# Patient Record
Sex: MALE | Race: WHITE | NOT HISPANIC OR LATINO | Employment: OTHER | URBAN - METROPOLITAN AREA
[De-identification: names, ages, dates, MRNs, and addresses within clinical notes are randomized per-mention and may not be internally consistent; named-entity substitution may affect disease eponyms.]

---

## 2017-01-23 ENCOUNTER — OFFICE VISIT (OUTPATIENT)
Dept: UROLOGY | Facility: CLINIC | Age: 72
End: 2017-01-23
Payer: MEDICARE

## 2017-01-23 ENCOUNTER — TELEPHONE (OUTPATIENT)
Dept: UROLOGY | Facility: CLINIC | Age: 72
End: 2017-01-23

## 2017-01-23 ENCOUNTER — LAB VISIT (OUTPATIENT)
Dept: LAB | Facility: HOSPITAL | Age: 72
End: 2017-01-23
Attending: UROLOGY
Payer: MEDICARE

## 2017-01-23 VITALS
HEIGHT: 70 IN | BODY MASS INDEX: 33.36 KG/M2 | HEART RATE: 82 BPM | WEIGHT: 233 LBS | SYSTOLIC BLOOD PRESSURE: 142 MMHG | DIASTOLIC BLOOD PRESSURE: 86 MMHG

## 2017-01-23 DIAGNOSIS — R31.9 HEMATURIA, UNDIAGNOSED CAUSE: ICD-10-CM

## 2017-01-23 LAB
CREAT SERPL-MCNC: 1.4 MG/DL
EST. GFR  (AFRICAN AMERICAN): 59 ML/MIN/1.73 M^2
EST. GFR  (NON AFRICAN AMERICAN): 51 ML/MIN/1.73 M^2

## 2017-01-23 PROCEDURE — 99214 OFFICE O/P EST MOD 30 MIN: CPT | Mod: S$PBB,,, | Performed by: UROLOGY

## 2017-01-23 PROCEDURE — 36415 COLL VENOUS BLD VENIPUNCTURE: CPT

## 2017-01-23 PROCEDURE — 99999 PR PBB SHADOW E&M-EST. PATIENT-LVL III: CPT | Mod: PBBFAC,,, | Performed by: UROLOGY

## 2017-01-23 PROCEDURE — 81002 URINALYSIS NONAUTO W/O SCOPE: CPT | Mod: PBBFAC,PO | Performed by: UROLOGY

## 2017-01-23 PROCEDURE — 88112 CYTOPATH CELL ENHANCE TECH: CPT | Mod: 26,,, | Performed by: PATHOLOGY

## 2017-01-23 PROCEDURE — 88112 CYTOPATH CELL ENHANCE TECH: CPT | Performed by: PATHOLOGY

## 2017-01-23 PROCEDURE — 82565 ASSAY OF CREATININE: CPT

## 2017-01-23 PROCEDURE — 99213 OFFICE O/P EST LOW 20 MIN: CPT | Mod: PBBFAC,PO | Performed by: UROLOGY

## 2017-01-23 RX ORDER — ZOLPIDEM TARTRATE 10 MG/1
10 TABLET ORAL NIGHTLY PRN
COMMUNITY
End: 2023-11-06 | Stop reason: CLARIF

## 2017-01-23 RX ORDER — LEVOTHYROXINE SODIUM 25 UG/1
25 TABLET ORAL
COMMUNITY
End: 2017-04-12 | Stop reason: DRUGHIGH

## 2017-01-23 RX ORDER — ASPIRIN 81 MG/1
81 TABLET ORAL DAILY
COMMUNITY
End: 2017-12-04

## 2017-01-23 NOTE — TELEPHONE ENCOUNTER
----- Message from Daphne Zamudio sent at 1/23/2017  8:24 AM CST -----  Contact: self [phone]   Patient is requesting a call back from the nurse stated he passing blood and requesting an appt with dr joya refuse to see an assistant.  Please call the patient upon request at phone number [phone].

## 2017-01-23 NOTE — TELEPHONE ENCOUNTER
Passing a lot of blood in his urine for the past 3 days.  Would like to see Dr. Arevalo only.   appt booked.

## 2017-01-23 NOTE — MR AVS SNAPSHOT
[facility]  [address]  [place] [address]  Phone: [phone]                  Mauri Archer Jr.   2017 2:45 PM   Office Visit    Description:  Male : 1945   Provider:  Dave Arevalo MD   Department:  [facility]           Reason for Visit     Gross hematuria     Dysuria           Diagnoses this Visit        Comments    Hematuria, undiagnosed cause                To Do List           Future Appointments        Provider Department Dept Phone    2017 4:30 PM [facility] CT1 LIMIT [facility] [phone]    2/10/2017 10:45 AM [facility] [phone]      Goals (5 Years of Data)     None      [facility]     [facility] -  Assistance  Registered nurses in the [facility] provide clinical advisement, health education, appointment booking, and other advisory services.  Call for this free service at [phone].             Medications           Message regarding Medications     Verify the changes and/or additions to your medication regime listed below are the same as discussed with your clinician today.  If any of these changes or additions are incorrect, please notify your healthcare provider.             Verify that the below list of medications is an accurate representation of the medications you are currently taking.  If none reported, the list may be blank. If incorrect, please contact your healthcare provider. Carry this list with you in case of emergency.           Current Medications     allopurinol (ZYLOPRIM) 300 MG tablet Take 300 mg by mouth once daily.    aspirin (ECOTRIN) 81 MG EC tablet Take 81 mg by mouth once daily.    calcium citrate-vitamin D3 315-200 mg (CITRACAL+D) 315-200 mg-unit per tablet Take 1 tablet by mouth once daily. 2 nightly    CALCIUM ORAL Take 1 tablet by mouth once daily.    ciclesonide (OMNARIS) 50 mcg Spry 2 sprays  "by Each Nare route daily as needed.    empagliflozin (JARDIANCE) 10 mg Tab Take 1 tablet by mouth once daily.    FREESTYLE LITE STRIPS Strp     glimepiride (AMARYL) 4 MG tablet Take 4 mg by mouth daily with breakfast.     insulin detemir (LEVEMIR) 100 unit/mL injection Inject 32 Units into the skin every evening.     levmefolate-B6 phos-methyl-B12 (METANX) 3-35-2 mg Tab Take 1 tablet by mouth once daily.    levothyroxine (SYNTHROID) 200 MCG tablet Take 200 mcg by mouth once daily.    levothyroxine (SYNTHROID) 25 MCG tablet Take 25 mcg by mouth 3 (three) times a week.    omega-3 acid ethyl esters (LOVAZA) 1 gram capsule Take 2 g by mouth 2 (two) times daily. 1 am and 2 at night    pravastatin (PRAVACHOL) 40 MG tablet Take 40 mg by mouth once daily.    ramipril (ALTACE) 5 MG capsule Take 5 mg by mouth once daily.    sildenafil (VIAGRA) 100 MG tablet Take 1 tablet (100 mg total) by mouth as needed for Erectile Dysfunction.    sitagliptan-metformin (JANUMET) 50-1,000 mg per tablet Take 1 tablet by mouth 2 (two) times daily with meals.    tamsulosin (FLOMAX) 0.4 mg Cp24 Take 1 capsule (0.4 mg total) by mouth once daily.    zolpidem (AMBIEN) 10 mg Tab Take 5 mg by mouth nightly as needed.           Clinical Reference Information           Vital Signs - Last Recorded  Most recent update: 1/23/2017  3:28 PM by Halle Mustafa MA    BP Pulse Ht Wt BMI    (!) 142/86 (BP Location: Right arm, Patient Position: Sitting, BP Method: Automatic) 82 5' 10" (1.778 m) 105.7 kg (233 lb) 33.43 kg/m2      Blood Pressure          Most Recent Value    BP  (!)  142/86      Allergies as of 1/23/2017     No Known Allergies      Immunizations Administered on Date of Encounter - 1/23/2017     None      Orders Placed During Today's Visit      Normal Orders This Visit    Cytology, urine     POCT URINE DIPSTICK WITHOUT MICROSCOPE     Future Labs/Procedures Expected by Expires    Creatinine, serum  1/23/2017 1/24/2018    CT Abdomen Pelvis W Wo " Contrast  1/23/2017 1/24/2018    Cytology, urine  1/23/2017 1/24/2018

## 2017-01-23 NOTE — PROGRESS NOTES
Subjective:       Patient ID: Mauri Archer Jr. is a 71 y.o. male.    Chief Complaint:   OFFICE NOTE    CHIEF COMPLAINT:  Recurrent gross hematuria.    Mr. Archer is a 71-year-old male who referred that for the last couple of days,   he has been experiencing gross hematuria with clots.  He refers that a year ago,   have another episode of hematuria, but resolved within 24 hours.  The patient   denies dysuria.  The patient denies any decrease in the urine flow, but he   refers that the end, have significant terminal dribbling.  Nocturia x1.  Denies   any pain, flank pain, fever.  Mr. Archer was seen by me last time on June 2016.    At that time, he was having difficulty with erectile dysfunction and BPH   symptoms.    The last PSA was on June 2016, 2.8.  The urinalysis today shows negative   leukocytes or nitrites, 4+ glucose.  The patient is diabetic, known, under   treatment and 2+ blood.    The remaining of the medical and surgical history and the numerous current   medications are well documented in the medical record and this was reviewed   during this visit.  The only medication that he takes as a blood thinner is   aspirin.      EOR/PN  dd: 01/23/2017 17:58:33 (CST)  td: 01/23/2017 20:37:45 (CST)  Doc ID   #[id number]  Job ID #[id number]    CC:       HPI  Review of Systems   Constitutional: Negative for activity change and appetite change.   HENT: Negative.    Eyes: Negative for discharge.   Respiratory: Negative for cough and shortness of breath.    Cardiovascular: Negative for chest pain and palpitations.   Gastrointestinal: Negative for abdominal distention, abdominal pain, constipation and vomiting.   Genitourinary: Positive for hematuria. Negative for discharge, dysuria, flank pain, frequency, testicular pain and urgency.   Musculoskeletal: Negative for arthralgias.   Skin: Negative for rash.   Neurological: Negative for dizziness.   Psychiatric/Behavioral: The patient is not nervous/anxious.         Objective:      Physical Exam   Constitutional: He appears well-developed and well-nourished.   HENT:   Head: Normocephalic.   Eyes: Pupils are equal, round, and reactive to light.   Neck: Normal range of motion.   Cardiovascular: Normal rate.    Pulmonary/Chest: Effort normal.   Abdominal: Soft. He exhibits no distension and no mass. There is no tenderness. Hernia confirmed negative in the right inguinal area and confirmed negative in the left inguinal area.   Genitourinary: Rectum normal and penis normal. Rectal exam shows no external hemorrhoid, no mass and no tenderness. Prostate is enlarged. Prostate is not tender. Right testis shows no mass and no tenderness. Left testis shows no mass and no tenderness. No discharge found.   Musculoskeletal: Normal range of motion.   Neurological: He is alert.   Skin: Skin is warm.     Psychiatric: He has a normal mood and affect.       Assessment:       1. Hematuria, undiagnosed cause        Plan:       Hematuria, undiagnosed cause  -     POCT URINE DIPSTICK WITHOUT MICROSCOPE  -     Creatinine, serum; Future; Expected date: 1/23/17  -     CT Abdomen Pelvis W Wo Contrast; Future; Expected date: 1/23/17  -     Cytology, urine; Future; Expected date: 1/23/17    May need cystoscopy.  Plan on UFS/US due to his large prostate at rectal examination.

## 2017-01-24 LAB
BILIRUB SERPL-MCNC: ABNORMAL MG/DL
BLOOD URINE, POC: ABNORMAL
COLOR, POC UA: ABNORMAL
GLUCOSE UR QL STRIP: ABNORMAL
KETONES UR QL STRIP: ABNORMAL
LEUKOCYTE ESTERASE URINE, POC: ABNORMAL
NITRITE, POC UA: ABNORMAL
PH, POC UA: 5
PROTEIN, POC: ABNORMAL
SPECIFIC GRAVITY, POC UA: 1.02
UROBILINOGEN, POC UA: ABNORMAL

## 2017-01-25 ENCOUNTER — HOSPITAL ENCOUNTER (OUTPATIENT)
Dept: RADIOLOGY | Facility: HOSPITAL | Age: 72
Discharge: HOME | End: 2017-01-25
Attending: UROLOGY
Payer: MEDICARE

## 2017-01-25 DIAGNOSIS — R31.9 HEMATURIA, UNDIAGNOSED CAUSE: ICD-10-CM

## 2017-01-25 PROCEDURE — 25500020 PHARM REV CODE 255

## 2017-01-25 PROCEDURE — 74178 CT ABD&PLV WO CNTR FLWD CNTR: CPT | Mod: TC

## 2017-01-25 PROCEDURE — 74178 CT ABD&PLV WO CNTR FLWD CNTR: CPT | Mod: 26,,, | Performed by: RADIOLOGY

## 2017-01-25 RX ADMIN — IOHEXOL 100 ML: 350 INJECTION, SOLUTION INTRAVENOUS at 02:01

## 2017-01-27 ENCOUNTER — PROCEDURE VISIT (OUTPATIENT)
Dept: UROLOGY | Facility: CLINIC | Age: 72
End: 2017-01-27
Payer: MEDICARE

## 2017-01-27 VITALS
HEART RATE: 65 BPM | WEIGHT: 235 LBS | HEIGHT: 70 IN | SYSTOLIC BLOOD PRESSURE: 144 MMHG | DIASTOLIC BLOOD PRESSURE: 84 MMHG | BODY MASS INDEX: 33.64 KG/M2

## 2017-01-27 DIAGNOSIS — N40.1 BPH WITH OBSTRUCTION/LOWER URINARY TRACT SYMPTOMS: Primary | ICD-10-CM

## 2017-01-27 DIAGNOSIS — N13.8 BPH WITH OBSTRUCTION/LOWER URINARY TRACT SYMPTOMS: Primary | ICD-10-CM

## 2017-01-27 PROCEDURE — 51741 ELECTRO-UROFLOWMETRY FIRST: CPT | Mod: 26,S$PBB,, | Performed by: UROLOGY

## 2017-01-27 PROCEDURE — 51741 ELECTRO-UROFLOWMETRY FIRST: CPT | Mod: PBBFAC,PO | Performed by: UROLOGY

## 2017-01-27 PROCEDURE — 76872 US TRANSRECTAL: CPT | Mod: PBBFAC,PO | Performed by: UROLOGY

## 2017-01-27 NOTE — PROCEDURES
"TRUS  Date/Time: 2017 12:13 PM  Performed by: ARELY ROBIN  Authorized by: ARELY ROBIN     Consent Done?:  Yes (Verbal)  Time out: Immediately prior to procedure a "time out" was called to verify the correct patient, procedure, equipment, support staff and site/side marked as required.    Indications comment:  BPH with LUTS and hematuria  Position:  Left lateral  Patient sedated: No    Prostate Size:  H: 50.9  W:58.7  L: 62.7  Vol: 98.1  PSA 2.8 PSAD: 0.03  Lesions:: No      Patient tolerance:  Patient tolerated the procedure well with no immediate complications      URINE FLOW STUDY REPORT    NAME:Mauri Blair Gianni Morales  :1945   DIAGNOSIS:BPH with hematuria  Current PSA:2.8         URINE FLOW STUDY:  Volume:  379 ml             Average Flow:  8  ml/sec  Peak Flow: 15 ml/sec      Curve Shape: Obstructed  IMPRESSION: Restricted urine flow study                      Large BPH. Atypical urine cytology.     Plan: Cystoscopi 17 may need a PVP.      "

## 2017-01-27 NOTE — MR AVS SNAPSHOT
[facility]   [address]  [place] [address]  Phone: [phone]                  Mauri Archer Jr.   2017 11:00 AM   Procedure visit    Description:  Male : 1945   Provider:  Dave Arevalo MD   Department:  [facility]           Reason for Visit     Trus with Ufs                To Do List           Goals (5 Years of Data)     None      [facility]     [facility] On Call Nurse Care Line -  Assistance  Registered nurses in the [facility] On Call Center provide clinical advisement, health education, appointment booking, and other advisory services.  Call for this free service at [phone].             Medications           Message regarding Medications     Verify the changes and/or additions to your medication regime listed below are the same as discussed with your clinician today.  If any of these changes or additions are incorrect, please notify your healthcare provider.             Verify that the below list of medications is an accurate representation of the medications you are currently taking.  If none reported, the list may be blank. If incorrect, please contact your healthcare provider. Carry this list with you in case of emergency.           Current Medications     allopurinol (ZYLOPRIM) 300 MG tablet Take 300 mg by mouth once daily.    aspirin (ECOTRIN) 81 MG EC tablet Take 81 mg by mouth once daily.    calcium citrate-vitamin D3 315-200 mg (CITRACAL+D) 315-200 mg-unit per tablet Take 1 tablet by mouth once daily. 2 nightly    CALCIUM ORAL Take 1 tablet by mouth once daily.    ciclesonide (OMNARIS) 50 mcg Spry 2 sprays by Each Nare route daily as needed.    empagliflozin (JARDIANCE) 10 mg Tab Take 1 tablet by mouth once daily.    FREESTYLE LITE STRIPS Strp     glimepiride (AMARYL) 4 MG tablet Take 4 mg by mouth daily with breakfast.     insulin detemir (LEVEMIR) 100 unit/mL injection Inject 32 Units into the skin every evening.      "levmefolate-B6 phos-methyl-B12 (METANX) 3-35-2 mg Tab Take 1 tablet by mouth once daily.    levothyroxine (SYNTHROID) 200 MCG tablet Take 200 mcg by mouth once daily.    levothyroxine (SYNTHROID) 25 MCG tablet Take 25 mcg by mouth 3 (three) times a week.    omega-3 acid ethyl esters (LOVAZA) 1 gram capsule Take 2 g by mouth 2 (two) times daily. 1 am and 2 at night    pravastatin (PRAVACHOL) 40 MG tablet Take 40 mg by mouth once daily.    ramipril (ALTACE) 5 MG capsule Take 5 mg by mouth once daily.    sildenafil (VIAGRA) 100 MG tablet Take 1 tablet (100 mg total) by mouth as needed for Erectile Dysfunction.    tamsulosin (FLOMAX) 0.4 mg Cp24 Take 1 capsule (0.4 mg total) by mouth once daily.    zolpidem (AMBIEN) 10 mg Tab Take 5 mg by mouth nightly as needed.    sitagliptan-metformin (JANUMET) 50-1,000 mg per tablet Take 1 tablet by mouth 2 (two) times daily with meals.           Clinical Reference Information           Vital Signs - Last Recorded  Most recent update: 1/27/2017 11:25 AM by Halle Mustafa MA    BP Pulse Ht Wt BMI    (!) 144/84 (BP Location: Left arm, Patient Position: Sitting, BP Method: Automatic) 65 5' 10" (1.778 m) 106.6 kg (235 lb) 33.72 kg/m2      Blood Pressure          Most Recent Value    BP  (!)  144/84      Allergies as of 1/27/2017     No Known Allergies      Immunizations Administered on Date of Encounter - 1/27/2017     None      "

## 2017-01-31 ENCOUNTER — TELEPHONE (OUTPATIENT)
Dept: UROLOGY | Facility: CLINIC | Age: 72
End: 2017-01-31

## 2017-01-31 NOTE — TELEPHONE ENCOUNTER
----- Message from Kimmy Mookie sent at 1/31/2017  2:49 PM CST -----  Resend the fax to Dr. Parsons's office, fax was not readable. Please fax to [phone], any questions call Sirena at [phone].

## 2017-02-08 ENCOUNTER — HOSPITAL ENCOUNTER (OUTPATIENT)
Facility: AMBULARY SURGERY CENTER | Age: 72
Discharge: HOME | End: 2017-02-08
Attending: UROLOGY | Admitting: UROLOGY
Payer: MEDICARE

## 2017-02-08 ENCOUNTER — TELEPHONE (OUTPATIENT)
Dept: UROLOGY | Facility: CLINIC | Age: 72
End: 2017-02-08

## 2017-02-08 DIAGNOSIS — N40.1 BPH WITH OBSTRUCTION/LOWER URINARY TRACT SYMPTOMS: ICD-10-CM

## 2017-02-08 DIAGNOSIS — N13.8 BPH WITH OBSTRUCTION/LOWER URINARY TRACT SYMPTOMS: ICD-10-CM

## 2017-02-08 LAB
BILIRUB SERPL-MCNC: NORMAL MG/DL
BLOOD URINE, POC: NORMAL
COLOR, POC UA: NORMAL
GLUCOSE UR QL STRIP: 1000
KETONES UR QL STRIP: NORMAL
LEUKOCYTE ESTERASE URINE, POC: NORMAL
NITRITE, POC UA: NORMAL
PH, POC UA: 5
PROTEIN, POC: NORMAL
SPECIFIC GRAVITY, POC UA: 1.02
UROBILINOGEN, POC UA: NORMAL

## 2017-02-08 PROCEDURE — 52000 CYSTOURETHROSCOPY: CPT | Performed by: UROLOGY

## 2017-02-08 PROCEDURE — 52000 CYSTOURETHROSCOPY: CPT | Mod: ,,, | Performed by: UROLOGY

## 2017-02-08 PROCEDURE — G8918 PT W/O PREOP ORDER IV AB PRO: HCPCS | Performed by: UROLOGY

## 2017-02-08 PROCEDURE — G8907 PT DOC NO EVENTS ON DISCHARG: HCPCS | Performed by: UROLOGY

## 2017-02-08 RX ORDER — WATER 1000 ML/1000ML
INJECTION, SOLUTION INTRAVENOUS
Status: DISCONTINUED | OUTPATIENT
Start: 2017-02-08 | End: 2017-02-08 | Stop reason: HOSPADM

## 2017-02-08 RX ORDER — LIDOCAINE HYDROCHLORIDE 20 MG/ML
JELLY TOPICAL
Status: DISCONTINUED | OUTPATIENT
Start: 2017-02-08 | End: 2017-02-08 | Stop reason: HOSPADM

## 2017-02-08 RX ORDER — LIDOCAINE HYDROCHLORIDE 20 MG/ML
JELLY TOPICAL
Status: DISCONTINUED
Start: 2017-02-08 | End: 2017-02-08 | Stop reason: HOSPADM

## 2017-02-08 NOTE — H&P
CHIEF COMPLAINT: Recurrent gross hematuria.     Mr. Archer is a 71-year-old male who referred that for the last couple of days,   he has been experiencing gross hematuria with clots. He refers that a year ago,  have another episode of hematuria, but resolved within 24 hours. The patient   denies dysuria. The patient denies any decrease in the urine flow, but he   refers that the end, have significant terminal dribbling. Nocturia x1. Denies   any pain, flank pain, fever. Mr. Archer was seen by me last time on June 2016.   At that time, he was having difficulty with erectile dysfunction and BPH   symptoms.     The last PSA was on June 2016, 2.8. The urinalysis today shows negative   leukocytes or nitrites, 4+ glucose. The patient is diabetic, known, under   treatment and 2+ blood.     The remaining of the medical and surgical history and the numerous current   medications are well documented in the medical record and this was reviewed   during this visit. The only medication that he takes as a blood thinner is   aspirin.          Review of Systems   Constitutional: Negative for activity change and appetite change.   HENT: Negative.   Eyes: Negative for discharge.   Respiratory: Negative for cough and shortness of breath.   Cardiovascular: Negative for chest pain and palpitations.   Gastrointestinal: Negative for abdominal distention, abdominal pain, constipation and vomiting.   Genitourinary: Positive for hematuria. Negative for discharge, dysuria, flank pain, frequency, testicular pain and urgency.   Musculoskeletal: Negative for arthralgias.   Skin: Negative for rash.   Neurological: Negative for dizziness.   Psychiatric/Behavioral: The patient is not nervous/anxious.       Objective:      Physical Exam   Constitutional: He appears well-developed and well-nourished.   HENT:   Head: Normocephalic.   Eyes: Pupils are equal, round, and reactive to light.   Neck: Normal range of motion.   Cardiovascular: Normal rate.  "  Pulmonary/Chest: Effort normal.   Abdominal: Soft. He exhibits no distension and no mass. There is no tenderness. Hernia confirmed negative in the right inguinal area and confirmed negative in the left inguinal area.   Genitourinary: Rectum normal and penis normal. Rectal exam shows no external hemorrhoid, no mass and no tenderness. Prostate is enlarged. Prostate is not tender. Right testis shows no mass and no tenderness. Left testis shows no mass and no tenderness. No discharge found.   Musculoskeletal: Normal range of motion.   Neurological: He is alert.   Skin: Skin is warm.     Psychiatric: He has a normal mood and affect.         TRUS  Date/Time: 2017 12:13 PM  Performed by: ARELY ROBIN.  Authorized by: ARELY ROBIN.   Consent Done?: Yes (Verbal)  Time out: Immediately prior to procedure a "time out" was called to verify the correct patient, procedure, equipment, support staff and site/side marked as required.   Indications comment: BPH with LUTS and hematuria  Position: Left lateral  Patient sedated: No   Prostate Size: H: 50.9 W:58.7 L: 62.7 Vol: 98.1 PSA 2.8 PSAD: 0.03  Lesions:: No      Patient tolerance: Patient tolerated the procedure well with no immediate complications     URINE FLOW STUDY REPORT     NAME:Mauri Blair Gianni Campbell.  :1945   DIAGNOSIS:BPH with hematuria  Current PSA:2.8            URINE FLOW STUDY:  Volume: 379 ml Average Flow: 8 ml/sec  Peak Flow: 15 ml/sec Curve Shape: Obstructed  IMPRESSION: Restricted urine flow study      Large BPH. Atypical urine cytology.      Plan: Cystoscopi 17 may need a PVP.            Assessment:       1. Hematuria, undiagnosed cause      Large BPH         "

## 2017-02-08 NOTE — OP NOTE
CYSTOSCOPY REPORT    Surgery Date:  2017  Surgeon(s) and Role:     * Dave Arevalo MD - Primary      Mauri Archer   : 1945    Pre Procedure Diagnosis:Bph with hematuria    OPERATION: CYSTOSCOPY     ANESTHESIA: 10 cc 2% lidocaine jelly applied per urethra.  14 FR Flexible Olympus cystoscope used.    PROCEDURE:  The patient was taken to the cystoscopy suite and placed in supine position.  The genitalia was prepped and draped  in the usual sterile fashion.  Two percent lidocaine jelly was inserted in the urethra and held in place with a penile clamp.  After sufficent time had passed to allow good local anesthesia, the cystoscope was inserted in the urethra and passed into the bladder visualizing the urethra along its entire course.  The dome, anterior, posterior and lateral walls were examined systematically.  The ureteral orifices were in their usual position and configuration.  The cystoscope was turned upon itself 180 degrees to visualize the bladder neck.The cystoscope was then brought to the level of the bladder neck, the water was turned on and the prostate was visualized.  The cystoscope was removed and the patient was instructed to urinate prior to leaving the office.  SPECIMEN:none    FINDINGS:  URETHRA: open with no strictures  PROSTATE: 6 cm trilobar BPH with FONG   TRABEC: grade 3   BLADDER: no lesions or stones  URETERAL ORIFICES : not seen due to enlarged prostate lobe   OTHER FINDINGS: none    Procedure medication: Lidocaine gel in the urethra  Post Procedure Diagnosis: BPH with FONG     ASSESSMENT/PLAN:  Status post flexible cystoscopy.  1. Push fluids for 24 hours.  2. May see blood in the urine, this should gradually improve over the next 2-3 days.  3. The patient was instructed to return to the office or go to the emergency should fever, chills, cloudy urine, or inability to urinate develop.  4.  PLAN: PVP on 3/21/17 under anesthesia  5. Follow up in 3/15/17 pre op

## 2017-02-08 NOTE — IP AVS SNAPSHOT
[facility] [place] Location  [address] [address]           Patient Discharge Instructions     Our goal is to set you up for success. This packet includes information on your condition, medications, and your home care. It will help you to care for yourself so you don't get sicker and need to go back to the hospital.     Please ask your nurse if you have any questions.        There are many details to remember when preparing to leave the hospital. Here is what you will need to do:    1. Take your medicine. If you are prescribed medications, review your Medication List in the following pages. You may have new medications to  at the pharmacy and others that you'll need to stop taking. Review the instructions for how and when to take your medications. Talk with your doctor or nurses if you are unsure of what to do.     2. Go to your follow-up appointments. Specific follow-up information is listed in the following pages. Your may be contacted by a transition nurse or clinical provider about future appointments. Be sure we have all of the phone numbers to reach you, if needed. Please contact your provider's office if you are unable to make an appointment.     3. Watch for warning signs. Your doctor or nurse will give you detailed warning signs to watch for and when to call for assistance. These instructions may also include educational information about your condition. If you experience any of warning signs to your health, call your doctor.               [facility]  Unless otherwise directed by your provider, please contact [facility], our nurse care line that is available for 24/7 assistance.     [phone] (toll-free)    Registered nurses in the [facility] provide clinical advisement, health education, appointment booking, and other advisory services.                    ** Verify the list of medication(s) below is accurate and up  to date. Carry this with you in case of emergency. If your medications have changed, please notify your healthcare provider.             Medication List      CHANGE how you take these medications        Additional Info                      tamsulosin 0.4 mg Cp24   Commonly known as:  FLOMAX   Quantity:  90 capsule   Refills:  3   Dose:  0.4 mg   What changed:  when to take this    Instructions:  Take 1 capsule (0.4 mg total) by mouth once daily.     Begin Date    AM    Noon    PM    Bedtime         CONTINUE taking these medications        Additional Info                      allopurinol 300 MG tablet   Commonly known as:  ZYLOPRIM   Refills:  0   Dose:  300 mg    Instructions:  Take 300 mg by mouth once daily.     Begin Date    AM    Noon    PM    Bedtime       aspirin 81 MG EC tablet   Commonly known as:  ECOTRIN   Refills:  0   Dose:  81 mg    Instructions:  Take 81 mg by mouth once daily.     Begin Date    AM    Noon    PM    Bedtime       calcium citrate-vitamin D3 315-200 mg 315-200 mg-unit per tablet   Commonly known as:  CITRACAL+D   Refills:  0   Dose:  1 tablet    Instructions:  Take 1 tablet by mouth once daily. 2 nightly     Begin Date    AM    Noon    PM    Bedtime       CALCIUM ORAL   Refills:  0   Dose:  1 tablet    Instructions:  Take 1 tablet by mouth once daily.     Begin Date    AM    Noon    PM    Bedtime       CHLOR-MES D ORAL   Refills:  0   Dose:  2 tablet    Instructions:  Take 2 tablets by mouth daily as needed.     Begin Date    AM    Noon    PM    Bedtime       FREESTYLE LITE STRIPS Strp   Refills:  6   Generic drug:  blood sugar diagnostic      Begin Date    AM    Noon    PM    Bedtime       glimepiride 4 MG tablet   Commonly known as:  AMARYL   Refills:  0   Dose:  4 mg    Instructions:  Take 4 mg by mouth daily with breakfast.     Begin Date    AM    Noon    PM    Bedtime       insulin detemir 100 unit/mL injection   Commonly known as:  LEVEMIR   Refills:  0   Dose:  32 Units     Instructions:  Inject 32 Units into the skin every evening.     Begin Date    AM    Noon    PM    Bedtime       JARDIANCE 10 mg Tab   Refills:  0   Dose:  1 tablet   Generic drug:  empagliflozin    Instructions:  Take 1 tablet by mouth once daily.     Begin Date    AM    Noon    PM    Bedtime       * levothyroxine 200 MCG tablet   Commonly known as:  SYNTHROID   Refills:  0   Dose:  200 mcg    Instructions:  Take 200 mcg by mouth once daily.     Begin Date    AM    Noon    PM    Bedtime       * levothyroxine 25 MCG tablet   Commonly known as:  SYNTHROID   Refills:  0   Dose:  25 mcg    Instructions:  Take 25 mcg by mouth 3 (three) times a week.     Begin Date    AM    Noon    PM    Bedtime       METANX 3-35-2 mg Tab   Refills:  0   Dose:  1 tablet   Generic drug:  mecobal-levomefolat Ca-B6 phos    Instructions:  Take 1 tablet by mouth once daily.     Begin Date    AM    Noon    PM    Bedtime       omega-3 acid ethyl esters 1 gram capsule   Commonly known as:  LOVAZA   Refills:  0   Dose:  2 g    Instructions:  Take 2 g by mouth 2 (two) times daily. 1 am and 2 at night     Begin Date    AM    Noon    PM    Bedtime       OMNARIS 50 mcg Spry   Refills:  0   Dose:  2 spray   Generic drug:  ciclesonide    Instructions:  2 sprays by Each Nare route daily as needed.     Begin Date    AM    Noon    PM    Bedtime       pravastatin 40 MG tablet   Commonly known as:  PRAVACHOL   Refills:  0   Dose:  40 mg    Instructions:  Take 40 mg by mouth once daily.     Begin Date    AM    Noon    PM    Bedtime       ramipril 5 MG capsule   Commonly known as:  ALTACE   Refills:  0   Dose:  5 mg    Instructions:  Take 5 mg by mouth once daily.     Begin Date    AM    Noon    PM    Bedtime       sildenafil 100 MG tablet   Commonly known as:  VIAGRA   Quantity:  6 tablet   Refills:  11   Dose:  100 mg    Instructions:  Take 1 tablet (100 mg total) by mouth as needed for Erectile Dysfunction.     Begin Date    AM    Noon    PM    Bedtime        SITagliptan-metformin 50-1,000 mg per tablet   Commonly known as:  JANUMET   Refills:  0   Dose:  1 tablet    Instructions:  Take 1 tablet by mouth 2 (two) times daily with meals.     Begin Date    AM    Noon    PM    Bedtime       zolpidem 10 mg Tab   Commonly known as:  AMBIEN   Refills:  0   Dose:  5 mg    Instructions:  Take 5 mg by mouth nightly as needed.     Begin Date    AM    Noon    PM    Bedtime       * Notice:  This list has 2 medication(s) that are the same as other medications prescribed for you. Read the directions carefully, and ask your doctor or other care provider to review them with you.               Please bring to all follow up appointments:    1. A copy of your discharge instructions.  2. All medicines you are currently taking in their original bottles.  3. Identification and insurance card.    Please arrive 15 minutes ahead of scheduled appointment time.    Please call 24 hours in advance if you must reschedule your appointment and/or time.        Your Scheduled Appointments     Mar 15, 2017  9:15 AM CDT   [facility]    [address] , [address]  [facility] [address]   [phone]              Follow-up Information     Follow up with Dave Arevalo MD On 2/15/2017.    Specialty:  Urology    Contact information:    [address]  [facility] [address]  [facility] [address]  [phone]          Discharge Instructions     Future Orders    Activity as tolerated     Diet general     Questions:    Total calories:      Fat restriction, if any:      Protein restriction, if any:      Na restriction, if any:      Fluid restriction:      Additional restrictions:          Discharge Instructions                After the procedure    · Drink plenty of fluids.  · You may have burning or light bleeding when you urinate--this is normal.  · Medications may be prescribed to ease any discomfort or prevent infection. Take these as directed.  · Call your  "doctor if you have heavy bleeding or blood clots, burning that lasts more than a day, a fever over 100°F  (38° C), or trouble urinating.              Primary Diagnosis     Your primary diagnosis was:  Enlarged Prostate With Urinary Obstruction      Admission Information     Date & Time Provider Department CSN    2/8/2017  1:08 PM Dave Arevalo MD [facility] [id number]      Care Providers     Provider Role Specialty Primary office phone    Dave Arevalo MD Attending Provider Urology [phone]    Dave Arevalo MD Surgeon  Urology [phone]      Your Vitals Were     BP Pulse Temp Resp Height Weight    158/95 85 97.9 °F (36.6 °C) (Oral) 20 5' 10" (1.778 m) 106.6 kg (235 lb)    SpO2 BMI             94% 33.72 kg/m2         Recent Lab Values     No lab values to display.      Allergies as of 2/8/2017     No Known Allergies      Smoking Cessation     If you would like to quit smoking:   You may be eligible for free services if you are a [place] resident and started smoking cigarettes before September 1, 1988.  Call the Smoking [employer]essation [employer] ([facility]) toll free at (035) [phone] or (465) 690-5403.   Call 1-800-QUIT-NOW if you do not meet the above criteria.            Language Assistance Services     ATTENTION: Language assistance services are available, free of charge. Please call [phone].      ATENCIÓN: Si habla español, tiene a gilmore disposición servicios gratuitos de asistencia lingüística. Llame al [phone].     CHÚ Ý: N?u b?n nói Ti?ng Vi?t, có các d?ch v? h? tr? ngôn ng? mi?n phí dành cho b?n. G?i s? [phone].         [facility] complies with applicable Federal civil rights laws and does not discriminate on the basis of race, color, national origin, age, disability, or sex.        "

## 2017-02-08 NOTE — BRIEF OP NOTE
Brief Operative Note     Surgery Date: 2/8/2017    Surgeon:   Dave Arevalo MD     Pre-op Diagnosis:  BPH with obstruction/lower urinary tract symptoms [N40.1, N13.8]  BPH with obstruction/lower urinary tract symptoms [N40.1, N13.8]    Post-op Diagnosis:  Same    Procedure:  Procedure(s) (LRB):  CYSTOSCOPY (N/A)    Anesthesia: Local MAC    Findings/Key Components:  See Op Report    Estimated Blood Loss: Minimal                                                                                                                           Discharge Summary    Admit Date: 2/8/2017     Attending Physician: Dave Arevalo MD     Discharge Physician: Dave Arevalo MD      Discharge Date: 2/8/2017    Treatments/Procedures: Procedure(s) (LRB):  CYSTOSCOPY (N/A)  Final Diagnosis:BPH with FONG  Hospital Course: Cystoscopy done as planned  F. U. Dr. Arevalo 3.15.17 to arrange PVP on 3.21.17    Disposition: Home or Self Care     Patient Instructions:     Current Discharge Medication List:         Mauri Archer Jr.   Home Medication Instructions JUNG:[id number]    Printed on:02/08/17 1413   Medication Information                      allopurinol (ZYLOPRIM) 300 MG tablet  Take 300 mg by mouth once daily.             aspirin (ECOTRIN) 81 MG EC tablet  Take 81 mg by mouth once daily.             calcium citrate-vitamin D3 315-200 mg (CITRACAL+D) 315-200 mg-unit per tablet  Take 1 tablet by mouth once daily. 2 nightly             CALCIUM ORAL  Take 1 tablet by mouth once daily.             CHLOR-MAL/PHENYLEPH/METHSCOP (CHLOR-MES D ORAL)  Take 2 tablets by mouth daily as needed.             ciclesonide (OMNARIS) 50 mcg Spry  2 sprays by Each Nare route daily as needed.             empagliflozin (JARDIANCE) 10 mg Tab  Take 1 tablet by mouth once daily.             FREESTYLE LITE STRIPS Strp               glimepiride (AMARYL) 4 MG tablet  Take 4 mg by mouth daily with breakfast.              insulin detemir  (LEVEMIR) 100 unit/mL injection  Inject 32 Units into the skin every evening.              levmefolate-B6 phos-methyl-B12 (METANX) 3-35-2 mg Tab  Take 1 tablet by mouth once daily.             levothyroxine (SYNTHROID) 200 MCG tablet  Take 200 mcg by mouth once daily.             levothyroxine (SYNTHROID) 25 MCG tablet  Take 25 mcg by mouth 3 (three) times a week.             omega-3 acid ethyl esters (LOVAZA) 1 gram capsule  Take 2 g by mouth 2 (two) times daily. 1 am and 2 at night             pravastatin (PRAVACHOL) 40 MG tablet  Take 40 mg by mouth once daily.             ramipril (ALTACE) 5 MG capsule  Take 5 mg by mouth once daily.             sildenafil (VIAGRA) 100 MG tablet  Take 1 tablet (100 mg total) by mouth as needed for Erectile Dysfunction.             sitagliptan-metformin (JANUMET) 50-1,000 mg per tablet  Take 1 tablet by mouth 2 (two) times daily with meals.             tamsulosin (FLOMAX) 0.4 mg Cp24  Take 1 capsule (0.4 mg total) by mouth once daily.             zolpidem (AMBIEN) 10 mg Tab  Take 5 mg by mouth nightly as needed.                     Current Discharge Medication List      CONTINUE these medications which have NOT CHANGED    Details   aspirin (ECOTRIN) 81 MG EC tablet Take 81 mg by mouth once daily.      CHLOR-MAL/PHENYLEPH/METHSCOP (CHLOR-MES D ORAL) Take 2 tablets by mouth daily as needed.      glimepiride (AMARYL) 4 MG tablet Take 4 mg by mouth daily with breakfast.       levmefolate-B6 phos-methyl-B12 (METANX) 3-35-2 mg Tab Take 1 tablet by mouth once daily.      !! levothyroxine (SYNTHROID) 200 MCG tablet Take 200 mcg by mouth once daily.      omega-3 acid ethyl esters (LOVAZA) 1 gram capsule Take 2 g by mouth 2 (two) times daily. 1 am and 2 at night      sitagliptan-metformin (JANUMET) 50-1,000 mg per tablet Take 1 tablet by mouth 2 (two) times daily with meals.      tamsulosin (FLOMAX) 0.4 mg Cp24 Take 1 capsule (0.4 mg total) by mouth once daily.  Qty: 90 capsule, Refills: 3     Associated Diagnoses: Urinary frequency      allopurinol (ZYLOPRIM) 300 MG tablet Take 300 mg by mouth once daily.      calcium citrate-vitamin D3 315-200 mg (CITRACAL+D) 315-200 mg-unit per tablet Take 1 tablet by mouth once daily. 2 nightly      CALCIUM ORAL Take 1 tablet by mouth once daily.      ciclesonide (OMNARIS) 50 mcg Spry 2 sprays by Each Nare route daily as needed.      empagliflozin (JARDIANCE) 10 mg Tab Take 1 tablet by mouth once daily.      FREESTYLE LITE STRIPS Strp Refills: 6      insulin detemir (LEVEMIR) 100 unit/mL injection Inject 32 Units into the skin every evening.       !! levothyroxine (SYNTHROID) 25 MCG tablet Take 25 mcg by mouth 3 (three) times a week.      pravastatin (PRAVACHOL) 40 MG tablet Take 40 mg by mouth once daily.      ramipril (ALTACE) 5 MG capsule Take 5 mg by mouth once daily.      sildenafil (VIAGRA) 100 MG tablet Take 1 tablet (100 mg total) by mouth as needed for Erectile Dysfunction.  Qty: 6 tablet, Refills: 11      zolpidem (AMBIEN) 10 mg Tab Take 5 mg by mouth nightly as needed.       !! - Potential duplicate medications found. Please discuss with provider.          Discharge Procedure Orders:      Discharge Procedure Orders  Diet general     Activity as tolerated

## 2017-02-08 NOTE — TELEPHONE ENCOUNTER
----- Message from Marie Acosta sent at 2/8/2017  8:24 AM CST -----  Contact: self: [phone]  Patient has a procedure today but does not remember what time he has to be there. Please call to advise.

## 2017-02-08 NOTE — PLAN OF CARE
Stable, States ready to go home, minimal pain, refuses po fluids, amb to car with wife and daughter

## 2017-02-08 NOTE — DISCHARGE INSTRUCTIONS
After the procedure    · Drink plenty of fluids.  · You may have burning or light bleeding when you urinate--this is normal.  · Medications may be prescribed to ease any discomfort or prevent infection. Take these as directed.  · Call your doctor if you have heavy bleeding or blood clots, burning that lasts more than a day, a fever over 100°F  (38° C), or trouble urinating.

## 2017-02-09 DIAGNOSIS — N40.1 BPH WITH OBSTRUCTION/LOWER URINARY TRACT SYMPTOMS: Primary | ICD-10-CM

## 2017-02-09 DIAGNOSIS — N13.8 BPH WITH OBSTRUCTION/LOWER URINARY TRACT SYMPTOMS: Primary | ICD-10-CM

## 2017-02-10 VITALS
OXYGEN SATURATION: 94 % | HEART RATE: 85 BPM | WEIGHT: 235 LBS | RESPIRATION RATE: 20 BRPM | HEIGHT: 70 IN | BODY MASS INDEX: 33.64 KG/M2 | TEMPERATURE: 98 F | SYSTOLIC BLOOD PRESSURE: 158 MMHG | DIASTOLIC BLOOD PRESSURE: 95 MMHG

## 2017-02-21 ENCOUNTER — TELEPHONE (OUTPATIENT)
Dept: UROLOGY | Facility: CLINIC | Age: 72
End: 2017-02-21

## 2017-02-21 NOTE — TELEPHONE ENCOUNTER
Called pt's wife back and rescheduled preop appt with Dr. Arevalo for 4/12/17 @ 9 am and surgery on 4/18/17.

## 2017-02-21 NOTE — TELEPHONE ENCOUNTER
----- Message from Tatiana Lion sent at 2/21/2017 11:31 AM CST -----  Contact: Wife  Arline, wife [phone] or [phone], Calling to reschedule appointment 3/15/17 and 3/21/17. Needs to change to month of April between 17th and 21st. Please advise. Thanks.

## 2017-03-14 ENCOUNTER — TELEPHONE (OUTPATIENT)
Dept: UROLOGY | Facility: CLINIC | Age: 72
End: 2017-03-14

## 2017-03-14 NOTE — TELEPHONE ENCOUNTER
Patient wants to discuss the upcoming procedure.  He has been researching and would like to discuss other options.  Patient has an appt 4/12 to discuss this.

## 2017-03-14 NOTE — TELEPHONE ENCOUNTER
----- Message from Tatiana Lion sent at 3/14/2017 12:41 PM CDT -----  Contact: Wife  Arline, wife [phone] cell, Calling for advise on up coming procedure. Requesting an appointment this month to discuss. Can not come 3/27 and 3/28. Please advise. Thanks.

## 2017-04-12 ENCOUNTER — HOSPITAL ENCOUNTER (OUTPATIENT)
Dept: PREADMISSION TESTING | Facility: HOSPITAL | Age: 72
Discharge: HOME | End: 2017-04-12
Attending: UROLOGY
Payer: MEDICARE

## 2017-04-12 ENCOUNTER — OFFICE VISIT (OUTPATIENT)
Dept: UROLOGY | Facility: CLINIC | Age: 72
End: 2017-04-12
Payer: MEDICARE

## 2017-04-12 VITALS
TEMPERATURE: 98 F | DIASTOLIC BLOOD PRESSURE: 82 MMHG | HEIGHT: 70 IN | HEART RATE: 83 BPM | WEIGHT: 236.56 LBS | SYSTOLIC BLOOD PRESSURE: 141 MMHG | BODY MASS INDEX: 33.87 KG/M2

## 2017-04-12 DIAGNOSIS — N13.8 BPH WITH OBSTRUCTION/LOWER URINARY TRACT SYMPTOMS: Primary | ICD-10-CM

## 2017-04-12 DIAGNOSIS — N40.1 BPH WITH OBSTRUCTION/LOWER URINARY TRACT SYMPTOMS: Primary | ICD-10-CM

## 2017-04-12 LAB
ANION GAP SERPL CALC-SCNC: 11 MMOL/L
BASOPHILS # BLD AUTO: 0 K/UL
BASOPHILS NFR BLD: 0.4 %
BUN SERPL-MCNC: 15 MG/DL
CALCIUM SERPL-MCNC: 9.7 MG/DL
CHLORIDE SERPL-SCNC: 109 MMOL/L
CO2 SERPL-SCNC: 21 MMOL/L
CREAT SERPL-MCNC: 1.2 MG/DL
DIFFERENTIAL METHOD: ABNORMAL
EOSINOPHIL # BLD AUTO: 0.2 K/UL
EOSINOPHIL NFR BLD: 4.1 %
ERYTHROCYTE [DISTWIDTH] IN BLOOD BY AUTOMATED COUNT: 14.4 %
EST. GFR  (AFRICAN AMERICAN): >60 ML/MIN/1.73 M^2
EST. GFR  (NON AFRICAN AMERICAN): >60 ML/MIN/1.73 M^2
GLUCOSE SERPL-MCNC: 211 MG/DL
HCT VFR BLD AUTO: 52.5 %
HGB BLD-MCNC: 17.1 G/DL
LYMPHOCYTES # BLD AUTO: 1.3 K/UL
LYMPHOCYTES NFR BLD: 20.9 %
MCH RBC QN AUTO: 30.1 PG
MCHC RBC AUTO-ENTMCNC: 32.6 %
MCV RBC AUTO: 92 FL
MONOCYTES # BLD AUTO: 0.8 K/UL
MONOCYTES NFR BLD: 13.7 %
NEUTROPHILS # BLD AUTO: 3.7 K/UL
NEUTROPHILS NFR BLD: 60.9 %
PLATELET # BLD AUTO: 199 K/UL
PMV BLD AUTO: 7.1 FL
POTASSIUM SERPL-SCNC: 4.5 MMOL/L
RBC # BLD AUTO: 5.7 M/UL
SODIUM SERPL-SCNC: 141 MMOL/L
WBC # BLD AUTO: 6 K/UL

## 2017-04-12 PROCEDURE — 36415 COLL VENOUS BLD VENIPUNCTURE: CPT

## 2017-04-12 PROCEDURE — 99213 OFFICE O/P EST LOW 20 MIN: CPT | Mod: S$PBB,,, | Performed by: UROLOGY

## 2017-04-12 PROCEDURE — 93005 ELECTROCARDIOGRAM TRACING: CPT

## 2017-04-12 PROCEDURE — 80048 BASIC METABOLIC PNL TOTAL CA: CPT

## 2017-04-12 PROCEDURE — 99999 PR PBB SHADOW E&M-EST. PATIENT-LVL III: CPT | Mod: PBBFAC,,, | Performed by: UROLOGY

## 2017-04-12 PROCEDURE — 93010 ELECTROCARDIOGRAM REPORT: CPT | Mod: ,,, | Performed by: INTERNAL MEDICINE

## 2017-04-12 PROCEDURE — 99900103 DSU ONLY-NO CHARGE-INITIAL HR (STAT)

## 2017-04-12 PROCEDURE — 85025 COMPLETE CBC W/AUTO DIFF WBC: CPT

## 2017-04-12 PROCEDURE — 99213 OFFICE O/P EST LOW 20 MIN: CPT | Mod: PBBFAC,PO | Performed by: UROLOGY

## 2017-04-12 RX ORDER — LOSARTAN POTASSIUM 50 MG/1
50 TABLET ORAL DAILY
Refills: 2 | COMMUNITY
Start: 2017-03-19 | End: 2017-04-12

## 2017-04-12 RX ORDER — LEVOTHYROXINE SODIUM 112 UG/1
112 TABLET ORAL DAILY
COMMUNITY
End: 2021-08-21

## 2017-04-12 RX ORDER — LORATADINE 10 MG/1
10 TABLET ORAL DAILY PRN
COMMUNITY
End: 2021-08-21

## 2017-04-12 RX ORDER — CETIRIZINE HYDROCHLORIDE 10 MG/1
10 TABLET ORAL DAILY PRN
COMMUNITY
End: 2021-08-21

## 2017-04-12 NOTE — PROGRESS NOTES
OFFICE NOTE    CHIEF COMPLAINT:  BPH with gross hematuria.    Mr. Archer is a 71-year-old male who was initially evaluated for gross hematuria.    The upper tract was found to be unremarkable.  The patient has a significant   enlargement of the prostate gland.  An ultrasound revealed that his prostate   volume is 98.1 cubic cm with a normal PSA of 2.8.  The patient has a urine flow   study shows significant restriction of the urine flow.  The patient's cystoscopy   revealed a trilobar prostatic enlargement with intravesical protrusion and the   patient has been suggested to undergo laser vaporization of the prostate under   general anesthesia on 04/18/2017.  He agreed.  The rationale, the risk and   complication of this procedure have been fully disclosed to the patient.  He   consented.  The orders were given, and he is going to have procedure performed   on that date.  I spent approximately 30 minutes with Mr. Archer.  He took also   written and visual information on the procedure, and he is going to preregister   today in the hospital with procedure done next week on Tuesday under general   anesthesia.      EOR/PN  dd: 04/12/2017 09:40:33 (CDT)  td: 04/13/2017 00:00:37 (CDT)  Doc ID   #[id number]  Job ID #[id number]    CC:

## 2017-04-17 ENCOUNTER — ANESTHESIA EVENT (OUTPATIENT)
Dept: SURGERY | Facility: HOSPITAL | Age: 72
End: 2017-04-17
Payer: MEDICARE

## 2017-04-18 ENCOUNTER — SURGERY (OUTPATIENT)
Age: 72
End: 2017-04-18

## 2017-04-18 ENCOUNTER — HOSPITAL ENCOUNTER (OUTPATIENT)
Facility: HOSPITAL | Age: 72
Discharge: HOME | End: 2017-04-18
Attending: UROLOGY | Admitting: UROLOGY
Payer: MEDICARE

## 2017-04-18 ENCOUNTER — ANESTHESIA (OUTPATIENT)
Dept: SURGERY | Facility: HOSPITAL | Age: 72
End: 2017-04-18
Payer: MEDICARE

## 2017-04-18 DIAGNOSIS — N40.1 BPH WITH OBSTRUCTION/LOWER URINARY TRACT SYMPTOMS: ICD-10-CM

## 2017-04-18 DIAGNOSIS — N13.8 BPH WITH OBSTRUCTION/LOWER URINARY TRACT SYMPTOMS: ICD-10-CM

## 2017-04-18 LAB — POCT GLUCOSE: 160 MG/DL (ref 70–110)

## 2017-04-18 PROCEDURE — 37000008 HC ANESTHESIA 1ST 15 MINUTES: Performed by: UROLOGY

## 2017-04-18 PROCEDURE — 52648 LASER SURGERY OF PROSTATE: CPT | Mod: ,,, | Performed by: UROLOGY

## 2017-04-18 PROCEDURE — 27200651 HC AIRWAY, LMA: Performed by: NURSE ANESTHETIST, CERTIFIED REGISTERED

## 2017-04-18 PROCEDURE — 71000015 HC POSTOP RECOV 1ST HR: Performed by: UROLOGY

## 2017-04-18 PROCEDURE — D9220A PRA ANESTHESIA: Mod: CRNA,,, | Performed by: NURSE ANESTHETIST, CERTIFIED REGISTERED

## 2017-04-18 PROCEDURE — 99900104 DSU ONLY-NO CHARGE-EA ADD'L HR (STAT): Performed by: UROLOGY

## 2017-04-18 PROCEDURE — 27201423 OPTIME MED/SURG SUP & DEVICES STERILE SUPPLY: Performed by: UROLOGY

## 2017-04-18 PROCEDURE — 63600175 PHARM REV CODE 636 W HCPCS: Performed by: UROLOGY

## 2017-04-18 PROCEDURE — 36000707: Performed by: UROLOGY

## 2017-04-18 PROCEDURE — D9220A PRA ANESTHESIA: Mod: ANES,,, | Performed by: ANESTHESIOLOGY

## 2017-04-18 PROCEDURE — 25000003 PHARM REV CODE 250: Performed by: NURSE ANESTHETIST, CERTIFIED REGISTERED

## 2017-04-18 PROCEDURE — 37000009 HC ANESTHESIA EA ADD 15 MINS: Performed by: UROLOGY

## 2017-04-18 PROCEDURE — 71000033 HC RECOVERY, INTIAL HOUR: Performed by: UROLOGY

## 2017-04-18 PROCEDURE — 25000003 PHARM REV CODE 250: Performed by: UROLOGY

## 2017-04-18 PROCEDURE — 25000003 PHARM REV CODE 250: Performed by: ANESTHESIOLOGY

## 2017-04-18 PROCEDURE — 36000706: Performed by: UROLOGY

## 2017-04-18 PROCEDURE — 99900103 DSU ONLY-NO CHARGE-INITIAL HR (STAT): Performed by: UROLOGY

## 2017-04-18 PROCEDURE — 63600175 PHARM REV CODE 636 W HCPCS: Performed by: NURSE ANESTHETIST, CERTIFIED REGISTERED

## 2017-04-18 PROCEDURE — 71000039 HC RECOVERY, EACH ADD'L HOUR: Performed by: UROLOGY

## 2017-04-18 RX ORDER — TRAMADOL HYDROCHLORIDE AND ACETAMINOPHEN 37.5; 325 MG/1; MG/1
1 TABLET, FILM COATED ORAL EVERY 6 HOURS PRN
Qty: 30 TABLET | Refills: 0 | Status: SHIPPED | OUTPATIENT
Start: 2017-04-18 | End: 2017-05-03

## 2017-04-18 RX ORDER — PHENAZOPYRIDINE HYDROCHLORIDE 200 MG/1
200 TABLET, FILM COATED ORAL ONCE AS NEEDED
Status: COMPLETED | OUTPATIENT
Start: 2017-04-18 | End: 2017-04-18

## 2017-04-18 RX ORDER — SODIUM CHLORIDE 0.9 % (FLUSH) 0.9 %
3 SYRINGE (ML) INJECTION
Status: DISCONTINUED | OUTPATIENT
Start: 2017-04-18 | End: 2017-04-18 | Stop reason: HOSPADM

## 2017-04-18 RX ORDER — HYDROMORPHONE HYDROCHLORIDE 2 MG/ML
0.2 INJECTION, SOLUTION INTRAMUSCULAR; INTRAVENOUS; SUBCUTANEOUS EVERY 5 MIN PRN
Status: DISCONTINUED | OUTPATIENT
Start: 2017-04-18 | End: 2017-04-18 | Stop reason: HOSPADM

## 2017-04-18 RX ORDER — SODIUM CHLORIDE, SODIUM LACTATE, POTASSIUM CHLORIDE, CALCIUM CHLORIDE 600; 310; 30; 20 MG/100ML; MG/100ML; MG/100ML; MG/100ML
75 INJECTION, SOLUTION INTRAVENOUS CONTINUOUS
Status: DISCONTINUED | OUTPATIENT
Start: 2017-04-18 | End: 2017-04-18 | Stop reason: HOSPADM

## 2017-04-18 RX ORDER — ONDANSETRON 2 MG/ML
INJECTION INTRAMUSCULAR; INTRAVENOUS
Status: DISCONTINUED | OUTPATIENT
Start: 2017-04-18 | End: 2017-04-18

## 2017-04-18 RX ORDER — SODIUM CHLORIDE, SODIUM LACTATE, POTASSIUM CHLORIDE, CALCIUM CHLORIDE 600; 310; 30; 20 MG/100ML; MG/100ML; MG/100ML; MG/100ML
INJECTION, SOLUTION INTRAVENOUS CONTINUOUS
Status: DISCONTINUED | OUTPATIENT
Start: 2017-04-18 | End: 2017-04-18 | Stop reason: HOSPADM

## 2017-04-18 RX ORDER — DEXAMETHASONE SODIUM PHOSPHATE 4 MG/ML
INJECTION, SOLUTION INTRA-ARTICULAR; INTRALESIONAL; INTRAMUSCULAR; INTRAVENOUS; SOFT TISSUE
Status: DISCONTINUED | OUTPATIENT
Start: 2017-04-18 | End: 2017-04-18

## 2017-04-18 RX ORDER — OXYCODONE HYDROCHLORIDE 5 MG/1
5 TABLET ORAL ONCE AS NEEDED
Status: COMPLETED | OUTPATIENT
Start: 2017-04-19 | End: 2017-04-18

## 2017-04-18 RX ORDER — OXYBUTYNIN CHLORIDE 5 MG/1
5 TABLET, EXTENDED RELEASE ORAL DAILY
Qty: 30 TABLET | Refills: 0 | Status: SHIPPED | OUTPATIENT
Start: 2017-04-18 | End: 2017-12-04

## 2017-04-18 RX ORDER — MEPERIDINE HYDROCHLORIDE 25 MG/ML
12.5 INJECTION INTRAMUSCULAR; INTRAVENOUS; SUBCUTANEOUS ONCE AS NEEDED
Status: DISCONTINUED | OUTPATIENT
Start: 2017-04-18 | End: 2017-04-18 | Stop reason: HOSPADM

## 2017-04-18 RX ORDER — CEFAZOLIN SODIUM 2 G/50ML
2 SOLUTION INTRAVENOUS
Status: COMPLETED | OUTPATIENT
Start: 2017-04-18 | End: 2017-04-18

## 2017-04-18 RX ORDER — FENTANYL CITRATE 50 UG/ML
25 INJECTION, SOLUTION INTRAMUSCULAR; INTRAVENOUS EVERY 5 MIN PRN
Status: DISCONTINUED | OUTPATIENT
Start: 2017-04-18 | End: 2017-04-18 | Stop reason: HOSPADM

## 2017-04-18 RX ORDER — MIDAZOLAM HYDROCHLORIDE 1 MG/ML
INJECTION, SOLUTION INTRAMUSCULAR; INTRAVENOUS
Status: DISCONTINUED | OUTPATIENT
Start: 2017-04-18 | End: 2017-04-18

## 2017-04-18 RX ORDER — ONDANSETRON 2 MG/ML
4 INJECTION INTRAMUSCULAR; INTRAVENOUS ONCE
Status: DISCONTINUED | OUTPATIENT
Start: 2017-04-18 | End: 2017-04-18 | Stop reason: HOSPADM

## 2017-04-18 RX ORDER — FENTANYL CITRATE 50 UG/ML
INJECTION, SOLUTION INTRAMUSCULAR; INTRAVENOUS
Status: DISCONTINUED | OUTPATIENT
Start: 2017-04-18 | End: 2017-04-18

## 2017-04-18 RX ORDER — CEPHALEXIN 500 MG/1
500 CAPSULE ORAL EVERY 12 HOURS
Qty: 10 CAPSULE | Refills: 0 | Status: SHIPPED | OUTPATIENT
Start: 2017-04-18 | End: 2017-04-23

## 2017-04-18 RX ORDER — LIDOCAINE HYDROCHLORIDE 10 MG/ML
INJECTION, SOLUTION INTRAVENOUS
Status: DISCONTINUED | OUTPATIENT
Start: 2017-04-18 | End: 2017-04-18

## 2017-04-18 RX ORDER — PROPOFOL 10 MG/ML
VIAL (ML) INTRAVENOUS
Status: DISCONTINUED | OUTPATIENT
Start: 2017-04-18 | End: 2017-04-18

## 2017-04-18 RX ORDER — ACETAMINOPHEN 10 MG/ML
INJECTION, SOLUTION INTRAVENOUS
Status: DISCONTINUED | OUTPATIENT
Start: 2017-04-18 | End: 2017-04-18

## 2017-04-18 RX ORDER — LIDOCAINE HYDROCHLORIDE 20 MG/ML
JELLY TOPICAL
Status: DISCONTINUED | OUTPATIENT
Start: 2017-04-18 | End: 2017-04-18 | Stop reason: HOSPADM

## 2017-04-18 RX ORDER — LIDOCAINE HYDROCHLORIDE 10 MG/ML
1 INJECTION, SOLUTION EPIDURAL; INFILTRATION; INTRACAUDAL; PERINEURAL ONCE
Status: COMPLETED | OUTPATIENT
Start: 2017-04-18 | End: 2017-04-18

## 2017-04-18 RX ORDER — DIPHENHYDRAMINE HYDROCHLORIDE 50 MG/ML
25 INJECTION INTRAMUSCULAR; INTRAVENOUS EVERY 6 HOURS PRN
Status: DISCONTINUED | OUTPATIENT
Start: 2017-04-18 | End: 2017-04-18 | Stop reason: HOSPADM

## 2017-04-18 RX ADMIN — SODIUM CHLORIDE, SODIUM LACTATE, POTASSIUM CHLORIDE, AND CALCIUM CHLORIDE: .6; .31; .03; .02 INJECTION, SOLUTION INTRAVENOUS at 07:04

## 2017-04-18 RX ADMIN — LIDOCAINE HYDROCHLORIDE 10 MG: 10 INJECTION, SOLUTION EPIDURAL; INFILTRATION; INTRACAUDAL; PERINEURAL at 07:04

## 2017-04-18 RX ADMIN — OXYCODONE HYDROCHLORIDE 5 MG: 5 TABLET ORAL at 11:04

## 2017-04-18 RX ADMIN — FENTANYL CITRATE 25 MCG: 50 INJECTION INTRAMUSCULAR; INTRAVENOUS at 11:04

## 2017-04-18 RX ADMIN — CEFAZOLIN SODIUM 2 G: 2 SOLUTION INTRAVENOUS at 10:04

## 2017-04-18 RX ADMIN — PHENAZOPYRIDINE HYDROCHLORIDE 200 MG: 200 TABLET ORAL at 11:04

## 2017-04-18 RX ADMIN — FENTANYL CITRATE 25 MCG: 50 INJECTION INTRAMUSCULAR; INTRAVENOUS at 10:04

## 2017-04-18 RX ADMIN — MIDAZOLAM 2 MG: 1 INJECTION INTRAMUSCULAR; INTRAVENOUS at 10:04

## 2017-04-18 RX ADMIN — LIDOCAINE HYDROCHLORIDE 100 MG: 10 INJECTION, SOLUTION INTRAVENOUS at 10:04

## 2017-04-18 RX ADMIN — LIDOCAINE HYDROCHLORIDE 10 ML: 20 JELLY TOPICAL at 10:04

## 2017-04-18 RX ADMIN — FENTANYL CITRATE 100 MCG: 50 INJECTION INTRAMUSCULAR; INTRAVENOUS at 10:04

## 2017-04-18 RX ADMIN — DEXAMETHASONE SODIUM PHOSPHATE 4 MG: 4 INJECTION, SOLUTION INTRAMUSCULAR; INTRAVENOUS at 10:04

## 2017-04-18 RX ADMIN — PROPOFOL 180 MG: 10 INJECTION, EMULSION INTRAVENOUS at 10:04

## 2017-04-18 RX ADMIN — ONDANSETRON 4 MG: 2 INJECTION, SOLUTION INTRAMUSCULAR; INTRAVENOUS at 10:04

## 2017-04-18 RX ADMIN — ACETAMINOPHEN 1000 MG: 10 INJECTION, SOLUTION INTRAVENOUS at 10:04

## 2017-04-18 RX ADMIN — FENTANYL CITRATE 50 MCG: 50 INJECTION INTRAMUSCULAR; INTRAVENOUS at 10:04

## 2017-04-18 NOTE — TRANSFER OF CARE
"Anesthesia Transfer of Care Note    Patient: Mauri Archer Jr.    Procedure(s) Performed: Procedure(s) (LRB):  PROSTATECTOMY-TRANSURETHRAL WITH GREENLIGHT LASER (N/A)    Patient location: PACU    Anesthesia Type: general    Transport from OR: Transported from OR on 2-3 L/min O2 by NC with adequate spontaneous ventilation    Post pain: adequate analgesia    Post assessment: no apparent anesthetic complications and tolerated procedure well    Post vital signs: stable    Level of consciousness: awake, alert and oriented    Nausea/Vomiting: no nausea/vomiting    Complications: none          Last vitals:   Visit Vitals    /74 (BP Location: Right arm, Patient Position: Lying, BP Method: Automatic)    Pulse 69    Temp 36.6 °C (97.9 °F) (Oral)    Resp 18    Ht 5' 10" (1.778 m)    Wt 104.3 kg (230 lb)    SpO2 96%    BMI 33 kg/m2     "

## 2017-04-18 NOTE — PLAN OF CARE
Meets criteria for discharge. Discharged to home with wife. Denies nausea. Tolerating fluids. Denies pain. Steady gait. Voiding without difficulty. Urethral catheter to leg bag.Discharge instructions reviewed and printed handout given. Verbalized understanding.

## 2017-04-18 NOTE — PLAN OF CARE
RECOVERY  Pt is calm awake, now distress  He complains of burning and urgency  Pain med and pyridium given per orders  Family was updated on pt recovery and will transfer to post op   Patient is tolerating po fluids  Vital signs stable

## 2017-04-18 NOTE — OP NOTE
DATE OF OPERATION: 4/18/2017  Surgeon(s) and Role:     * Dave Arevalo MD - Primary      PREOPERATIVE DIAGNOSIS:  BPH producing bladder outlet obstruction.    POSTOPERATIVE DIAGNOSIS:  BPH producing bladder outlet obstruction.    OPERATION PERFORMED:  Laser vaporization of the prostate using a GreenLight laser.    The laser was set at 120 to 150  W of energy.    TOTAL ENERGY USED: [id number] J.      The lasing time was 28:45.minutes    With the patient under satisfactory general anesthesia and placed in the   lithotomy position, the genitalia were prepped and draped in the usual manner.    The urethra was treated with Xylocaine jelly 2%.  A continuous flow cystoscope   was placed through the urethra into the bladder.  The landmarks of the   vaporization were identified, the trigone, ureteral orifices, bladder neck,   external urinary sphincter and verumontanum.  The vaporization was carried out   first vaporizing the median lobe, then the right lateral lobe and at the end,   the left lateral lobe.    Care was taken to avoid injuring the trigone, the verumontanum, external urinary   sphincter and bladder neck.  Hemostasis was secured using the same laser energy   with the coagulation mode.    A good satisfactory opening of the bladder neck was achieved.  At this point,   the continuous flow cystoscope was removed and replaced by a coude Hurley   catheter #[id number].  The bladder was irrigated.  Clear return was obtained.  At   this point, the patient was transferred to the recovery room in satisfactory   condition.

## 2017-04-18 NOTE — DISCHARGE INSTRUCTIONS
Discharge Instructions for Laser Prostatectomy  You had a procedure called laser prostatectomy. During this surgical procedure, all or part of your prostate gland was removed to relieve urinary problems due to prostate enlargement. Here are some instructions to help you care for yourself once you have returned home.  Activity  · Limit physical activity for the first week after surgery. This will allow your body time to rest and heal.  · Ask your healthcare provider before resuming your normal activity level.  · Avoid long car rides. Dont drive until your healthcare provider says its OK. This is usually after your catheter is removed and you are no longer taking pain medication.  · Dont lift anything heavier than 10 pounds until your health care provider says its OK.  · Avoid climbing stairs and strenuous exercise. Also, do not do chores, such as mowing the lawn or vacuuming, until your healthcare provider says its OK.  Other home care  · Finish all of the antibiotics your health care provider prescribed to you, even if you feel better. Antibiotics help keep you from getting an infection.  · Eat high-fiber foods to avoid constipation. Also, use laxatives, stool softeners, or enemas as directed by your healthcare provider.  · You should receive an information sheet about caring for your urinary catheter. Ask for a sheet if you did not receive one. Follow the sheets instructions, some of which should include:  ¨ Keep the catheter well secured to the leg or abdomen.  ¨ Use leg bags, external (straight drainage) bags, or both.  ¨ Empty the bag when its half full. You may see some blood in the bag. Dont be alarmed. This is normal after surgery and while the catheter is in place.  ¨ Use plain soap and water to wash the head of your penis daily, or more often if needed.  ¨ Wash the catheter daily with plain soap and water to avoid infection.  · Return to your normal diet.  · Drink plenty of fluids during the day  (enough to keep your urine light colored). This will help keep urine flowing freely.  · Shower as usual.  · Wear loose-fitting sweatpants while the catheter is in place. Sweatpants are more comfortable than other pant styles.   Follow-up  Make a follow-up appointment as directed by your healthcare provider.     When to seek medical help  Call 911 right away if you have:  · Shortness of breath.  Otherwise, call your healthcare provider right away if you have:  · Fever of 100.4°F (38°C) or higher, or shaking chills  · Hives or skin rash  · Nausea, vomiting, or diarrhea  · Catheter falls out or stops draining  · Foul-smelling discharge from your catheter or urethra (at tip of penis)   Date Last Reviewed: 9/24/2014  © 1760-6572 [employer]. [address] [place] [address], [place] [address]. All rights reserved. This information is not intended as a substitute for professional medical care. Always follow your healthcare professional's instructions.      General Information:    1.  Do not drink alcoholic beverages including beer for 24 hours or as long as you are on pain medication..  2.  Do not drive a motor vehicle, operate machinery or power tools, or signs legal papers for 24 hours or as long as you are on pain medication.   3.  You may experience light-headedness, dizziness, and sleepiness following surgery. Please do not stay alone. A responsible adult should be with you for this 24 hour period.  4.  Go home and rest.    5. Progress slowly to a normal diet unless instructed.  Otherwise, begin with liquids such as soft drinks, then soup and crackers working up to solid foods. Drink plenty of nonalcoholic fluids.  6.  Certain anesthetics and pain medications produce nausea and vomiting in certain       individuals. If nausea becomes a problem at home, call you doctor.    7. A nurse will be calling you sometime after surgery. Do not be alarmed. This is our way of finding out how you are doing.    8. Several times  "every hour while you are awake, take 2-3 deep breaths and cough. If you had stomach surgery hold a pillow or rolled towel firmly against your stomach before you cough. This will help with any pain the cough might cause.  9. Several times every hour while you are awake, pump and flex your feet 5-6 times and do foot circles. This will help prevent blood clots.    10.Call your doctor for severe pain, bleeding, fever, or signs or symptoms of infection (pain, swelling, redness, foul odor, drainage).    Discharge Instructions: After Your Surgery/Procedure  Youve just had surgery. During surgery you were given medicine called anesthesia to keep you relaxed and free of pain. After surgery you may have some pain or nausea. This is common. Here are some tips for feeling better and getting well after surgery.     Stay on schedule with your medication.   Going home  Your doctor or nurse will show you how to take care of yourself when you go home. He or she will also answer your questions. Have an adult family member or friend drive you home.      For your safety we recommend these precaution for the first 24 hours after your procedure:  · Do not drive or use heavy equipment.  · Do not make important decisions or sign legal papers.  · Do not drink alcohol.  · Have someone stay with you, if needed. He or she can watch for problems and help keep you safe.  · Your concentration, balance, coordination, and judgement may be impaired for many hours after anesthesia.  Use caution when ambulating or standing up.     · You may feel weak and "washed out" after anesthesia and surgery.      Subtle residual effects of general anesthesia or sedation with regional / local anesthesia can last more than 24 hours.  Rest for the remainder of the day or longer if your Doctor/Surgeon has advised you to do so.  Although you may feel normal within the first 24 hours, your reflexes and mental ability may be impaired without you realizing it.  You may " feel dizzy, lightheaded or sleepy for 24 hours or longer.      Be sure to go to all follow-up visits with your doctor. And rest after your surgery for as long as your doctor tells you to.  Coping with pain  If you have pain after surgery, pain medicine will help you feel better. Take it as told, before pain becomes severe. Also, ask your doctor or pharmacist about other ways to control pain. This might be with heat, ice, or relaxation. And follow any other instructions your surgeon or nurse gives you.  Tips for taking pain medicine  To get the best relief possible, remember these points:  · Pain medicines can upset your stomach. Taking them with a little food may help.  · Most pain relievers taken by mouth need at least 20 to 30 minutes to start to work.  · Taking medicine on a schedule can help you remember to take it. Try to time your medicine so that you can take it before starting an activity. This might be before you get dressed, go for a walk, or sit down for dinner.  · Constipation is a common side effect of pain medicines. Call your doctor before taking any medicines such as laxatives or stool softeners to help ease constipation. Also ask if you should skip any foods. Drinking lots of fluids and eating foods such as fruits and vegetables that are high in fiber can also help. Remember, do not take laxatives unless your surgeon has prescribed them.  · Drinking alcohol and taking pain medicine can cause dizziness and slow your breathing. It can even be deadly. Do not drink alcohol while taking pain medicine.  · Pain medicine can make you react more slowly to things. Do not drive or run machinery while taking pain medicine.  Your health care provider may tell you to take acetaminophen to help ease your pain. Ask him or her how much you are supposed to take each day. Acetaminophen or other pain relievers may interact with your prescription medicines or other over-the-counter (OTC) drugs. Some prescription  medicines have acetaminophen and other ingredients. Using both prescription and OTC acetaminophen for pain can cause you to overdose. Read the labels on your OTC medicines with care. This will help you to clearly know the list of ingredients, how much to take, and any warnings. It may also help you not take too much acetaminophen. If you have questions or do not understand the information, ask your pharmacist or health care provider to explain it to you before you take the OTC medicine.  Managing nausea  Some people have an upset stomach after surgery. This is often because of anesthesia, pain, or pain medicine, or the stress of surgery. These tips will help you handle nausea and eat healthy foods as you get better. If you were on a special food plan before surgery, ask your doctor if you should follow it while you get better. These tips may help:  · Do not push yourself to eat. Your body will tell you when to eat and how much.  · Start off with clear liquids and soup. They are easier to digest.  · Next try semi-solid foods, such as mashed potatoes, applesauce, and gelatin, as you feel ready.  · Slowly move to solid foods. Dont eat fatty, rich, or spicy foods at first.  · Do not force yourself to have 3 large meals a day. Instead eat smaller amounts more often.  · Take pain medicines with a small amount of solid food, such as crackers or toast, to avoid nausea.     Call your surgeon if  · You still have pain an hour after taking medicine. The medicine may not be strong enough.  · You feel too sleepy, dizzy, or groggy. The medicine may be too strong.  · You have side effects like nausea, vomiting, or skin changes, such as rash, itching, or hives.       If you have obstructive sleep apnea  You were given anesthesia medicine during surgery to keep you comfortable and free of pain. After surgery, you may have more apnea spells because of this medicine and other medicines you were given. The spells may last longer than  usual.   At home:  · Keep using the continuous positive airway pressure (CPAP) device when you sleep. Unless your health care provider tells you not to, use it when you sleep, day or night. CPAP is a common device used to treat obstructive sleep apnea.  · Talk with your provider before taking any pain medicine, muscle relaxants, or sedatives. Your provider will tell you about the possible dangers of taking these medicines.  © 1377-6263 [email or web address]. [address] [place] [address], [place] [address]. All rights reserved. This information is not intended as a substitute for professional medical care. Always follow your healthcare professional's instructions.

## 2017-04-18 NOTE — H&P
Mr. Archer is a 71-year-old male who was initially evaluated for gross hematuria.  The upper tract was found to be unremarkable. The patient has a significant   enlargement of the prostate gland. An ultrasound revealed that his prostate   volume is 98.1 cubic cm with a normal PSA of 2.8. The patient has a urine flow   study shows significant restriction of the urine flow. The patient's cystoscopy  revealed a trilobar prostatic enlargement with intravesical protrusion and the   patient has been suggested to undergo laser vaporization of the prostate under   general anesthesia on 04/18/2017. He agreed. The rationale, the risk and   complication of this procedure have been fully disclosed to the patient. He   consented. The orders were given, and he is going to have procedure performed   on that date. I spent approximately 30 minutes with Mr. Archer. He took also   written and visual information on the procedure, and he is going to preregister   today in the hospital with procedure done next week on Tuesday under general   Anesthesia.      Review of Systems   Constitutional: Negative for activity change and appetite change.   HENT: Negative.   Eyes: Negative for discharge.   Respiratory: Negative for cough and shortness of breath.   Cardiovascular: Negative for chest pain and palpitations.   Gastrointestinal: Negative for abdominal distention, abdominal pain, constipation and vomiting.   Genitourinary: Positive for hematuria. Negative for discharge, dysuria, flank pain, frequency, testicular pain and urgency.   Musculoskeletal: Negative for arthralgias.   Skin: Negative for rash.   Neurological: Negative for dizziness.   Psychiatric/Behavioral: The patient is not nervous/anxious.       Objective:      Physical Exam   Constitutional: He appears well-developed and well-nourished.   HENT:   Head: Normocephalic.   Eyes: Pupils are equal, round, and reactive to light.   Neck: Normal range of motion.   Cardiovascular: Normal  rate.   Pulmonary/Chest: Effort normal.   Abdominal: Soft. He exhibits no distension and no mass. There is no tenderness. Hernia confirmed negative in the right inguinal area and confirmed negative in the left inguinal area.   Genitourinary: Rectum normal and penis normal. Rectal exam shows no external hemorrhoid, no mass and no tenderness. Prostate is enlarged. Prostate is not tender. Right testis shows no mass and no tenderness. Left testis shows no mass and no tenderness. No discharge found.   Musculoskeletal: Normal range of motion.   Neurological: He is alert.   Skin: Skin is warm.     Psychiatric: He has a normal mood and affect.       Assessment:       Trilobar BPH with FONG       PLAN: PVP under anesthesia. Patient fully informed of the rationale, risks and complications, he agree and consented.

## 2017-04-18 NOTE — BRIEF OP NOTE
Brief Operative Note     Surgery Date: 4/18/2017    Surgeon:   Dave Arevalo MD     Pre-op Diagnosis:  BPH with obstruction/lower urinary tract symptoms [N40.1, N13.8]  BPH with obstruction/lower urinary tract symptoms [N40.1, N13.8]    Post-op Diagnosis:  Same    Procedure:  Procedure(s) (LRB):  PROSTATECTOMY-TRANSURETHRAL WITH GREENLIGHT LASER (N/A)    Anesthesia: Local MAC    Findings/Key Components:  See Op Report    Estimated Blood Loss: Minimal                                                                                                                           Discharge Summary    Admit Date: 4/18/2017     Attending Physician: Dave Arevalo MD     Discharge Physician: Dave Arevalo MD      Discharge Date: 4/18/2017    Treatments/Procedures: Procedure(s) (LRB):  PROSTATECTOMY-TRANSURETHRAL WITH GREENLIGHT LASER (N/A)  Final Diagnosis:BPH with FONG  Hospital Course: PVP done as planned.  F. U. Dr. Arevalo 2 days for catheter removal    Disposition: Home or Self Care     Patient Instructions:     Current Discharge Medication List:         Mauri Archer Jr.   Home Medication Instructions JUNG:[id number]    Printed on:04/18/17 8040   Medication Information                      allopurinol (ZYLOPRIM) 300 MG tablet  Take 300 mg by mouth once daily.             aspirin (ECOTRIN) 81 MG EC tablet  Take 81 mg by mouth once daily.             calcium citrate-vitamin D3 315-200 mg (CITRACAL+D) 315-200 mg-unit per tablet  Take 1 tablet by mouth nightly.              cephALEXin (KEFLEX) 500 MG capsule  Take 1 capsule (500 mg total) by mouth every 12 (twelve) hours.             cetirizine (ZYRTEC) 10 MG tablet  Take 10 mg by mouth daily as needed for Allergies.             ciclesonide (OMNARIS) 50 mcg Spry  2 sprays by Each Nare route daily as needed.             empagliflozin (JARDIANCE) 10 mg Tab  Take 1 tablet by mouth once daily.             FREESTYLE LITE STRIPS Strp                glimepiride (AMARYL) 4 MG tablet  Take 4 mg by mouth 2 (two) times daily.              insulin detemir (LEVEMIR) 100 unit/mL injection  Inject 36 Units into the skin every evening.              levmefolate-B6 phos-methyl-B12 (METANX) 3-35-2 mg Tab  Take 1 tablet by mouth once daily.             levothyroxine (SYNTHROID) 112 MCG tablet  Take 112 mcg by mouth once daily. 2 TABS             loratadine (CLARITIN) 10 mg tablet  Take 10 mg by mouth daily as needed for Allergies.             omega-3 acid ethyl esters (LOVAZA) 1 gram capsule  Take 2 g by mouth 2 (two) times daily. 1 am and 2 at night             oxybutynin (DITROPAN XL) 5 MG TR24  Take 1 tablet (5 mg total) by mouth once daily.             pravastatin (PRAVACHOL) 40 MG tablet  Take 40 mg by mouth nightly.              sitagliptan-metformin (JANUMET) 50-1,000 mg per tablet  Take 1 tablet by mouth 2 (two) times daily with meals.             tamsulosin (FLOMAX) 0.4 mg Cp24  Take 1 capsule (0.4 mg total) by mouth once daily.             tramadol-acetaminophen 37.5-325 mg (ULTRACET) 37.5-325 mg Tab  Take 1 tablet by mouth every 6 (six) hours as needed.             zolpidem (AMBIEN) 10 mg Tab  Take 5 mg by mouth nightly as needed.                     Current Discharge Medication List      START taking these medications    Details   cephALEXin (KEFLEX) 500 MG capsule Take 1 capsule (500 mg total) by mouth every 12 (twelve) hours.  Qty: 10 capsule, Refills: 0      oxybutynin (DITROPAN XL) 5 MG TR24 Take 1 tablet (5 mg total) by mouth once daily.  Qty: 30 tablet, Refills: 0      tramadol-acetaminophen 37.5-325 mg (ULTRACET) 37.5-325 mg Tab Take 1 tablet by mouth every 6 (six) hours as needed.  Qty: 30 tablet, Refills: 0         CONTINUE these medications which have NOT CHANGED    Details   allopurinol (ZYLOPRIM) 300 MG tablet Take 300 mg by mouth once daily.      cetirizine (ZYRTEC) 10 MG tablet Take 10 mg by mouth daily as needed for Allergies.      ciclesonide  (OMNARIS) 50 mcg Spry 2 sprays by Each Nare route daily as needed.      empagliflozin (JARDIANCE) 10 mg Tab Take 1 tablet by mouth once daily.      FREESTYLE LITE STRIPS Strp Refills: 6      glimepiride (AMARYL) 4 MG tablet Take 4 mg by mouth 2 (two) times daily.       insulin detemir (LEVEMIR) 100 unit/mL injection Inject 36 Units into the skin every evening.       levothyroxine (SYNTHROID) 112 MCG tablet Take 112 mcg by mouth once daily. 2 TABS      loratadine (CLARITIN) 10 mg tablet Take 10 mg by mouth daily as needed for Allergies.      pravastatin (PRAVACHOL) 40 MG tablet Take 40 mg by mouth nightly.       sitagliptan-metformin (JANUMET) 50-1,000 mg per tablet Take 1 tablet by mouth 2 (two) times daily with meals.      tamsulosin (FLOMAX) 0.4 mg Cp24 Take 1 capsule (0.4 mg total) by mouth once daily.  Qty: 90 capsule, Refills: 3    Associated Diagnoses: Urinary frequency      zolpidem (AMBIEN) 10 mg Tab Take 5 mg by mouth nightly as needed.      aspirin (ECOTRIN) 81 MG EC tablet Take 81 mg by mouth once daily.      calcium citrate-vitamin D3 315-200 mg (CITRACAL+D) 315-200 mg-unit per tablet Take 1 tablet by mouth nightly.       levmefolate-B6 phos-methyl-B12 (METANX) 3-35-2 mg Tab Take 1 tablet by mouth once daily.      omega-3 acid ethyl esters (LOVAZA) 1 gram capsule Take 2 g by mouth 2 (two) times daily. 1 am and 2 at night             Discharge Procedure Orders:      Discharge Procedure Orders  Diet general     Activity as tolerated

## 2017-04-18 NOTE — IP AVS SNAPSHOT
[address] [place] [address]  Phone: [phone]           Patient Discharge Instructions   Our goal is to set you up for success. This packet includes information on your condition, medications, and your home care.  It will help you care for yourself to prevent having to return to the hospital.     Please ask your nurse if you have any questions.      There are many details to remember when preparing to leave the hospital. Here is what you will need to do:    1. Take your medicine. If you are prescribed medications, review your Medication List on the following pages. You may have new medications to  at the pharmacy and others that you'll need to stop taking. Review the instructions for how and when to take your medications. Talk with your doctor or nurses if you are unsure of what to do.     2. Go to your follow-up appointments. Specific follow-up information is listed in the following pages. Your may be contacted by a nurse or clinical provider about future appointments. Be sure we have all of the phone numbers to reach you. Please contact your provider's office if you are unable to make an appointment.     3. Watch for warning signs. Your doctor or nurse will give you detailed warning signs to watch for and when to call for assistance. These instructions may also include educational information about your condition. If you experience any of warning signs to your health, call your doctor.           [facility]  Unless otherwise directed by your provider, please   contact [facility], our nurse care line   that is available for 24/7 assistance.     [phone] (toll-free)     Registered nurses in the [facility]   provide: appointment scheduling, clinical advisement, health education, and other advisory services.                  ** Verify the list of medication(s) below is accurate and up to date. Carry this with you in case of  emergency. If your medications have changed, please notify your healthcare provider.             Medication List      START taking these medications        Additional Info                      cephALEXin 500 MG capsule   Commonly known as:  KEFLEX   Quantity:  10 capsule   Refills:  0   Dose:  500 mg    Instructions:  Take 1 capsule (500 mg total) by mouth every 12 (twelve) hours.     Begin Date    AM    Noon    PM    Bedtime       oxybutynin 5 MG Tr24   Commonly known as:  DITROPAN XL   Quantity:  30 tablet   Refills:  0   Dose:  5 mg    Instructions:  Take 1 tablet (5 mg total) by mouth once daily.     Begin Date    AM    Noon    PM    Bedtime       tramadol-acetaminophen 37.5-325 mg 37.5-325 mg Tab   Commonly known as:  ULTRACET   Quantity:  30 tablet   Refills:  0   Dose:  1 tablet    Instructions:  Take 1 tablet by mouth every 6 (six) hours as needed.     Begin Date    AM    Noon    PM    Bedtime         CHANGE how you take these medications        Additional Info                      tamsulosin 0.4 mg Cp24   Commonly known as:  FLOMAX   Quantity:  90 capsule   Refills:  3   Dose:  0.4 mg   What changed:  when to take this    Instructions:  Take 1 capsule (0.4 mg total) by mouth once daily.     Begin Date    AM    Noon    PM    Bedtime         CONTINUE taking these medications        Additional Info                      allopurinol 300 MG tablet   Commonly known as:  ZYLOPRIM   Refills:  0   Dose:  300 mg    Instructions:  Take 300 mg by mouth once daily.     Begin Date    AM    Noon    PM    Bedtime       aspirin 81 MG EC tablet   Commonly known as:  ECOTRIN   Refills:  0   Dose:  81 mg    Instructions:  Take 81 mg by mouth once daily.     Begin Date    AM    Noon    PM    Bedtime       calcium citrate-vitamin D3 315-200 mg 315-200 mg-unit per tablet   Commonly known as:  CITRACAL+D   Refills:  0   Dose:  1 tablet    Instructions:  Take 1 tablet by mouth nightly.     Begin Date    AM    Noon    PM    Bedtime        cetirizine 10 MG tablet   Commonly known as:  ZYRTEC   Refills:  0   Dose:  10 mg    Instructions:  Take 10 mg by mouth daily as needed for Allergies.     Begin Date    AM    Noon    PM    Bedtime       FREESTYLE LITE STRIPS Strp   Refills:  6   Generic drug:  blood sugar diagnostic      Begin Date    AM    Noon    PM    Bedtime       glimepiride 4 MG tablet   Commonly known as:  AMARYL   Refills:  0   Dose:  4 mg    Instructions:  Take 4 mg by mouth 2 (two) times daily.     Begin Date    AM    Noon    PM    Bedtime       insulin detemir 100 unit/mL injection   Commonly known as:  LEVEMIR   Refills:  0   Dose:  36 Units    Instructions:  Inject 36 Units into the skin every evening.     Begin Date    AM    Noon    PM    Bedtime       JARDIANCE 10 mg Tab   Refills:  0   Dose:  1 tablet   Generic drug:  empagliflozin    Instructions:  Take 1 tablet by mouth once daily.     Begin Date    AM    Noon    PM    Bedtime       levothyroxine 112 MCG tablet   Commonly known as:  SYNTHROID   Refills:  0   Dose:  112 mcg    Instructions:  Take 112 mcg by mouth once daily. 2 TABS     Begin Date    AM    Noon    PM    Bedtime       loratadine 10 mg tablet   Commonly known as:  CLARITIN   Refills:  0   Dose:  10 mg    Instructions:  Take 10 mg by mouth daily as needed for Allergies.     Begin Date    AM    Noon    PM    Bedtime       METANX 3-35-2 mg Tab   Refills:  0   Dose:  1 tablet   Generic drug:  mecobal-levomefolat Ca-B6 phos    Instructions:  Take 1 tablet by mouth once daily.     Begin Date    AM    Noon    PM    Bedtime       omega-3 acid ethyl esters 1 gram capsule   Commonly known as:  LOVAZA   Refills:  0   Dose:  2 g    Instructions:  Take 2 g by mouth 2 (two) times daily. 1 am and 2 at night     Begin Date    AM    Noon    PM    Bedtime       OMNARIS 50 mcg Spry   Refills:  0   Dose:  2 spray   Generic drug:  ciclesonide    Instructions:  2 sprays by Each Nare route daily as needed.     Begin Date    AM    Noon     PM    Bedtime       pravastatin 40 MG tablet   Commonly known as:  PRAVACHOL   Refills:  0   Dose:  40 mg    Instructions:  Take 40 mg by mouth nightly.     Begin Date    AM    Noon    PM    Bedtime       SITagliptan-metformin 50-1,000 mg per tablet   Commonly known as:  JANUMET   Refills:  0   Dose:  1 tablet    Instructions:  Take 1 tablet by mouth 2 (two) times daily with meals.     Begin Date    AM    Noon    PM    Bedtime       zolpidem 10 mg Tab   Commonly known as:  AMBIEN   Refills:  0   Dose:  5 mg    Instructions:  Take 5 mg by mouth nightly as needed.     Begin Date    AM    Noon    PM    Bedtime            Where to Get Your Medications      These medications were sent to [facility]/pharmacy #[id number] - [place], [place] - [address] AT [facility]  [address], [place] [address]     Phone:  [phone]     cephALEXin 500 MG capsule    oxybutynin 5 MG Tr24    tramadol-acetaminophen 37.5-325 mg 37.5-325 mg Tab                  Please bring to all follow up appointments:    1. A copy of your discharge instructions.  2. All medicines you are currently taking in their original bottles.  3. Identification and insurance card.    Please arrive 15 minutes ahead of scheduled appointment time.    Please call 24 hours in advance if you must reschedule your appointment and/or time.        Your Scheduled Appointments     Apr 20, 2017  8:30 AM CDT   Nurse Visit with UROLOGY, NURSE BEN BENSON - Urology ([facility])    [address]  [place] [address]   [phone]            May 03, 2017  9:30 AM CDT   Post OP with MD [place] Tavares - Urology ([facility])    [address]  [place] [address]   [phone]              Follow-up Information     Follow up with Dave Arevalo MD In 2 days.    Specialty:  Urology    Why:  for catheter removal    Contact information:    [address]  [place]  [address]  [phone]          Discharge Instructions     Future Orders    Activity as tolerated     Diet general     Questions:    Total calories:      Fat restriction, if any:      Protein restriction, if any:      Na restriction, if any:      Fluid restriction:      Additional restrictions:          Discharge Instructions         Discharge Instructions for Laser Prostatectomy  You had a procedure called laser prostatectomy. During this surgical procedure, all or part of your prostate gland was removed to relieve urinary problems due to prostate enlargement. Here are some instructions to help you care for yourself once you have returned home.  Activity  · Limit physical activity for the first week after surgery. This will allow your body time to rest and heal.  · Ask your healthcare provider before resuming your normal activity level.  · Avoid long car rides. Dont drive until your healthcare provider says its OK. This is usually after your catheter is removed and you are no longer taking pain medication.  · Dont lift anything heavier than 10 pounds until your health care provider says its OK.  · Avoid climbing stairs and strenuous exercise. Also, do not do chores, such as mowing the lawn or vacuuming, until your healthcare provider says its OK.  Other home care  · Finish all of the antibiotics your health care provider prescribed to you, even if you feel better. Antibiotics help keep you from getting an infection.  · Eat high-fiber foods to avoid constipation. Also, use laxatives, stool softeners, or enemas as directed by your healthcare provider.  · You should receive an information sheet about caring for your urinary catheter. Ask for a sheet if you did not receive one. Follow the sheets instructions, some of which should include:  ¨ Keep the catheter well secured to the leg or abdomen.  ¨ Use leg bags, external (straight drainage) bags, or both.  ¨ Empty the bag when its half full. You may see some blood  in the bag. Dont be alarmed. This is normal after surgery and while the catheter is in place.  ¨ Use plain soap and water to wash the head of your penis daily, or more often if needed.  ¨ Wash the catheter daily with plain soap and water to avoid infection.  · Return to your normal diet.  · Drink plenty of fluids during the day (enough to keep your urine light colored). This will help keep urine flowing freely.  · Shower as usual.  · Wear loose-fitting sweatpants while the catheter is in place. Sweatpants are more comfortable than other pant styles.   Follow-up  Make a follow-up appointment as directed by your healthcare provider.     When to seek medical help  Call 911 right away if you have:  · Shortness of breath.  Otherwise, call your healthcare provider right away if you have:  · Fever of 100.4°F (38°C) or higher, or shaking chills  · Hives or skin rash  · Nausea, vomiting, or diarrhea  · Catheter falls out or stops draining  · Foul-smelling discharge from your catheter or urethra (at tip of penis)   Date Last Reviewed: 9/24/2014  © 9343-2541 [employer]. [address] [place] [address]. All rights reserved. This information is not intended as a substitute for professional medical care. Always follow your healthcare professional's instructions.      General Information:    1.  Do not drink alcoholic beverages including beer for 24 hours or as long as you are on pain medication..  2.  Do not drive a motor vehicle, operate machinery or power tools, or signs legal papers for 24 hours or as long as you are on pain medication.   3.  You may experience light-headedness, dizziness, and sleepiness following surgery. Please do not stay alone. A responsible adult should be with you for this 24 hour period.  4.  Go home and rest.    5. Progress slowly to a normal diet unless instructed.  Otherwise, begin with liquids such as soft drinks, then soup and crackers working up to solid foods. Drink  "plenty of nonalcoholic fluids.  6.  Certain anesthetics and pain medications produce nausea and vomiting in certain       individuals. If nausea becomes a problem at home, call you doctor.    7. A nurse will be calling you sometime after surgery. Do not be alarmed. This is our way of finding out how you are doing.    8. Several times every hour while you are awake, take 2-3 deep breaths and cough. If you had stomach surgery hold a pillow or rolled towel firmly against your stomach before you cough. This will help with any pain the cough might cause.  9. Several times every hour while you are awake, pump and flex your feet 5-6 times and do foot circles. This will help prevent blood clots.    10.Call your doctor for severe pain, bleeding, fever, or signs or symptoms of infection (pain, swelling, redness, foul odor, drainage).    Discharge Instructions: After Your Surgery/Procedure  Youve just had surgery. During surgery you were given medicine called anesthesia to keep you relaxed and free of pain. After surgery you may have some pain or nausea. This is common. Here are some tips for feeling better and getting well after surgery.     Stay on schedule with your medication.   Going home  Your doctor or nurse will show you how to take care of yourself when you go home. He or she will also answer your questions. Have an adult family member or friend drive you home.      For your safety we recommend these precaution for the first 24 hours after your procedure:  · Do not drive or use heavy equipment.  · Do not make important decisions or sign legal papers.  · Do not drink alcohol.  · Have someone stay with you, if needed. He or she can watch for problems and help keep you safe.  · Your concentration, balance, coordination, and judgement may be impaired for many hours after anesthesia.  Use caution when ambulating or standing up.     · You may feel weak and "washed out" after anesthesia and surgery.      Subtle residual " effects of general anesthesia or sedation with regional / local anesthesia can last more than 24 hours.  Rest for the remainder of the day or longer if your Doctor/Surgeon has advised you to do so.  Although you may feel normal within the first 24 hours, your reflexes and mental ability may be impaired without you realizing it.  You may feel dizzy, lightheaded or sleepy for 24 hours or longer.      Be sure to go to all follow-up visits with your doctor. And rest after your surgery for as long as your doctor tells you to.  Coping with pain  If you have pain after surgery, pain medicine will help you feel better. Take it as told, before pain becomes severe. Also, ask your doctor or pharmacist about other ways to control pain. This might be with heat, ice, or relaxation. And follow any other instructions your surgeon or nurse gives you.  Tips for taking pain medicine  To get the best relief possible, remember these points:  · Pain medicines can upset your stomach. Taking them with a little food may help.  · Most pain relievers taken by mouth need at least 20 to 30 minutes to start to work.  · Taking medicine on a schedule can help you remember to take it. Try to time your medicine so that you can take it before starting an activity. This might be before you get dressed, go for a walk, or sit down for dinner.  · Constipation is a common side effect of pain medicines. Call your doctor before taking any medicines such as laxatives or stool softeners to help ease constipation. Also ask if you should skip any foods. Drinking lots of fluids and eating foods such as fruits and vegetables that are high in fiber can also help. Remember, do not take laxatives unless your surgeon has prescribed them.  · Drinking alcohol and taking pain medicine can cause dizziness and slow your breathing. It can even be deadly. Do not drink alcohol while taking pain medicine.  · Pain medicine can make you react more slowly to things. Do not drive  or run machinery while taking pain medicine.  Your health care provider may tell you to take acetaminophen to help ease your pain. Ask him or her how much you are supposed to take each day. Acetaminophen or other pain relievers may interact with your prescription medicines or other over-the-counter (OTC) drugs. Some prescription medicines have acetaminophen and other ingredients. Using both prescription and OTC acetaminophen for pain can cause you to overdose. Read the labels on your OTC medicines with care. This will help you to clearly know the list of ingredients, how much to take, and any warnings. It may also help you not take too much acetaminophen. If you have questions or do not understand the information, ask your pharmacist or health care provider to explain it to you before you take the OTC medicine.  Managing nausea  Some people have an upset stomach after surgery. This is often because of anesthesia, pain, or pain medicine, or the stress of surgery. These tips will help you handle nausea and eat healthy foods as you get better. If you were on a special food plan before surgery, ask your doctor if you should follow it while you get better. These tips may help:  · Do not push yourself to eat. Your body will tell you when to eat and how much.  · Start off with clear liquids and soup. They are easier to digest.  · Next try semi-solid foods, such as mashed potatoes, applesauce, and gelatin, as you feel ready.  · Slowly move to solid foods. Dont eat fatty, rich, or spicy foods at first.  · Do not force yourself to have 3 large meals a day. Instead eat smaller amounts more often.  · Take pain medicines with a small amount of solid food, such as crackers or toast, to avoid nausea.     Call your surgeon if  · You still have pain an hour after taking medicine. The medicine may not be strong enough.  · You feel too sleepy, dizzy, or groggy. The medicine may be too strong.  · You have side effects like nausea,  "vomiting, or skin changes, such as rash, itching, or hives.       If you have obstructive sleep apnea  You were given anesthesia medicine during surgery to keep you comfortable and free of pain. After surgery, you may have more apnea spells because of this medicine and other medicines you were given. The spells may last longer than usual.   At home:  · Keep using the continuous positive airway pressure (CPAP) device when you sleep. Unless your health care provider tells you not to, use it when you sleep, day or night. CPAP is a common device used to treat obstructive sleep apnea.  · Talk with your provider before taking any pain medicine, muscle relaxants, or sedatives. Your provider will tell you about the possible dangers of taking these medicines.  © 0749-1461 [employer]. [address] [place] [address]. All rights reserved. This information is not intended as a substitute for professional medical care. Always follow your healthcare professional's instructions.          Primary Diagnosis     Your primary diagnosis was:  Enlarged Prostate With Urinary Obstruction      Admission Information     Date & Time Provider Department CSN    4/18/2017  7:12 AM Dave Arevalo MD [facility] [id number]      Care Providers     Provider Role Specialty Primary office phone    Dave Arevalo MD Attending Provider Urology [phone]    Dave Arevalo MD Surgeon  Urology [phone]      Your Vitals Were     BP Pulse Temp Resp Height Weight    110/55 62 97.9 °F (36.6 °C) (Oral) 25 5' 10" (1.778 m) 104.3 kg (230 lb)    SpO2 BMI             95% 33 kg/m2         Recent Lab Values     No lab values to display.      Allergies as of 4/18/2017     No Known Allergies      Advance Directives     An advance directive is a document which, in the event you are no longer able to make decisions for yourself, tells your healthcare team what kind of treatment you do or do not want to " receive, or who you would like to make those decisions for you.  If you do not currently have an advance directive, [facility] encourages you to create one.  For more information call:  [phone]), [phone] ([phone]),  or log on to [email or web address].        Smoking Cessation     If you would like to quit smoking:   You may be eligible for free services if you are a [place] resident and started smoking cigarettes before September 1, 1988.  Call the Smoking Cessation Trust (SCT) toll free at (201) 688-8067 or (230) 724-2380.   Call 9-474-QUIT-NOW if you do not meet the above criteria.   Contact us via email: [email or web address]   View our website for more information: [email or web address]        Language Assistance Services     ATTENTION: Language assistance services are available, free of charge. Please call [phone].      ATENCIÓN: Si habla español, tiene a gilmore disposición servicios gratuitos de asistencia lingüística. Llame al [phone].     CHÚ Ý: N?u b?n nói Ti?ng Vi?t, có các d?ch v? h? tr? ngôn ng? mi?n phí dành cho b?n. G?i s? [phone].         [facility] complies with applicable Federal civil rights laws and does not discriminate on the basis of race, color, national origin, age, disability, or sex.

## 2017-04-18 NOTE — ANESTHESIA PREPROCEDURE EVALUATION
04/18/2017  Mauri Archer Jr. is a 71 y.o., male.    Anesthesia Evaluation    I have reviewed the Patient Summary Reports.    I have reviewed the Nursing Notes.   I have reviewed the Medications.     Review of Systems  Anesthesia Hx:  No problems with previous Anesthesia    Social:  Former Smoker    Cardiovascular:   Hypertension, poorly controlled    Pulmonary:   Sleep Apnea, CPAP    Renal/:   Chronic Renal Disease BPH    Endocrine:   Diabetes, type 2, using insulin Hypothyroidism        Physical Exam  General:  Obesity            Mental Status:  Mental Status Findings:  Alert and Oriented, Cooperative         Anesthesia Plan  Type of Anesthesia, risks & benefits discussed:  Anesthesia Type:  general  Patient's Preference:   Intra-op Monitoring Plan:   Intra-op Monitoring Plan Comments:   Post Op Pain Control Plan:   Post Op Pain Control Plan Comments:   Induction:   IV  Beta Blocker:  Patient is not currently on a Beta-Blocker (No further documentation required).       Informed Consent: Patient understands risks and agrees with Anesthesia plan.  Questions answered. Anesthesia consent signed with patient.  ASA Score: 4     Day of Surgery Review of History & Physical: I have interviewed and examined the patient. I have reviewed the patient's H&P dated:  There are no significant changes.          Ready For Surgery From Anesthesia Perspective.

## 2017-04-18 NOTE — ANESTHESIA POSTPROCEDURE EVALUATION
"Anesthesia Post Evaluation    Patient: Mauri Archer Jr.    Procedure(s) Performed: Procedure(s) (LRB):  PROSTATECTOMY-TRANSURETHRAL WITH GREENLIGHT LASER (N/A)    Final Anesthesia Type: general  Patient location during evaluation: PACU  Patient participation: Yes- Able to Participate  Level of consciousness: awake and alert and oriented  Post-procedure vital signs: reviewed and stable  Pain management: adequate  Airway patency: patent  PONV status at discharge: No PONV  Anesthetic complications: no      Cardiovascular status: blood pressure returned to baseline  Respiratory status: unassisted, spontaneous ventilation and room air  Hydration status: euvolemic  Follow-up not needed.        Visit Vitals    /66    Pulse 66    Temp 36.6 °C (97.9 °F) (Oral)    Resp (!) 21    Ht 5' 10" (1.778 m)    Wt 104.3 kg (230 lb)    SpO2 (!) 94%    BMI 33 kg/m2       Pain/Daryn Score: Pain Assessment Performed: Yes (4/18/2017 12:00 PM)  Presence of Pain: complains of pain/discomfort (4/18/2017 12:00 PM)  Pain Rating Prior to Med Admin: 3 (burning and uncomfortable) (4/18/2017 11:56 AM)  Daryn Score: 9 (4/18/2017 12:00 PM)      "

## 2017-04-19 VITALS
HEART RATE: 68 BPM | DIASTOLIC BLOOD PRESSURE: 71 MMHG | BODY MASS INDEX: 32.93 KG/M2 | OXYGEN SATURATION: 93 % | SYSTOLIC BLOOD PRESSURE: 128 MMHG | WEIGHT: 230 LBS | RESPIRATION RATE: 21 BRPM | TEMPERATURE: 98 F | HEIGHT: 70 IN

## 2017-04-20 ENCOUNTER — CLINICAL SUPPORT (OUTPATIENT)
Dept: UROLOGY | Facility: CLINIC | Age: 72
End: 2017-04-20
Payer: MEDICARE

## 2017-04-20 NOTE — PROGRESS NOTES
Patient arrived for catheter removal, 400 ml yellow urine in leg bag, deflated 30 ml NS balloon, removed 22 fr garcia without difficulty, patient tolerated well, discharge teaching done with RTC for no urination by 3 pm, patient verbally understood.

## 2017-04-28 DIAGNOSIS — R35.0 URINARY FREQUENCY: ICD-10-CM

## 2017-05-01 RX ORDER — TAMSULOSIN HYDROCHLORIDE 0.4 MG/1
CAPSULE ORAL
Qty: 90 CAPSULE | Refills: 3 | Status: SHIPPED | OUTPATIENT
Start: 2017-05-01 | End: 2017-12-04

## 2017-05-03 ENCOUNTER — OFFICE VISIT (OUTPATIENT)
Dept: UROLOGY | Facility: CLINIC | Age: 72
End: 2017-05-03
Payer: MEDICARE

## 2017-05-03 VITALS
BODY MASS INDEX: 34.07 KG/M2 | DIASTOLIC BLOOD PRESSURE: 77 MMHG | TEMPERATURE: 98 F | WEIGHT: 238 LBS | HEART RATE: 77 BPM | SYSTOLIC BLOOD PRESSURE: 128 MMHG | HEIGHT: 70 IN

## 2017-05-03 DIAGNOSIS — N13.8 BPH WITH OBSTRUCTION/LOWER URINARY TRACT SYMPTOMS: Primary | ICD-10-CM

## 2017-05-03 DIAGNOSIS — N40.1 BPH WITH OBSTRUCTION/LOWER URINARY TRACT SYMPTOMS: Primary | ICD-10-CM

## 2017-05-03 LAB
BILIRUB SERPL-MCNC: ABNORMAL MG/DL
BLOOD URINE, POC: 250
COLOR, POC UA: ABNORMAL
GLUCOSE UR QL STRIP: ABNORMAL
KETONES UR QL STRIP: ABNORMAL
LEUKOCYTE ESTERASE URINE, POC: ABNORMAL
NITRITE, POC UA: ABNORMAL
PH, POC UA: 5
PROTEIN, POC: ABNORMAL
SPECIFIC GRAVITY, POC UA: 1.02
UROBILINOGEN, POC UA: ABNORMAL

## 2017-05-03 PROCEDURE — 99213 OFFICE O/P EST LOW 20 MIN: CPT | Mod: PBBFAC,PO | Performed by: UROLOGY

## 2017-05-03 PROCEDURE — 81002 URINALYSIS NONAUTO W/O SCOPE: CPT | Mod: PBBFAC,PO | Performed by: UROLOGY

## 2017-05-03 PROCEDURE — 99999 PR PBB SHADOW E&M-EST. PATIENT-LVL III: CPT | Mod: PBBFAC,,, | Performed by: UROLOGY

## 2017-05-03 PROCEDURE — 99024 POSTOP FOLLOW-UP VISIT: CPT | Mod: S$PBB,,, | Performed by: UROLOGY

## 2017-05-03 RX ORDER — LOSARTAN POTASSIUM 50 MG/1
50 TABLET ORAL DAILY
Refills: 2 | COMMUNITY
Start: 2017-04-18 | End: 2021-08-21

## 2017-05-03 NOTE — MR AVS SNAPSHOT
[facility] [place] [facility]  [address]  [place] [address]  Phone: [phone]                  Mauri Archer Jr.   5/3/2017 9:30 AM   Office Visit    Description:  Male : 1945   Provider:  Dave Arevalo MD   Department:  [facility]           Reason for Visit     Post-op Evaluation     Dysuria                To Do List           Future Appointments        Provider Department Dept Phone    11/3/2017 9:30 AM MD [facility] [phone]      Goals (5 Years of Data)     None      [facility]     [facility] On Call Nurse Care Line -  Assistance  Unless otherwise directed by your provider, please contact [facility], our nurse care line that is available for  assistance.     Registered nurses in the [facility] On Call Center provide: appointment scheduling, clinical advisement, health education, and other advisory services.  Call: [phone] (toll free)               Medications           Message regarding Medications     Verify the changes and/or additions to your medication regime listed below are the same as discussed with your clinician today.  If any of these changes or additions are incorrect, please notify your healthcare provider.        STOP taking these medications     tramadol-acetaminophen 37.5-325 mg (ULTRACET) 37.5-325 mg Tab Take 1 tablet by mouth every 6 (six) hours as needed.           Verify that the below list of medications is an accurate representation of the medications you are currently taking.  If none reported, the list may be blank. If incorrect, please contact your healthcare provider. Carry this list with you in case of emergency.           Current Medications     allopurinol (ZYLOPRIM) 300 MG tablet Take 300 mg by mouth once daily.    aspirin (ECOTRIN) 81 MG EC tablet Take 81 mg by mouth once daily.    calcium citrate-vitamin D3 315-200 mg (CITRACAL+D) 315-200 mg-unit per tablet Take 1 tablet by  "mouth nightly.     cetirizine (ZYRTEC) 10 MG tablet Take 10 mg by mouth daily as needed for Allergies.    ciclesonide (OMNARIS) 50 mcg Spry 2 sprays by Each Nare route daily as needed.    empagliflozin (JARDIANCE) 10 mg Tab Take 1 tablet by mouth once daily.    FREESTYLE LITE STRIPS Strp     glimepiride (AMARYL) 4 MG tablet Take 4 mg by mouth 2 (two) times daily.     insulin detemir (LEVEMIR) 100 unit/mL injection Inject 36 Units into the skin every evening.     levmefolate-B6 phos-methyl-B12 (METANX) 3-35-2 mg Tab Take 1 tablet by mouth once daily.    levothyroxine (SYNTHROID) 112 MCG tablet Take 112 mcg by mouth once daily. 2 TABS    loratadine (CLARITIN) 10 mg tablet Take 10 mg by mouth daily as needed for Allergies.    losartan (COZAAR) 50 MG tablet Take 50 mg by mouth once daily.    omega-3 acid ethyl esters (LOVAZA) 1 gram capsule Take 2 g by mouth 2 (two) times daily. 1 am and 2 at night    oxybutynin (DITROPAN XL) 5 MG TR24 Take 1 tablet (5 mg total) by mouth once daily.    pravastatin (PRAVACHOL) 40 MG tablet Take 40 mg by mouth nightly.     sitagliptan-metformin (JANUMET) 50-1,000 mg per tablet Take 1 tablet by mouth 2 (two) times daily with meals.    tamsulosin (FLOMAX) 0.4 mg Cp24 TAKE 1 CAPSULE ONCE DAILY    zolpidem (AMBIEN) 10 mg Tab Take 5 mg by mouth nightly as needed.           Clinical Reference Information           Your Vitals Were     BP Pulse Temp Height Weight BMI    128/77 (BP Location: Left arm, Patient Position: Sitting, BP Method: Automatic) 77 97.6 °F (36.4 °C) (Oral) 5' 10" (1.778 m) 108 kg (238 lb) 34.15 kg/m2      Blood Pressure          Most Recent Value    BP  128/77      Allergies as of 5/3/2017     No Known Allergies      Immunizations Administered on Date of Encounter - 5/3/2017     None      Language Assistance Services     ATTENTION: Language assistance services are available, free of charge. Please call [phone].      ATENCIÓN: se Camejo a gilmore disposición " servicios gratuitos de asistencia lingüística. Amaris howard [phone].     FRANK Ý: N?u b?n nói Ti?ng Vi?t, có các d?ch v? h? tr? ngôn ng? mi?n phí dành cho b?n. G?i s? [phone].         [facility] complies with applicable Federal civil rights laws and does not discriminate on the basis of race, color, national origin, age, disability, or sex.

## 2017-05-03 NOTE — PROGRESS NOTES
OFFICE NOTE    CHIEF COMPLAINT:  Postoperative evaluation after laser vaporization of the   prostate on 04/18/2017.    Mr. Archer is a 71-year-old male who underwent a laser vaporization of the   prostate for symptomatic BPH.  The patient refers that he is doing very well,   have only initial dysuria, very slight, good flow, no nocturia and no hematuria.    The patient is very satisfied with the result of the therapy.  The patient   refers that in occasions, he sees a slight blood at the beginning of the   urination.    I explained to the patient that he will have an occasional hematuria for the   next couple of months and that is expected.  I also suggested that we check him   up in approximately six months, and at that point, we will go ahead and order a   PSA test.  All the questions were answered at his satisfaction and he left the   office in satisfactory condition.      REJI/RUDDY  dd: 05/03/2017 10:49:34 (CDT)  td: 05/03/2017 19:11:41 (CDT)  Doc ID   #[id number]  Job ID #[id number]    CC:

## 2017-12-04 ENCOUNTER — OFFICE VISIT (OUTPATIENT)
Dept: UROLOGY | Facility: CLINIC | Age: 72
End: 2017-12-04
Payer: MEDICARE

## 2017-12-04 VITALS
DIASTOLIC BLOOD PRESSURE: 81 MMHG | HEIGHT: 70 IN | BODY MASS INDEX: 33.07 KG/M2 | HEART RATE: 77 BPM | WEIGHT: 231 LBS | TEMPERATURE: 98 F | SYSTOLIC BLOOD PRESSURE: 130 MMHG

## 2017-12-04 DIAGNOSIS — N40.1 BENIGN PROSTATIC HYPERPLASIA WITH INCOMPLETE BLADDER EMPTYING: Primary | ICD-10-CM

## 2017-12-04 DIAGNOSIS — R39.14 BENIGN PROSTATIC HYPERPLASIA WITH INCOMPLETE BLADDER EMPTYING: Primary | ICD-10-CM

## 2017-12-04 LAB
BILIRUB SERPL-MCNC: ABNORMAL MG/DL
BLOOD URINE, POC: ABNORMAL
COLOR, POC UA: ABNORMAL
GLUCOSE UR QL STRIP: ABNORMAL
KETONES UR QL STRIP: ABNORMAL
LEUKOCYTE ESTERASE URINE, POC: ABNORMAL
NITRITE, POC UA: ABNORMAL
PH, POC UA: 5
PROTEIN, POC: ABNORMAL
SPECIFIC GRAVITY, POC UA: 1.01
UROBILINOGEN, POC UA: ABNORMAL

## 2017-12-04 PROCEDURE — 81002 URINALYSIS NONAUTO W/O SCOPE: CPT | Mod: PBBFAC,PN | Performed by: UROLOGY

## 2017-12-04 PROCEDURE — 99999 PR PBB SHADOW E&M-EST. PATIENT-LVL III: CPT | Mod: PBBFAC,,, | Performed by: UROLOGY

## 2017-12-04 PROCEDURE — 99213 OFFICE O/P EST LOW 20 MIN: CPT | Mod: PBBFAC,PN | Performed by: UROLOGY

## 2017-12-04 PROCEDURE — 99213 OFFICE O/P EST LOW 20 MIN: CPT | Mod: S$PBB,,, | Performed by: UROLOGY

## 2017-12-04 RX ORDER — TADALAFIL 5 MG/1
5 TABLET ORAL DAILY
Qty: 90 TABLET | Refills: 3 | Status: SHIPPED | OUTPATIENT
Start: 2017-12-04 | End: 2023-11-06 | Stop reason: CLARIF

## 2017-12-04 NOTE — PROGRESS NOTES
Subjective:       Patient ID: Mauri Archer Jr. is a 72 y.o. male.    Chief Complaint:   OFFICE NOTE    CHIEF COMPLAINT:  BPH.    HISTORY OF PRESENT ILLNESS:  Mr. Archer is a 72-year-old male who underwent a   laser vaporization of the prostate on 04/18/2017.  The patient since then has   been doing much better.  The patient at the present time, has no nocturia, no   dysuria or hematuria.  The flow is steady and strong.  The patient has mild   terminal dribbling that he is managing more or less well.  The patient is   satisfied with the result of the procedure.    It is to be noted that on 06/2016, his PSA was 2.8.  In November 2017, is 3.1.    The urinalysis today shows 4+ glucose, otherwise is negative.  I suggested to   the patient that we should probably proceed and takes Cialis in daily doses if   he can do it and that will hopefully further help in his urination.    The past medical history, the past surgical history, the current medications,   and the allergies are well documented in the medical record and they were   reviewed by me during this visit.      EOR/HN  dd: 12/04/2017 16:18:30 (CST)  td: 12/05/2017 03:22:59 (CST)  Doc ID   #[id number]  Job ID #[id number]    CC:       HPI  Review of Systems   Constitutional: Negative for activity change and appetite change.   HENT: Negative.    Eyes: Negative for discharge.   Respiratory: Negative for cough and shortness of breath.    Cardiovascular: Negative for chest pain and palpitations.   Gastrointestinal: Negative for abdominal distention, abdominal pain, constipation and vomiting.   Genitourinary: Negative for discharge, dysuria, flank pain, frequency, hematuria, testicular pain and urgency.   Musculoskeletal: Negative for arthralgias.   Skin: Negative for rash.   Neurological: Negative for dizziness.   Psychiatric/Behavioral: The patient is not nervous/anxious.        Objective:      Physical Exam   Constitutional: He appears well-developed and  well-nourished.   HENT:   Head: Normocephalic.   Eyes: Pupils are equal, round, and reactive to light.   Neck: Normal range of motion.   Cardiovascular: Normal rate.    Pulmonary/Chest: Effort normal.   Abdominal: Soft. He exhibits no distension and no mass. There is no tenderness.   Genitourinary: Rectum normal. Rectal exam shows no external hemorrhoid, no mass and no tenderness. Prostate is not enlarged and not tender.   Musculoskeletal: Normal range of motion.   Neurological: He is alert.   Skin: Skin is warm.     Psychiatric: He has a normal mood and affect.       Assessment:       1. Benign prostatic hyperplasia with incomplete bladder emptying        Plan:       Benign prostatic hyperplasia with incomplete bladder emptying  -     POCT URINE DIPSTICK WITHOUT MICROSCOPE    Other orders  -     tadalafil (CIALIS) 5 MG tablet; Take 1 tablet (5 mg total) by mouth once daily.  Dispense: 90 tablet; Refill: 3    RTC 1 yr.

## 2017-12-07 ENCOUNTER — TELEPHONE (OUTPATIENT)
Dept: UROLOGY | Facility: CLINIC | Age: 72
End: 2017-12-07

## 2017-12-07 NOTE — TELEPHONE ENCOUNTER
Left message for patient to call back.    Tried to get PA for Cialis.    Was told that it is not covered.  No PA can be done.

## 2018-01-15 ENCOUNTER — TELEPHONE (OUTPATIENT)
Dept: UROLOGY | Facility: CLINIC | Age: 73
End: 2018-01-15

## 2018-01-15 NOTE — TELEPHONE ENCOUNTER
----- Message from Daphne Zamudio sent at 1/15/2018 10:18 AM CST -----  Contact: [phone]  Patient is requesting a call back from the nurse, didn't want to leave details.    Please call the patient upon request at phone number [phone].

## 2018-01-25 ENCOUNTER — TELEPHONE (OUTPATIENT)
Dept: UROLOGY | Facility: CLINIC | Age: 73
End: 2018-01-25

## 2018-01-25 NOTE — TELEPHONE ENCOUNTER
Patient has set up an account with [facility].  Acct: [id number]    Will need a new Rx for Cialis 5mg 90 day supply.

## 2018-01-25 NOTE — TELEPHONE ENCOUNTER
----- Message from Aurora Gilbert sent at 1/25/2018  9:16 AM CST -----  Contact: self   Patient want to speak with a nurse regarding rx returning call please call back at [phone]

## 2019-01-16 ENCOUNTER — TELEPHONE (OUTPATIENT)
Dept: UROLOGY | Facility: CLINIC | Age: 74
End: 2019-01-16

## 2019-01-22 ENCOUNTER — TELEPHONE (OUTPATIENT)
Dept: UROLOGY | Facility: CLINIC | Age: 74
End: 2019-01-22

## 2019-01-22 NOTE — TELEPHONE ENCOUNTER
Need to shai pt's appointment 1/24/19 due to dr. Arevalo not being in clinic. Left voicemail to return call

## 2019-01-24 DIAGNOSIS — N13.8 BPH WITH OBSTRUCTION/LOWER URINARY TRACT SYMPTOMS: Primary | ICD-10-CM

## 2019-01-24 DIAGNOSIS — N40.1 BPH WITH OBSTRUCTION/LOWER URINARY TRACT SYMPTOMS: Primary | ICD-10-CM

## 2019-02-07 ENCOUNTER — LAB VISIT (OUTPATIENT)
Dept: LAB | Facility: HOSPITAL | Age: 74
End: 2019-02-07
Attending: UROLOGY
Payer: MEDICARE

## 2019-02-07 ENCOUNTER — OFFICE VISIT (OUTPATIENT)
Dept: UROLOGY | Facility: CLINIC | Age: 74
End: 2019-02-07
Payer: MEDICARE

## 2019-02-07 VITALS
DIASTOLIC BLOOD PRESSURE: 85 MMHG | TEMPERATURE: 98 F | SYSTOLIC BLOOD PRESSURE: 138 MMHG | BODY MASS INDEX: 33.07 KG/M2 | HEART RATE: 80 BPM | WEIGHT: 231 LBS | HEIGHT: 70 IN

## 2019-02-07 DIAGNOSIS — N13.8 BENIGN PROSTATIC HYPERPLASIA WITH URINARY OBSTRUCTION: Primary | ICD-10-CM

## 2019-02-07 DIAGNOSIS — N40.1 BPH WITH OBSTRUCTION/LOWER URINARY TRACT SYMPTOMS: ICD-10-CM

## 2019-02-07 DIAGNOSIS — N40.1 BENIGN PROSTATIC HYPERPLASIA WITH URINARY OBSTRUCTION: Primary | ICD-10-CM

## 2019-02-07 DIAGNOSIS — N13.8 BPH WITH OBSTRUCTION/LOWER URINARY TRACT SYMPTOMS: ICD-10-CM

## 2019-02-07 LAB
BILIRUB SERPL-MCNC: NEGATIVE MG/DL
BLOOD URINE, POC: NEGATIVE
COLOR, POC UA: ABNORMAL
COMPLEXED PSA SERPL-MCNC: 2.7 NG/ML
GLUCOSE UR QL STRIP: 500
KETONES UR QL STRIP: NEGATIVE
LEUKOCYTE ESTERASE URINE, POC: NEGATIVE
NITRITE, POC UA: NEGATIVE
PH, POC UA: 5
PROTEIN, POC: NEGATIVE
SPECIFIC GRAVITY, POC UA: 1010
UROBILINOGEN, POC UA: NEGATIVE

## 2019-02-07 PROCEDURE — 99999 PR PBB SHADOW E&M-EST. PATIENT-LVL III: ICD-10-PCS | Mod: PBBFAC,,, | Performed by: UROLOGY

## 2019-02-07 PROCEDURE — 1101F PR PT FALLS ASSESS DOC 0-1 FALLS W/OUT INJ PAST YR: ICD-10-PCS | Mod: CPTII,S$GLB,, | Performed by: UROLOGY

## 2019-02-07 PROCEDURE — 99999 PR PBB SHADOW E&M-EST. PATIENT-LVL III: CPT | Mod: PBBFAC,,, | Performed by: UROLOGY

## 2019-02-07 PROCEDURE — 36415 COLL VENOUS BLD VENIPUNCTURE: CPT

## 2019-02-07 PROCEDURE — 84153 ASSAY OF PSA TOTAL: CPT

## 2019-02-07 PROCEDURE — 81002 POCT URINE DIPSTICK WITHOUT MICROSCOPE: ICD-10-PCS | Mod: S$GLB,,, | Performed by: UROLOGY

## 2019-02-07 PROCEDURE — 99214 OFFICE O/P EST MOD 30 MIN: CPT | Mod: 25,S$GLB,, | Performed by: UROLOGY

## 2019-02-07 PROCEDURE — 81002 URINALYSIS NONAUTO W/O SCOPE: CPT | Mod: S$GLB,,, | Performed by: UROLOGY

## 2019-02-07 PROCEDURE — 1101F PT FALLS ASSESS-DOCD LE1/YR: CPT | Mod: CPTII,S$GLB,, | Performed by: UROLOGY

## 2019-02-07 PROCEDURE — 99214 PR OFFICE/OUTPT VISIT, EST, LEVL IV, 30-39 MIN: ICD-10-PCS | Mod: 25,S$GLB,, | Performed by: UROLOGY

## 2019-02-07 RX ORDER — DAPAGLIFLOZIN 5 MG/1
5 TABLET, FILM COATED ORAL DAILY
COMMUNITY
End: 2021-08-21

## 2019-02-07 NOTE — PROGRESS NOTES
Subjective:       Patient ID: Mauri Arcehr Jr. is a 73 y.o. male.    Chief Complaint:   DATE OF VISIT:  02/07/2019.    CHIEF COMPLAINT:  BPH.    Mr. Archer is a 73-year-old male who underwent a laser vaporization of the  prostate on April 2017.  Since then, the patient has been doing much better  with no significant lower urinary tract symptoms and here he is for his  annual prostate evaluation.  He refers to have nocturia x1.  No dysuria.   No hematuria.  The flow is adequate and he feels that he empties the  bladder satisfactory.    FAMILY HISTORY:  Negative for prostate cancer.    PAST MEDICAL AND SURGICAL HISTORY:  Have changed somewhat.  He underwent a  sinus operation and from that he also recuperated very satisfactory.    The last PSA was performed in 2017 3.1.  The urinalysis today shows clear  urine with the presence of glucose.  This is a longstanding diabetic that  is insulin dependent.  The patient is very satisfied in the way that he is  voiding.        EOR/HN dd: 02/07/2019 13:01:37 (CST)   td: 02/07/2019 18:43:21 (CST)  Doc ID #[id number]   Job ID #[id number]    CC:            HPI  Review of Systems   Constitutional: Negative for activity change and appetite change.   HENT: Negative.    Eyes: Negative for discharge.   Respiratory: Negative for cough and shortness of breath.    Cardiovascular: Negative for chest pain and palpitations.   Gastrointestinal: Negative for abdominal distention, abdominal pain, constipation and vomiting.   Genitourinary: Negative for discharge, dysuria, flank pain, frequency, hematuria, testicular pain and urgency.   Musculoskeletal: Negative for arthralgias.   Skin: Negative for rash.   Neurological: Negative for dizziness.   Psychiatric/Behavioral: The patient is not nervous/anxious.        Objective:      Physical Exam   Constitutional: He appears well-developed and well-nourished.   HENT:   Head: Normocephalic.   Eyes: Pupils are equal, round, and reactive to light.    Neck: Normal range of motion.   Cardiovascular: Normal rate.    Pulmonary/Chest: Effort normal.   Abdominal: Soft. He exhibits no distension and no mass. There is no tenderness.   Genitourinary: Rectum normal, prostate normal and penis normal. Rectal exam shows no external hemorrhoid, no mass and no tenderness. Prostate is not enlarged and not tender. Right testis shows no mass and no tenderness. Left testis shows no mass and no tenderness. No discharge found.   Musculoskeletal: Normal range of motion.   Neurological: He is alert.   Skin: Skin is warm.     Psychiatric: He has a normal mood and affect.       Assessment:       1. Benign prostatic hyperplasia with urinary obstruction    2. BPH with obstruction/lower urinary tract symptoms        Plan:       Benign prostatic hyperplasia with urinary obstruction  -     POCT URINE DIPSTICK WITHOUT MICROSCOPE    BPH with obstruction/lower urinary tract symptoms    PSA to be done today. He is to be informed of results. RTC 1 yr.

## 2019-07-01 ENCOUNTER — OCCUPATIONAL HEALTH (OUTPATIENT)
Dept: URGENT CARE | Facility: CLINIC | Age: 74
End: 2019-07-01

## 2019-07-01 DIAGNOSIS — Z02.83 ENCOUNTER FOR EMPLOYMENT-RELATED DRUG TESTING: ICD-10-CM

## 2019-07-01 PROCEDURE — 80305 PR HAIR COLLECTION ONLY: ICD-10-PCS | Mod: S$GLB,,, | Performed by: NURSE PRACTITIONER

## 2019-07-01 PROCEDURE — 80305 DRUG TEST PRSMV DIR OPT OBS: CPT | Mod: S$GLB,,, | Performed by: NURSE PRACTITIONER

## 2019-07-01 PROCEDURE — 80305 DRUG TEST PRSMV DIR OPT OBS: CPT | Mod: S$GLB,,, | Performed by: EMERGENCY MEDICINE

## 2019-07-01 PROCEDURE — 99499 UNLISTED E&M SERVICE: CPT | Mod: S$GLB,,, | Performed by: NURSE PRACTITIONER

## 2019-07-01 PROCEDURE — 80305 PR COLLECTION ONLY DRUG SCREEN: ICD-10-PCS | Mod: S$GLB,,, | Performed by: EMERGENCY MEDICINE

## 2019-07-01 PROCEDURE — 99499 PHYSICAL - BASIC COMPLEXITY: ICD-10-PCS | Mod: S$GLB,,, | Performed by: NURSE PRACTITIONER

## 2019-12-31 ENCOUNTER — HOSPITAL ENCOUNTER (EMERGENCY)
Facility: HOSPITAL | Age: 74
Discharge: HOME | End: 2019-12-31
Attending: EMERGENCY MEDICINE
Payer: MEDICARE

## 2019-12-31 VITALS
HEART RATE: 72 BPM | BODY MASS INDEX: 29.62 KG/M2 | RESPIRATION RATE: 20 BRPM | DIASTOLIC BLOOD PRESSURE: 72 MMHG | TEMPERATURE: 99 F | OXYGEN SATURATION: 93 % | HEIGHT: 69 IN | SYSTOLIC BLOOD PRESSURE: 126 MMHG | WEIGHT: 200 LBS

## 2019-12-31 DIAGNOSIS — R10.9 ABDOMINAL PAIN: ICD-10-CM

## 2019-12-31 DIAGNOSIS — K92.1 MELENA: Primary | ICD-10-CM

## 2019-12-31 LAB
ALBUMIN SERPL BCP-MCNC: 4.2 G/DL (ref 3.5–5.2)
ALP SERPL-CCNC: 51 U/L (ref 55–135)
ALT SERPL W/O P-5'-P-CCNC: 19 U/L (ref 10–44)
ANION GAP SERPL CALC-SCNC: 10 MMOL/L (ref 8–16)
APTT PPP: 30.4 SEC (ref 23.6–33.3)
AST SERPL-CCNC: 22 U/L (ref 10–40)
BASOPHILS # BLD AUTO: 0.03 K/UL (ref 0–0.2)
BASOPHILS NFR BLD: 0.3 % (ref 0–1.9)
BILIRUB SERPL-MCNC: 1.6 MG/DL (ref 0.1–1)
BUN SERPL-MCNC: 15 MG/DL (ref 8–23)
CALCIUM SERPL-MCNC: 9 MG/DL (ref 8.7–10.5)
CHLORIDE SERPL-SCNC: 108 MMOL/L (ref 95–110)
CO2 SERPL-SCNC: 21 MMOL/L (ref 23–29)
CREAT SERPL-MCNC: 1.1 MG/DL (ref 0.5–1.4)
DIFFERENTIAL METHOD: ABNORMAL
EOSINOPHIL # BLD AUTO: 0.1 K/UL (ref 0–0.5)
EOSINOPHIL NFR BLD: 0.9 % (ref 0–8)
ERYTHROCYTE [DISTWIDTH] IN BLOOD BY AUTOMATED COUNT: 13.9 % (ref 11.5–14.5)
EST. GFR  (AFRICAN AMERICAN): >60 ML/MIN/1.73 M^2
EST. GFR  (NON AFRICAN AMERICAN): >60 ML/MIN/1.73 M^2
GLUCOSE SERPL-MCNC: 133 MG/DL (ref 70–110)
GLUCOSE SERPL-MCNC: 145 MG/DL (ref 70–110)
HCT VFR BLD AUTO: 51.4 % (ref 40–54)
HGB BLD-MCNC: 17.3 G/DL (ref 14–18)
IMM GRANULOCYTES # BLD AUTO: 0.03 K/UL (ref 0–0.04)
IMM GRANULOCYTES NFR BLD AUTO: 0.3 % (ref 0–0.5)
INR PPP: 1
LIPASE SERPL-CCNC: 39 U/L (ref 4–60)
LYMPHOCYTES # BLD AUTO: 0.9 K/UL (ref 1–4.8)
LYMPHOCYTES NFR BLD: 8.7 % (ref 18–48)
MCH RBC QN AUTO: 31.7 PG (ref 27–31)
MCHC RBC AUTO-ENTMCNC: 33.7 G/DL (ref 32–36)
MCV RBC AUTO: 94 FL (ref 82–98)
MONOCYTES # BLD AUTO: 1.1 K/UL (ref 0.3–1)
MONOCYTES NFR BLD: 11.3 % (ref 4–15)
NEUTROPHILS # BLD AUTO: 7.6 K/UL (ref 1.8–7.7)
NEUTROPHILS NFR BLD: 78.5 % (ref 38–73)
NRBC BLD-RTO: 0 /100 WBC
PLATELET # BLD AUTO: 163 K/UL (ref 150–350)
PMV BLD AUTO: 9.3 FL (ref 9.2–12.9)
POTASSIUM SERPL-SCNC: 4.1 MMOL/L (ref 3.5–5.1)
PROT SERPL-MCNC: 7.1 G/DL (ref 6–8.4)
PROTHROMBIN TIME: 13 SEC (ref 10.6–14.8)
RBC # BLD AUTO: 5.46 M/UL (ref 4.6–6.2)
SODIUM SERPL-SCNC: 139 MMOL/L (ref 136–145)
WBC # BLD AUTO: 9.74 K/UL (ref 3.9–12.7)

## 2019-12-31 PROCEDURE — 85610 PROTHROMBIN TIME: CPT

## 2019-12-31 PROCEDURE — 93005 ELECTROCARDIOGRAM TRACING: CPT

## 2019-12-31 PROCEDURE — 82962 GLUCOSE BLOOD TEST: CPT

## 2019-12-31 PROCEDURE — C9113 INJ PANTOPRAZOLE SODIUM, VIA: HCPCS

## 2019-12-31 PROCEDURE — 96374 THER/PROPH/DIAG INJ IV PUSH: CPT

## 2019-12-31 PROCEDURE — 83690 ASSAY OF LIPASE: CPT

## 2019-12-31 PROCEDURE — 80053 COMPREHEN METABOLIC PANEL: CPT

## 2019-12-31 PROCEDURE — 85025 COMPLETE CBC W/AUTO DIFF WBC: CPT

## 2019-12-31 PROCEDURE — 85730 THROMBOPLASTIN TIME PARTIAL: CPT

## 2019-12-31 PROCEDURE — 36415 COLL VENOUS BLD VENIPUNCTURE: CPT

## 2019-12-31 PROCEDURE — 99284 EMERGENCY DEPT VISIT MOD MDM: CPT | Mod: 25

## 2019-12-31 PROCEDURE — 63600175 PHARM REV CODE 636 W HCPCS

## 2019-12-31 RX ORDER — PANTOPRAZOLE SODIUM 20 MG/1
20 TABLET, DELAYED RELEASE ORAL DAILY
Qty: 14 TABLET | Refills: 0 | Status: ON HOLD | OUTPATIENT
Start: 2019-12-31 | End: 2021-08-26 | Stop reason: HOSPADM

## 2019-12-31 RX ORDER — PANTOPRAZOLE SODIUM 40 MG/10ML
40 INJECTION, POWDER, LYOPHILIZED, FOR SOLUTION INTRAVENOUS
Status: COMPLETED | OUTPATIENT
Start: 2019-12-31 | End: 2019-12-31

## 2019-12-31 RX ADMIN — PANTOPRAZOLE SODIUM 40 MG: 40 INJECTION, POWDER, FOR SOLUTION INTRAVENOUS at 11:12

## 2019-12-31 NOTE — DISCHARGE INSTRUCTIONS
Results for EDD BRUNER JR. (MRN [id number]) as of 12/31/2019 13:56   Ref. Range 2/7/2019 10:21 12/31/2019 11:25 12/31/2019 11:29   WBC Latest Ref Range: 3.90 - 12.70 K/uL  9.74    RBC Latest Ref Range: 4.60 - 6.20 M/uL  5.46    Hemoglobin Latest Ref Range: 14.0 - 18.0 g/dL  17.3    Hematocrit Latest Ref Range: 40.0 - 54.0 %  51.4    MCV Latest Ref Range: 82 - 98 fL  94    MCH Latest Ref Range: 27.0 - 31.0 pg  31.7 (H)    MCHC Latest Ref Range: 32.0 - 36.0 g/dL  33.7    RDW Latest Ref Range: 11.5 - 14.5 %  13.9    Platelets Latest Ref Range: 150 - 350 K/uL  163    MPV Latest Ref Range: 9.2 - 12.9 fL  9.3    Gran% Latest Ref Range: 38.0 - 73.0 %  78.5 (H)    Gran # (ANC) Latest Ref Range: 1.8 - 7.7 K/uL  7.6    Lymph% Latest Ref Range: 18.0 - 48.0 %  8.7 (L)    Lymph # Latest Ref Range: 1.0 - 4.8 K/uL  0.9 (L)    Mono% Latest Ref Range: 4.0 - 15.0 %  11.3    Mono # Latest Ref Range: 0.3 - 1.0 K/uL  1.1 (H)    Eosinophil% Latest Ref Range: 0.0 - 8.0 %  0.9    Eos # Latest Ref Range: 0.0 - 0.5 K/uL  0.1    Basophil% Latest Ref Range: 0.0 - 1.9 %  0.3    Baso # Latest Ref Range: 0.00 - 0.20 K/uL  0.03    nRBC Latest Ref Range: 0 /100 WBC  0    Differential Method Unknown  Automated    Immature Grans (Abs) Latest Ref Range: 0.00 - 0.04 K/uL  0.03    Immature Granulocytes Latest Ref Range: 0.0 - 0.5 %  0.3      Results for EDD BRUNER JR. (MRN [id number]) as of 12/31/2019 13:56   Ref. Range 2/7/2019 10:21 12/31/2019 11:25 12/31/2019 11:29   Sodium Latest Ref Range: 136 - 145 mmol/L  139    Potassium Latest Ref Range: 3.5 - 5.1 mmol/L  4.1    Chloride Latest Ref Range: 95 - 110 mmol/L  108    CO2 Latest Ref Range: 23 - 29 mmol/L  21 (L)    Anion Gap Latest Ref Range: 8 - 16 mmol/L  10    BUN, Bld Latest Ref Range: 8 - 23 mg/dL  15    Creatinine Latest Ref Range: 0.5 - 1.4 mg/dL  1.1    eGFR if non African American Latest Ref Range: >60 mL/min/1.73 m^2  >60.0    eGFR if African American Latest Ref Range: >60  mL/min/1.73 m^2  >60.0    Glucose Latest Ref Range: 70 - 110 mg/dL  145 (H)    Calcium Latest Ref Range: 8.7 - 10.5 mg/dL  9.0    Alkaline Phosphatase Latest Ref Range: 55 - 135 U/L  51 (L)    PROTEIN TOTAL Latest Ref Range: 6.0 - 8.4 g/dL  7.1    Albumin Latest Ref Range: 3.5 - 5.2 g/dL  4.2    BILIRUBIN TOTAL Latest Ref Range: 0.1 - 1.0 mg/dL  1.6 (H)    AST Latest Ref Range: 10 - 40 U/L  22    ALT Latest Ref Range: 10 - 44 U/L  19    Lipase Latest Ref Range: 4 - 60 U/L  39

## 2019-12-31 NOTE — ED PROVIDER NOTES
Encounter Date: 12/31/2019     History     Chief Complaint   Patient presents with    Abdominal Pain     NOT CONSTANT    Melena     YESTERDAY AM AND 2 PM     HPI   74-year-old male with history of diabetes, hypertension, thyroid cancer who presents with melena.  The patient does have a history of H pylori peptic ulcer disease which was treated about 22 years ago.  This was the last time he had an EGD performed.  He suffers from GERD occasionally takes over-the-counter medications for antacids.  Over the past 2 weeks he has had increased GERD symptoms in the epigastric region including mild burning following meals.  He sometimes feels bloated after eating and has noticed increased flatulence recently but otherwise has no severe pain. Over the past 3 days he has felt increased fatigue, and noticed 2 episodes of melena yesterday.  He has had no bright red bleeding per rectum and has not had a bowel movement yet today.  The patient did have a colonoscopy 4 weeks ago and was told that everything is normal. Denies chest pain, shortness of breath, and lightheadedness.    Review of patient's allergies indicates:   Allergen Reactions    Morphine      Past Medical History:   Diagnosis Date    Diabetes mellitus     GERD (gastroesophageal reflux disease)     Gout     [place] (hard of hearing)     BILAT AIDS    Hyperlipidemia     Hypertension     Kidney stone     Sleep apnea     CPAP  1    Thyroid cancer     Thyroid disease     Ulcer     Wears glasses      Past Surgical History:   Procedure Laterality Date    CATARACT EXTRACTION      ELBOW SURGERY  1973    Rt    HERNIA REPAIR  1999    LASER OF PROSTATE W/ GREEN LIGHT PVP  04/18/2017    [place] [place]    LITHOTRIPSY      TOTAL THYROIDECTOMY  8/18/2014    bilateral-[facility]     No family history on file.  Social History     Tobacco Use    Smoking status: Former Smoker     Packs/day: 1.00     Years: 10.00     Pack years: 10.00     Types: Cigarettes     Last attempt to  quit: 1980     Years since quittin.3    Smokeless tobacco: Former User     Quit date: 1981   Substance Use Topics    Alcohol use: Yes     Comment: 1 beer a month    Drug use: No     Review of Systems   Constitutional: Negative for fever.   HENT: Negative for sore throat.    Respiratory: Negative for shortness of breath.    Cardiovascular: Negative for chest pain.   Gastrointestinal: Positive for blood in stool. Negative for nausea and vomiting.   Genitourinary: Negative for dysuria.   Musculoskeletal: Negative for back pain.   Skin: Negative for rash.   Neurological: Negative for weakness.   Hematological: Does not bruise/bleed easily.       Physical Exam     Initial Vitals [19 1050]   BP Pulse Resp Temp SpO2   (!) 158/85 84 17 97.7 °F (36.5 °C) 96 %      MAP       --         Physical Exam    Constitutional: He appears well-developed and well-nourished. He is not diaphoretic. No distress.   Sitting up, speaking in full sentences, well appearing   HENT:   Head: Normocephalic and atraumatic.   Eyes: Conjunctivae and EOM are normal.   Neck: Normal range of motion. Neck supple.   Cardiovascular: Normal rate, regular rhythm and normal heart sounds. Exam reveals no gallop and no friction rub.    No murmur heard.  Pulmonary/Chest: Breath sounds normal. No respiratory distress. He has no wheezes. He has no rhonchi. He has no rales.   Abdominal: Soft. He exhibits no distension. There is no tenderness. There is no rebound and no guarding.   No abdominal tenderness to deep palpation.   Musculoskeletal: He exhibits no edema or tenderness.   Neurological: He is alert and oriented to person, place, and time.   Skin: Skin is warm and dry.   Psychiatric: He has a normal mood and affect. Thought content normal.         ED Course   Procedures  Labs Reviewed   CBC W/ AUTO DIFFERENTIAL   COMPREHENSIVE METABOLIC PANEL   URINALYSIS, REFLEX TO URINE CULTURE   LIPASE   APTT   OCCULT BLOOD X 1, STOOL          Imaging  Results    None          Medical Decision Making:   Initial Assessment:   74-year-old male with history of diabetes, hypertension, peptic ulcer disease presents with fatigue and melena for the past 2 days  Given a negative colonoscopy 4 weeks ago and a history of herpes culture disease, I suspect upper GI bleed.  His vital signs are notable for hypertension but he did not take his medication this morning.  He has no tachycardia.  Pending lab workup including CBC.  He was given 40 mg IV Protonix here.    Albert Barbosa MD  Resident, PGY-3  12/31/2019 12:00 PM    PGY3 UPDATE:  Labs demonstrating overall unremarkable CMP.  His T bili is up at 1.6, but no other signs of obstruction.  CBC is remarkable for hemoglobin of 17.3 with hematocrit of 51.4.  He remains asymptomatic during his time here and vital signs have remained stable. Coag studies are normal. EKG demonstrates right bundle branch block with normal sinus rhythm and no ST elevation.  Overall there are no significant changes from his previous EKG 2 years ago.  He does recall taking Pepto-Bismol recently and this could be the source of his black stool, however with his increased GERD symptoms cannot rule out upper GI bleed.  I am recommending follow-up with both his primary care physician as well as his regular gastroenterologist who performed his colonoscopy last month.  He will return with any worsening symptoms including lightheadedness, chest pain, shortness of breath, or large bleeding.  He will take protonix for 14 days in the meantime and see his PCP.    Albert Barbosa MD  Resident, PGY-3  12/31/2019 2:12 PM                                   Clinical Impression:       ICD-10-CM ICD-9-CM   1. Melena K92.1 578.1   2. Abdominal pain R10.9 789.00                             Albert Barbosa MD  Resident  12/31/19 [id number]

## 2020-02-10 DIAGNOSIS — Z85.850 PERSONAL HISTORY OF MALIGNANT NEOPLASM OF THYROID: Primary | ICD-10-CM

## 2020-02-13 ENCOUNTER — HOSPITAL ENCOUNTER (OUTPATIENT)
Dept: RADIOLOGY | Facility: HOSPITAL | Age: 75
Discharge: HOME | End: 2020-02-13
Attending: INTERNAL MEDICINE
Payer: MEDICARE

## 2020-02-13 DIAGNOSIS — Z85.850 PERSONAL HISTORY OF MALIGNANT NEOPLASM OF THYROID: ICD-10-CM

## 2020-02-13 PROCEDURE — 76536 US EXAM OF HEAD AND NECK: CPT | Mod: TC,PO

## 2021-08-07 PROBLEM — Z85.828 HISTORY OF NONMELANOMA SKIN CANCER: Status: ACTIVE | Noted: 2021-08-07

## 2021-08-07 PROBLEM — Z85.820 HISTORY OF MALIGNANT MELANOMA: Status: ACTIVE | Noted: 2021-08-07

## 2021-08-15 ENCOUNTER — OFFICE VISIT (OUTPATIENT)
Dept: URGENT CARE | Facility: CLINIC | Age: 76
End: 2021-08-15
Payer: MEDICARE

## 2021-08-15 VITALS
HEART RATE: 85 BPM | TEMPERATURE: 98 F | BODY MASS INDEX: 28.63 KG/M2 | HEIGHT: 70 IN | RESPIRATION RATE: 15 BRPM | DIASTOLIC BLOOD PRESSURE: 86 MMHG | SYSTOLIC BLOOD PRESSURE: 135 MMHG | OXYGEN SATURATION: 94 % | WEIGHT: 200 LBS

## 2021-08-15 DIAGNOSIS — U07.1 COVID-19: ICD-10-CM

## 2021-08-15 DIAGNOSIS — Z11.52 ENCOUNTER FOR SCREENING FOR COVID-19: Primary | ICD-10-CM

## 2021-08-15 LAB
CTP QC/QA: YES
SARS-COV-2 AG RESP QL IA.RAPID: POSITIVE

## 2021-08-15 PROCEDURE — 99203 PR OFFICE/OUTPT VISIT, NEW, LEVL III, 30-44 MIN: ICD-10-PCS | Mod: S$GLB,,, | Performed by: NURSE PRACTITIONER

## 2021-08-15 PROCEDURE — 3075F SYST BP GE 130 - 139MM HG: CPT | Mod: CPTII,S$GLB,, | Performed by: NURSE PRACTITIONER

## 2021-08-15 PROCEDURE — 1159F PR MEDICATION LIST DOCUMENTED IN MEDICAL RECORD: ICD-10-PCS | Mod: CPTII,S$GLB,, | Performed by: NURSE PRACTITIONER

## 2021-08-15 PROCEDURE — 87426 SARS CORONAVIRUS 2 ANTIGEN POCT: ICD-10-PCS | Mod: QW,S$GLB,, | Performed by: NURSE PRACTITIONER

## 2021-08-15 PROCEDURE — 87426 SARSCOV CORONAVIRUS AG IA: CPT | Mod: QW,S$GLB,, | Performed by: NURSE PRACTITIONER

## 2021-08-15 PROCEDURE — 3079F PR MOST RECENT DIASTOLIC BLOOD PRESSURE 80-89 MM HG: ICD-10-PCS | Mod: CPTII,S$GLB,, | Performed by: NURSE PRACTITIONER

## 2021-08-15 PROCEDURE — 3008F BODY MASS INDEX DOCD: CPT | Mod: CPTII,S$GLB,, | Performed by: NURSE PRACTITIONER

## 2021-08-15 PROCEDURE — 3075F PR MOST RECENT SYSTOLIC BLOOD PRESS GE 130-139MM HG: ICD-10-PCS | Mod: CPTII,S$GLB,, | Performed by: NURSE PRACTITIONER

## 2021-08-15 PROCEDURE — 3008F PR BODY MASS INDEX (BMI) DOCUMENTED: ICD-10-PCS | Mod: CPTII,S$GLB,, | Performed by: NURSE PRACTITIONER

## 2021-08-15 PROCEDURE — 1159F MED LIST DOCD IN RCRD: CPT | Mod: CPTII,S$GLB,, | Performed by: NURSE PRACTITIONER

## 2021-08-15 PROCEDURE — 99203 OFFICE O/P NEW LOW 30 MIN: CPT | Mod: S$GLB,,, | Performed by: NURSE PRACTITIONER

## 2021-08-15 PROCEDURE — 3079F DIAST BP 80-89 MM HG: CPT | Mod: CPTII,S$GLB,, | Performed by: NURSE PRACTITIONER

## 2021-08-21 ENCOUNTER — HOSPITAL ENCOUNTER (INPATIENT)
Facility: HOSPITAL | Age: 76
LOS: 5 days | Discharge: HOME | DRG: 177 | End: 2021-08-26
Attending: EMERGENCY MEDICINE | Admitting: FAMILY MEDICINE
Payer: MEDICARE

## 2021-08-21 DIAGNOSIS — J12.82 PNEUMONIA DUE TO COVID-19 VIRUS: Primary | ICD-10-CM

## 2021-08-21 DIAGNOSIS — Z99.81 HYPOXEMIA REQUIRING SUPPLEMENTAL OXYGEN: ICD-10-CM

## 2021-08-21 DIAGNOSIS — R09.02 HYPOXEMIA REQUIRING SUPPLEMENTAL OXYGEN: ICD-10-CM

## 2021-08-21 DIAGNOSIS — Z20.822 SUSPECTED COVID-19 VIRUS INFECTION: ICD-10-CM

## 2021-08-21 DIAGNOSIS — U07.1 PNEUMONIA DUE TO COVID-19 VIRUS: Primary | ICD-10-CM

## 2021-08-21 PROBLEM — N17.9 AKI (ACUTE KIDNEY INJURY): Status: ACTIVE | Noted: 2021-08-21

## 2021-08-21 PROBLEM — E11.9 DMII (DIABETES MELLITUS, TYPE 2): Status: ACTIVE | Noted: 2021-08-21

## 2021-08-21 PROBLEM — M62.82 NON-TRAUMATIC RHABDOMYOLYSIS: Status: ACTIVE | Noted: 2021-08-21

## 2021-08-21 LAB
25(OH)D3+25(OH)D2 SERPL-MCNC: 40 NG/ML (ref 30–96)
ALBUMIN SERPL BCP-MCNC: 3.5 G/DL (ref 3.5–5.2)
ALP SERPL-CCNC: 70 U/L (ref 55–135)
ALT SERPL W/O P-5'-P-CCNC: 34 U/L (ref 10–44)
ANION GAP SERPL CALC-SCNC: 17 MMOL/L (ref 8–16)
APTT PPP: 31.3 SEC (ref 25.6–35.8)
AST SERPL-CCNC: 87 U/L (ref 10–40)
BACTERIA #/AREA URNS HPF: NEGATIVE /HPF
BASOPHILS # BLD AUTO: 0 K/UL (ref 0–0.2)
BASOPHILS NFR BLD: 0 % (ref 0–1.9)
BILIRUB SERPL-MCNC: 1.2 MG/DL (ref 0.1–1)
BILIRUB UR QL STRIP: NEGATIVE
BNP SERPL-MCNC: 83 PG/ML (ref 0–99)
BNP SERPL-MCNC: 83 PG/ML (ref 0–99)
BUN SERPL-MCNC: 32 MG/DL (ref 8–23)
CALCIUM SERPL-MCNC: 8.5 MG/DL (ref 8.7–10.5)
CHLORIDE SERPL-SCNC: 101 MMOL/L (ref 95–110)
CK SERPL-CCNC: 2332 U/L (ref 20–200)
CLARITY UR: CLEAR
CO2 SERPL-SCNC: 20 MMOL/L (ref 23–29)
COLOR UR: YELLOW
CREAT SERPL-MCNC: 1.7 MG/DL (ref 0.5–1.4)
CRP SERPL-MCNC: 17.92 MG/DL
D DIMER PPP IA.FEU-MCNC: 1.2 UG/ML FEU
DIFFERENTIAL METHOD: ABNORMAL
EOSINOPHIL # BLD AUTO: 0 K/UL (ref 0–0.5)
EOSINOPHIL NFR BLD: 0 % (ref 0–8)
ERYTHROCYTE [DISTWIDTH] IN BLOOD BY AUTOMATED COUNT: 14.2 % (ref 11.5–14.5)
ERYTHROCYTE [SEDIMENTATION RATE] IN BLOOD BY WESTERGREN METHOD: 33 MM/HR (ref 0–10)
EST. GFR  (AFRICAN AMERICAN): 44.9 ML/MIN/1.73 M^2
EST. GFR  (NON AFRICAN AMERICAN): 38.9 ML/MIN/1.73 M^2
FERRITIN SERPL-MCNC: 1329 NG/ML (ref 20–300)
GLUCOSE SERPL-MCNC: 290 MG/DL (ref 70–110)
GLUCOSE UR QL STRIP: ABNORMAL
HCT VFR BLD AUTO: 49.3 % (ref 40–54)
HGB BLD-MCNC: 16.8 G/DL (ref 14–18)
HGB UR QL STRIP: ABNORMAL
HYALINE CASTS #/AREA URNS LPF: 1 /LPF
IMM GRANULOCYTES # BLD AUTO: 0.02 K/UL (ref 0–0.04)
IMM GRANULOCYTES NFR BLD AUTO: 0.5 % (ref 0–0.5)
INR PPP: 1
KETONES UR QL STRIP: ABNORMAL
LACTATE SERPL-SCNC: 2.3 MMOL/L (ref 0.5–1.9)
LDH SERPL L TO P-CCNC: 352 U/L (ref 110–260)
LEUKOCYTE ESTERASE UR QL STRIP: NEGATIVE
LIPASE SERPL-CCNC: 33 U/L (ref 4–60)
LYMPHOCYTES # BLD AUTO: 0.4 K/UL (ref 1–4.8)
LYMPHOCYTES NFR BLD: 8.9 % (ref 18–48)
MAGNESIUM SERPL-MCNC: 2.1 MG/DL (ref 1.6–2.6)
MCH RBC QN AUTO: 31.5 PG (ref 27–31)
MCHC RBC AUTO-ENTMCNC: 34.1 G/DL (ref 32–36)
MCV RBC AUTO: 92 FL (ref 82–98)
MICROSCOPIC COMMENT: NORMAL
MONOCYTES # BLD AUTO: 0.5 K/UL (ref 0.3–1)
MONOCYTES NFR BLD: 11.4 % (ref 4–15)
NEUTROPHILS # BLD AUTO: 3.4 K/UL (ref 1.8–7.7)
NEUTROPHILS NFR BLD: 79.2 % (ref 38–73)
NITRITE UR QL STRIP: NEGATIVE
NRBC BLD-RTO: 0 /100 WBC
PH UR STRIP: 6 [PH] (ref 5–8)
PLATELET # BLD AUTO: 74 K/UL (ref 150–450)
PLATELET BLD QL SMEAR: ABNORMAL
PMV BLD AUTO: 10.1 FL (ref 9.2–12.9)
POTASSIUM SERPL-SCNC: 4.6 MMOL/L (ref 3.5–5.1)
PROCALCITONIN SERPL IA-MCNC: 1.27 NG/ML (ref 0–0.5)
PROT SERPL-MCNC: 6.8 G/DL (ref 6–8.4)
PROT UR QL STRIP: ABNORMAL
PROTHROMBIN TIME: 12.6 SEC (ref 11.8–14.3)
RBC # BLD AUTO: 5.34 M/UL (ref 4.6–6.2)
RBC #/AREA URNS HPF: 2 /HPF (ref 0–4)
SODIUM SERPL-SCNC: 138 MMOL/L (ref 136–145)
SP GR UR STRIP: 1.02 (ref 1–1.03)
SQUAMOUS #/AREA URNS HPF: 1 /HPF
TROPONIN I SERPL DL<=0.01 NG/ML-MCNC: 0.04 NG/ML
TSH SERPL DL<=0.005 MIU/L-ACNC: 0.86 UIU/ML (ref 0.34–5.6)
URN SPEC COLLECT METH UR: ABNORMAL
UROBILINOGEN UR STRIP-ACNC: 1 EU/DL
WBC # BLD AUTO: 4.28 K/UL (ref 3.9–12.7)
WBC #/AREA URNS HPF: 1 /HPF (ref 0–5)
YEAST URNS QL MICRO: NORMAL

## 2021-08-21 PROCEDURE — 85610 PROTHROMBIN TIME: CPT | Performed by: EMERGENCY MEDICINE

## 2021-08-21 PROCEDURE — 82728 ASSAY OF FERRITIN: CPT | Performed by: NURSE PRACTITIONER

## 2021-08-21 PROCEDURE — 83605 ASSAY OF LACTIC ACID: CPT | Performed by: NURSE PRACTITIONER

## 2021-08-21 PROCEDURE — 63600175 PHARM REV CODE 636 W HCPCS: Performed by: EMERGENCY MEDICINE

## 2021-08-21 PROCEDURE — 83880 ASSAY OF NATRIURETIC PEPTIDE: CPT | Performed by: NURSE PRACTITIONER

## 2021-08-21 PROCEDURE — 25000003 PHARM REV CODE 250: Performed by: EMERGENCY MEDICINE

## 2021-08-21 PROCEDURE — 82306 VITAMIN D 25 HYDROXY: CPT | Performed by: NURSE PRACTITIONER

## 2021-08-21 PROCEDURE — 80053 COMPREHEN METABOLIC PANEL: CPT | Performed by: NURSE PRACTITIONER

## 2021-08-21 PROCEDURE — 87040 BLOOD CULTURE FOR BACTERIA: CPT | Performed by: NURSE PRACTITIONER

## 2021-08-21 PROCEDURE — 99285 EMERGENCY DEPT VISIT HI MDM: CPT | Mod: 25

## 2021-08-21 PROCEDURE — 82550 ASSAY OF CK (CPK): CPT | Performed by: NURSE PRACTITIONER

## 2021-08-21 PROCEDURE — 84145 PROCALCITONIN (PCT): CPT | Performed by: NURSE PRACTITIONER

## 2021-08-21 PROCEDURE — 94761 N-INVAS EAR/PLS OXIMETRY MLT: CPT

## 2021-08-21 PROCEDURE — 12000002 HC ACUTE/MED SURGE SEMI-PRIVATE ROOM

## 2021-08-21 PROCEDURE — 99900035 HC TECH TIME PER 15 MIN (STAT)

## 2021-08-21 PROCEDURE — 25000003 PHARM REV CODE 250: Performed by: NURSE PRACTITIONER

## 2021-08-21 PROCEDURE — 84443 ASSAY THYROID STIM HORMONE: CPT | Performed by: EMERGENCY MEDICINE

## 2021-08-21 PROCEDURE — 63600175 PHARM REV CODE 636 W HCPCS: Performed by: FAMILY MEDICINE

## 2021-08-21 PROCEDURE — 85025 COMPLETE CBC W/AUTO DIFF WBC: CPT | Performed by: NURSE PRACTITIONER

## 2021-08-21 PROCEDURE — 93010 EKG 12-LEAD: ICD-10-PCS | Mod: ,,, | Performed by: SPECIALIST

## 2021-08-21 PROCEDURE — 83615 LACTATE (LD) (LDH) ENZYME: CPT | Performed by: NURSE PRACTITIONER

## 2021-08-21 PROCEDURE — 83690 ASSAY OF LIPASE: CPT | Performed by: EMERGENCY MEDICINE

## 2021-08-21 PROCEDURE — 99900031 HC PATIENT EDUCATION (STAT)

## 2021-08-21 PROCEDURE — 96374 THER/PROPH/DIAG INJ IV PUSH: CPT

## 2021-08-21 PROCEDURE — 84484 ASSAY OF TROPONIN QUANT: CPT | Performed by: NURSE PRACTITIONER

## 2021-08-21 PROCEDURE — 85651 RBC SED RATE NONAUTOMATED: CPT | Performed by: NURSE PRACTITIONER

## 2021-08-21 PROCEDURE — 86140 C-REACTIVE PROTEIN: CPT | Performed by: NURSE PRACTITIONER

## 2021-08-21 PROCEDURE — 27000221 HC OXYGEN, UP TO 24 HOURS

## 2021-08-21 PROCEDURE — 83735 ASSAY OF MAGNESIUM: CPT | Performed by: EMERGENCY MEDICINE

## 2021-08-21 PROCEDURE — 36415 COLL VENOUS BLD VENIPUNCTURE: CPT | Performed by: NURSE PRACTITIONER

## 2021-08-21 PROCEDURE — 81001 URINALYSIS AUTO W/SCOPE: CPT | Performed by: EMERGENCY MEDICINE

## 2021-08-21 PROCEDURE — 93010 ELECTROCARDIOGRAM REPORT: CPT | Mod: ,,, | Performed by: SPECIALIST

## 2021-08-21 PROCEDURE — 85379 FIBRIN DEGRADATION QUANT: CPT | Performed by: NURSE PRACTITIONER

## 2021-08-21 PROCEDURE — 93005 ELECTROCARDIOGRAM TRACING: CPT | Performed by: SPECIALIST

## 2021-08-21 PROCEDURE — 85730 THROMBOPLASTIN TIME PARTIAL: CPT | Performed by: EMERGENCY MEDICINE

## 2021-08-21 RX ORDER — ACETAMINOPHEN 325 MG/1
650 TABLET ORAL EVERY 8 HOURS PRN
Status: DISCONTINUED | OUTPATIENT
Start: 2021-08-21 | End: 2021-08-26 | Stop reason: HOSPADM

## 2021-08-21 RX ORDER — MEROPENEM AND SODIUM CHLORIDE 1 G/50ML
1 INJECTION, SOLUTION INTRAVENOUS ONCE
Status: COMPLETED | OUTPATIENT
Start: 2021-08-21 | End: 2021-08-21

## 2021-08-21 RX ORDER — IBUPROFEN 200 MG
24 TABLET ORAL
Status: DISCONTINUED | OUTPATIENT
Start: 2021-08-21 | End: 2021-08-21

## 2021-08-21 RX ORDER — TALC
6 POWDER (GRAM) TOPICAL NIGHTLY PRN
Status: DISCONTINUED | OUTPATIENT
Start: 2021-08-21 | End: 2021-08-24

## 2021-08-21 RX ORDER — LANOLIN ALCOHOL/MO/W.PET/CERES
800 CREAM (GRAM) TOPICAL
Status: DISCONTINUED | OUTPATIENT
Start: 2021-08-21 | End: 2021-08-26 | Stop reason: HOSPADM

## 2021-08-21 RX ORDER — LEVOTHYROXINE SODIUM 100 UG/1
200 TABLET ORAL
Status: DISCONTINUED | OUTPATIENT
Start: 2021-08-23 | End: 2021-08-26 | Stop reason: HOSPADM

## 2021-08-21 RX ORDER — ALBUTEROL SULFATE 90 UG/1
2 AEROSOL, METERED RESPIRATORY (INHALATION) EVERY 4 HOURS PRN
Status: DISCONTINUED | OUTPATIENT
Start: 2021-08-21 | End: 2021-08-26 | Stop reason: HOSPADM

## 2021-08-21 RX ORDER — PANTOPRAZOLE SODIUM 40 MG/1
40 TABLET, DELAYED RELEASE ORAL DAILY
Status: DISCONTINUED | OUTPATIENT
Start: 2021-08-22 | End: 2021-08-26 | Stop reason: HOSPADM

## 2021-08-21 RX ORDER — GLUCAGON 1 MG
1 KIT INJECTION
Status: DISCONTINUED | OUTPATIENT
Start: 2021-08-21 | End: 2021-08-21

## 2021-08-21 RX ORDER — DEXAMETHASONE SODIUM PHOSPHATE 4 MG/ML
8 INJECTION, SOLUTION INTRA-ARTICULAR; INTRALESIONAL; INTRAMUSCULAR; INTRAVENOUS; SOFT TISSUE
Status: COMPLETED | OUTPATIENT
Start: 2021-08-21 | End: 2021-08-21

## 2021-08-21 RX ORDER — ENOXAPARIN SODIUM 100 MG/ML
1 INJECTION SUBCUTANEOUS 2 TIMES DAILY
Status: DISCONTINUED | OUTPATIENT
Start: 2021-08-21 | End: 2021-08-21

## 2021-08-21 RX ORDER — MELATONIN 10 MG
1 CAPSULE ORAL NIGHTLY
COMMUNITY
End: 2023-11-06 | Stop reason: CLARIF

## 2021-08-21 RX ORDER — IBUPROFEN 200 MG
16 TABLET ORAL
Status: DISCONTINUED | OUTPATIENT
Start: 2021-08-21 | End: 2021-08-21

## 2021-08-21 RX ORDER — ASCORBIC ACID 500 MG
500 TABLET ORAL 2 TIMES DAILY
Status: DISCONTINUED | OUTPATIENT
Start: 2021-08-21 | End: 2021-08-22

## 2021-08-21 RX ORDER — IBUPROFEN 200 MG
24 TABLET ORAL
Status: DISCONTINUED | OUTPATIENT
Start: 2021-08-21 | End: 2021-08-22

## 2021-08-21 RX ORDER — SODIUM CHLORIDE 0.9 % (FLUSH) 0.9 %
10 SYRINGE (ML) INJECTION
Status: DISCONTINUED | OUTPATIENT
Start: 2021-08-21 | End: 2021-08-26 | Stop reason: HOSPADM

## 2021-08-21 RX ORDER — SODIUM CHLORIDE 9 MG/ML
INJECTION, SOLUTION INTRAVENOUS
Status: COMPLETED | OUTPATIENT
Start: 2021-08-21 | End: 2021-08-21

## 2021-08-21 RX ORDER — INSULIN ASPART 100 [IU]/ML
0-5 INJECTION, SOLUTION INTRAVENOUS; SUBCUTANEOUS
Status: DISCONTINUED | OUTPATIENT
Start: 2021-08-21 | End: 2021-08-22

## 2021-08-21 RX ORDER — GUAIFENESIN/DEXTROMETHORPHAN 100-10MG/5
10 SYRUP ORAL EVERY 4 HOURS PRN
Status: DISCONTINUED | OUTPATIENT
Start: 2021-08-21 | End: 2021-08-26 | Stop reason: HOSPADM

## 2021-08-21 RX ORDER — IBUPROFEN 200 MG
16 TABLET ORAL
Status: DISCONTINUED | OUTPATIENT
Start: 2021-08-21 | End: 2021-08-22

## 2021-08-21 RX ORDER — ENOXAPARIN SODIUM 100 MG/ML
40 INJECTION SUBCUTANEOUS
Status: DISCONTINUED | OUTPATIENT
Start: 2021-08-21 | End: 2021-08-22

## 2021-08-21 RX ORDER — LEVOTHYROXINE SODIUM 200 UG/1
200 TABLET ORAL
COMMUNITY
End: 2023-11-06 | Stop reason: CLARIF

## 2021-08-21 RX ORDER — ENOXAPARIN SODIUM 100 MG/ML
1 INJECTION SUBCUTANEOUS
Status: DISCONTINUED | OUTPATIENT
Start: 2021-08-21 | End: 2021-08-21

## 2021-08-21 RX ORDER — SODIUM,POTASSIUM PHOSPHATES 280-250MG
2 POWDER IN PACKET (EA) ORAL
Status: DISCONTINUED | OUTPATIENT
Start: 2021-08-21 | End: 2021-08-26 | Stop reason: HOSPADM

## 2021-08-21 RX ORDER — ENOXAPARIN SODIUM 100 MG/ML
40 INJECTION SUBCUTANEOUS EVERY 24 HOURS
Status: DISCONTINUED | OUTPATIENT
Start: 2021-08-21 | End: 2021-08-21

## 2021-08-21 RX ORDER — GLUCAGON 1 MG
1 KIT INJECTION
Status: DISCONTINUED | OUTPATIENT
Start: 2021-08-21 | End: 2021-08-22

## 2021-08-21 RX ORDER — ALBUTEROL SULFATE 90 UG/1
2 AEROSOL, METERED RESPIRATORY (INHALATION) EVERY 6 HOURS
Status: DISCONTINUED | OUTPATIENT
Start: 2021-08-21 | End: 2021-08-21

## 2021-08-21 RX ORDER — ACETAMINOPHEN 325 MG/1
650 TABLET ORAL EVERY 4 HOURS PRN
Status: DISCONTINUED | OUTPATIENT
Start: 2021-08-21 | End: 2021-08-26 | Stop reason: HOSPADM

## 2021-08-21 RX ADMIN — MEROPENEM AND SODIUM CHLORIDE 1 G: 1 INJECTION, SOLUTION INTRAVENOUS at 02:08

## 2021-08-21 RX ADMIN — REMDESIVIR 200 MG: 100 INJECTION, POWDER, LYOPHILIZED, FOR SOLUTION INTRAVENOUS at 09:08

## 2021-08-21 RX ADMIN — OXYCODONE HYDROCHLORIDE AND ACETAMINOPHEN 500 MG: 500 TABLET ORAL at 09:08

## 2021-08-21 RX ADMIN — ENOXAPARIN SODIUM 40 MG: 40 INJECTION SUBCUTANEOUS at 09:08

## 2021-08-21 RX ADMIN — DEXAMETHASONE SODIUM PHOSPHATE 8 MG: 4 INJECTION, SOLUTION INTRA-ARTICULAR; INTRALESIONAL; INTRAMUSCULAR; INTRAVENOUS; SOFT TISSUE at 04:08

## 2021-08-21 RX ADMIN — SODIUM CHLORIDE: 0.9 INJECTION, SOLUTION INTRAVENOUS at 04:08

## 2021-08-22 PROBLEM — J96.01 ACUTE RESPIRATORY FAILURE WITH HYPOXIA: Status: ACTIVE | Noted: 2021-08-22

## 2021-08-22 PROBLEM — D69.6 THROMBOCYTOPENIA: Status: ACTIVE | Noted: 2021-08-22

## 2021-08-22 LAB
ALBUMIN SERPL BCP-MCNC: 3.3 G/DL (ref 3.5–5.2)
ALP SERPL-CCNC: 80 U/L (ref 55–135)
ALT SERPL W/O P-5'-P-CCNC: 51 U/L (ref 10–44)
ANION GAP SERPL CALC-SCNC: 14 MMOL/L (ref 8–16)
AST SERPL-CCNC: 140 U/L (ref 10–40)
BASOPHILS # BLD AUTO: 0.01 K/UL (ref 0–0.2)
BASOPHILS NFR BLD: 0.4 % (ref 0–1.9)
BILIRUB SERPL-MCNC: 1.4 MG/DL (ref 0.1–1)
BUN SERPL-MCNC: 26 MG/DL (ref 8–23)
CALCIUM SERPL-MCNC: 9.1 MG/DL (ref 8.7–10.5)
CHLORIDE SERPL-SCNC: 109 MMOL/L (ref 95–110)
CO2 SERPL-SCNC: 21 MMOL/L (ref 23–29)
CREAT SERPL-MCNC: 1.3 MG/DL (ref 0.5–1.4)
CRP SERPL-MCNC: 15.59 MG/DL
DIFFERENTIAL METHOD: ABNORMAL
EOSINOPHIL # BLD AUTO: 0 K/UL (ref 0–0.5)
EOSINOPHIL NFR BLD: 0 % (ref 0–8)
ERYTHROCYTE [DISTWIDTH] IN BLOOD BY AUTOMATED COUNT: 14 % (ref 11.5–14.5)
EST. GFR  (AFRICAN AMERICAN): >60 ML/MIN/1.73 M^2
EST. GFR  (NON AFRICAN AMERICAN): 53.8 ML/MIN/1.73 M^2
GLUCOSE SERPL-MCNC: 294 MG/DL (ref 70–110)
GLUCOSE SERPL-MCNC: 308 MG/DL (ref 70–110)
GLUCOSE SERPL-MCNC: 322 MG/DL (ref 70–110)
HCT VFR BLD AUTO: 49.4 % (ref 40–54)
HGB BLD-MCNC: 16.9 G/DL (ref 14–18)
IMM GRANULOCYTES # BLD AUTO: 0.02 K/UL (ref 0–0.04)
IMM GRANULOCYTES NFR BLD AUTO: 0.7 % (ref 0–0.5)
LYMPHOCYTES # BLD AUTO: 0.3 K/UL (ref 1–4.8)
LYMPHOCYTES NFR BLD: 10.7 % (ref 18–48)
MAGNESIUM SERPL-MCNC: 2.2 MG/DL (ref 1.6–2.6)
MCH RBC QN AUTO: 32.6 PG (ref 27–31)
MCHC RBC AUTO-ENTMCNC: 34.2 G/DL (ref 32–36)
MCV RBC AUTO: 95 FL (ref 82–98)
MONOCYTES # BLD AUTO: 0.5 K/UL (ref 0.3–1)
MONOCYTES NFR BLD: 16.1 % (ref 4–15)
NEUTROPHILS # BLD AUTO: 2 K/UL (ref 1.8–7.7)
NEUTROPHILS NFR BLD: 72.1 % (ref 38–73)
NRBC BLD-RTO: 0 /100 WBC
PHOSPHATE SERPL-MCNC: 2.9 MG/DL (ref 2.7–4.5)
PLATELET # BLD AUTO: 97 K/UL (ref 150–450)
PMV BLD AUTO: 9.7 FL (ref 9.2–12.9)
POTASSIUM SERPL-SCNC: 5.1 MMOL/L (ref 3.5–5.1)
PROT SERPL-MCNC: 6.5 G/DL (ref 6–8.4)
RBC # BLD AUTO: 5.19 M/UL (ref 4.6–6.2)
SODIUM SERPL-SCNC: 144 MMOL/L (ref 136–145)
WBC # BLD AUTO: 2.8 K/UL (ref 3.9–12.7)

## 2021-08-22 PROCEDURE — 85025 COMPLETE CBC W/AUTO DIFF WBC: CPT | Performed by: NURSE PRACTITIONER

## 2021-08-22 PROCEDURE — 25000003 PHARM REV CODE 250: Performed by: NURSE PRACTITIONER

## 2021-08-22 PROCEDURE — 99900035 HC TECH TIME PER 15 MIN (STAT)

## 2021-08-22 PROCEDURE — 94761 N-INVAS EAR/PLS OXIMETRY MLT: CPT

## 2021-08-22 PROCEDURE — 86140 C-REACTIVE PROTEIN: CPT | Performed by: NURSE PRACTITIONER

## 2021-08-22 PROCEDURE — 99900031 HC PATIENT EDUCATION (STAT)

## 2021-08-22 PROCEDURE — 25000003 PHARM REV CODE 250: Performed by: INTERNAL MEDICINE

## 2021-08-22 PROCEDURE — 63600175 PHARM REV CODE 636 W HCPCS: Performed by: NURSE PRACTITIONER

## 2021-08-22 PROCEDURE — 25000242 PHARM REV CODE 250 ALT 637 W/ HCPCS: Performed by: NURSE PRACTITIONER

## 2021-08-22 PROCEDURE — 63600175 PHARM REV CODE 636 W HCPCS: Performed by: INTERNAL MEDICINE

## 2021-08-22 PROCEDURE — 80053 COMPREHEN METABOLIC PANEL: CPT | Performed by: NURSE PRACTITIONER

## 2021-08-22 PROCEDURE — 84100 ASSAY OF PHOSPHORUS: CPT | Performed by: NURSE PRACTITIONER

## 2021-08-22 PROCEDURE — 27000221 HC OXYGEN, UP TO 24 HOURS

## 2021-08-22 PROCEDURE — 12000002 HC ACUTE/MED SURGE SEMI-PRIVATE ROOM

## 2021-08-22 PROCEDURE — 83735 ASSAY OF MAGNESIUM: CPT | Performed by: NURSE PRACTITIONER

## 2021-08-22 RX ORDER — OXYBUTYNIN CHLORIDE 5 MG/1
5 TABLET, EXTENDED RELEASE ORAL DAILY
Status: DISCONTINUED | OUTPATIENT
Start: 2021-08-22 | End: 2021-08-26 | Stop reason: HOSPADM

## 2021-08-22 RX ORDER — ENOXAPARIN SODIUM 100 MG/ML
1 INJECTION SUBCUTANEOUS
Status: DISCONTINUED | OUTPATIENT
Start: 2021-08-22 | End: 2021-08-26 | Stop reason: HOSPADM

## 2021-08-22 RX ORDER — AMLODIPINE BESYLATE 5 MG/1
5 TABLET ORAL DAILY
Status: DISCONTINUED | OUTPATIENT
Start: 2021-08-22 | End: 2021-08-26 | Stop reason: HOSPADM

## 2021-08-22 RX ORDER — ALLOPURINOL 100 MG/1
300 TABLET ORAL DAILY
Status: DISCONTINUED | OUTPATIENT
Start: 2021-08-22 | End: 2021-08-26 | Stop reason: HOSPADM

## 2021-08-22 RX ORDER — LEVOFLOXACIN 5 MG/ML
750 INJECTION, SOLUTION INTRAVENOUS
Status: DISCONTINUED | OUTPATIENT
Start: 2021-08-22 | End: 2021-08-24

## 2021-08-22 RX ADMIN — REMDESIVIR 100 MG: 100 INJECTION, POWDER, LYOPHILIZED, FOR SOLUTION INTRAVENOUS at 09:08

## 2021-08-22 RX ADMIN — ALLOPURINOL 300 MG: 100 TABLET ORAL at 02:08

## 2021-08-22 RX ADMIN — AMLODIPINE BESYLATE 5 MG: 5 TABLET ORAL at 02:08

## 2021-08-22 RX ADMIN — PANTOPRAZOLE SODIUM 40 MG: 40 TABLET, DELAYED RELEASE ORAL at 05:08

## 2021-08-22 RX ADMIN — LEVOFLOXACIN 750 MG: 5 INJECTION, SOLUTION INTRAVENOUS at 10:08

## 2021-08-22 RX ADMIN — ENOXAPARIN SODIUM 90 MG: 100 INJECTION SUBCUTANEOUS at 10:08

## 2021-08-22 RX ADMIN — OXYBUTYNIN CHLORIDE 5 MG: 5 TABLET, EXTENDED RELEASE ORAL at 02:08

## 2021-08-22 RX ADMIN — ENOXAPARIN SODIUM 90 MG: 100 INJECTION SUBCUTANEOUS at 09:08

## 2021-08-22 RX ADMIN — GUAIFENESIN AND DEXTROMETHORPHAN 10 ML: 100; 10 SYRUP ORAL at 03:08

## 2021-08-22 RX ADMIN — LACTOBACILLUS TAB 4 TABLET: TAB at 06:08

## 2021-08-22 RX ADMIN — DEXAMETHASONE 6 MG: 2 TABLET ORAL at 10:08

## 2021-08-23 LAB
ALBUMIN SERPL BCP-MCNC: 3.2 G/DL (ref 3.5–5.2)
ALP SERPL-CCNC: 74 U/L (ref 55–135)
ALT SERPL W/O P-5'-P-CCNC: 82 U/L (ref 10–44)
ANION GAP SERPL CALC-SCNC: 18 MMOL/L (ref 8–16)
AST SERPL-CCNC: 166 U/L (ref 10–40)
BASOPHILS # BLD AUTO: 0 K/UL (ref 0–0.2)
BASOPHILS NFR BLD: 0 % (ref 0–1.9)
BILIRUB SERPL-MCNC: 1.9 MG/DL (ref 0.1–1)
BUN SERPL-MCNC: 32 MG/DL (ref 8–23)
CALCIUM SERPL-MCNC: 8.9 MG/DL (ref 8.7–10.5)
CHLORIDE SERPL-SCNC: 106 MMOL/L (ref 95–110)
CO2 SERPL-SCNC: 16 MMOL/L (ref 23–29)
CREAT SERPL-MCNC: 1.2 MG/DL (ref 0.5–1.4)
D DIMER PPP IA.FEU-MCNC: 0.48 UG/ML FEU
DIFFERENTIAL METHOD: ABNORMAL
EOSINOPHIL # BLD AUTO: 0 K/UL (ref 0–0.5)
EOSINOPHIL NFR BLD: 0 % (ref 0–8)
ERYTHROCYTE [DISTWIDTH] IN BLOOD BY AUTOMATED COUNT: 14.1 % (ref 11.5–14.5)
EST. GFR  (AFRICAN AMERICAN): >60 ML/MIN/1.73 M^2
EST. GFR  (NON AFRICAN AMERICAN): 59.2 ML/MIN/1.73 M^2
FERRITIN SERPL-MCNC: 1294 NG/ML (ref 20–300)
GLUCOSE SERPL-MCNC: 266 MG/DL (ref 70–110)
GLUCOSE SERPL-MCNC: 284 MG/DL (ref 70–110)
GLUCOSE SERPL-MCNC: 353 MG/DL (ref 70–110)
GLUCOSE SERPL-MCNC: 354 MG/DL (ref 70–110)
GLUCOSE SERPL-MCNC: 362 MG/DL (ref 70–110)
HCT VFR BLD AUTO: 48.2 % (ref 40–54)
HGB BLD-MCNC: 16.3 G/DL (ref 14–18)
IMM GRANULOCYTES # BLD AUTO: 0.05 K/UL (ref 0–0.04)
IMM GRANULOCYTES NFR BLD AUTO: 1 % (ref 0–0.5)
LYMPHOCYTES # BLD AUTO: 0.4 K/UL (ref 1–4.8)
LYMPHOCYTES NFR BLD: 8.6 % (ref 18–48)
MAGNESIUM SERPL-MCNC: 2 MG/DL (ref 1.6–2.6)
MCH RBC QN AUTO: 30.9 PG (ref 27–31)
MCHC RBC AUTO-ENTMCNC: 33.8 G/DL (ref 32–36)
MCV RBC AUTO: 91 FL (ref 82–98)
MONOCYTES # BLD AUTO: 0.7 K/UL (ref 0.3–1)
MONOCYTES NFR BLD: 15.4 % (ref 4–15)
NEUTROPHILS # BLD AUTO: 3.6 K/UL (ref 1.8–7.7)
NEUTROPHILS NFR BLD: 75 % (ref 38–73)
NRBC BLD-RTO: 0 /100 WBC
PLATELET # BLD AUTO: 145 K/UL (ref 150–450)
PMV BLD AUTO: 9.3 FL (ref 9.2–12.9)
POTASSIUM SERPL-SCNC: 4.9 MMOL/L (ref 3.5–5.1)
PROT SERPL-MCNC: 6.1 G/DL (ref 6–8.4)
RBC # BLD AUTO: 5.28 M/UL (ref 4.6–6.2)
SODIUM SERPL-SCNC: 140 MMOL/L (ref 136–145)
WBC # BLD AUTO: 4.79 K/UL (ref 3.9–12.7)

## 2021-08-23 PROCEDURE — 36415 COLL VENOUS BLD VENIPUNCTURE: CPT | Performed by: INTERNAL MEDICINE

## 2021-08-23 PROCEDURE — 94761 N-INVAS EAR/PLS OXIMETRY MLT: CPT

## 2021-08-23 PROCEDURE — 63600175 PHARM REV CODE 636 W HCPCS: Performed by: INTERNAL MEDICINE

## 2021-08-23 PROCEDURE — 25000242 PHARM REV CODE 250 ALT 637 W/ HCPCS: Performed by: NURSE PRACTITIONER

## 2021-08-23 PROCEDURE — 80053 COMPREHEN METABOLIC PANEL: CPT | Performed by: NURSE PRACTITIONER

## 2021-08-23 PROCEDURE — 36415 COLL VENOUS BLD VENIPUNCTURE: CPT | Performed by: NURSE PRACTITIONER

## 2021-08-23 PROCEDURE — C9399 UNCLASSIFIED DRUGS OR BIOLOG: HCPCS | Performed by: INTERNAL MEDICINE

## 2021-08-23 PROCEDURE — 63600175 PHARM REV CODE 636 W HCPCS: Performed by: NURSE PRACTITIONER

## 2021-08-23 PROCEDURE — 25000003 PHARM REV CODE 250: Performed by: INTERNAL MEDICINE

## 2021-08-23 PROCEDURE — 99900031 HC PATIENT EDUCATION (STAT)

## 2021-08-23 PROCEDURE — 27000221 HC OXYGEN, UP TO 24 HOURS

## 2021-08-23 PROCEDURE — 83735 ASSAY OF MAGNESIUM: CPT | Performed by: NURSE PRACTITIONER

## 2021-08-23 PROCEDURE — 85025 COMPLETE CBC W/AUTO DIFF WBC: CPT | Performed by: NURSE PRACTITIONER

## 2021-08-23 PROCEDURE — 85379 FIBRIN DEGRADATION QUANT: CPT | Performed by: INTERNAL MEDICINE

## 2021-08-23 PROCEDURE — 12000002 HC ACUTE/MED SURGE SEMI-PRIVATE ROOM

## 2021-08-23 PROCEDURE — 99900035 HC TECH TIME PER 15 MIN (STAT)

## 2021-08-23 PROCEDURE — 82728 ASSAY OF FERRITIN: CPT | Performed by: INTERNAL MEDICINE

## 2021-08-23 PROCEDURE — 25000003 PHARM REV CODE 250: Performed by: NURSE PRACTITIONER

## 2021-08-23 PROCEDURE — 94799 UNLISTED PULMONARY SVC/PX: CPT

## 2021-08-23 RX ADMIN — ENOXAPARIN SODIUM 90 MG: 100 INJECTION SUBCUTANEOUS at 09:08

## 2021-08-23 RX ADMIN — REMDESIVIR 100 MG: 100 INJECTION, POWDER, LYOPHILIZED, FOR SOLUTION INTRAVENOUS at 09:08

## 2021-08-23 RX ADMIN — OXYBUTYNIN CHLORIDE 5 MG: 5 TABLET, EXTENDED RELEASE ORAL at 09:08

## 2021-08-23 RX ADMIN — HUMAN INSULIN 10 UNITS: 100 INJECTION, SOLUTION SUBCUTANEOUS at 09:08

## 2021-08-23 RX ADMIN — LEVOFLOXACIN 750 MG: 5 INJECTION, SOLUTION INTRAVENOUS at 09:08

## 2021-08-23 RX ADMIN — INSULIN DETEMIR 10 UNITS: 100 INJECTION, SOLUTION SUBCUTANEOUS at 09:08

## 2021-08-23 RX ADMIN — HUMAN INSULIN 12 UNITS: 100 INJECTION, SOLUTION SUBCUTANEOUS at 12:08

## 2021-08-23 RX ADMIN — LACTOBACILLUS TAB 4 TABLET: TAB at 05:08

## 2021-08-23 RX ADMIN — LACTOBACILLUS TAB 4 TABLET: TAB at 12:08

## 2021-08-23 RX ADMIN — ALLOPURINOL 300 MG: 100 TABLET ORAL at 09:08

## 2021-08-23 RX ADMIN — INSULIN DETEMIR 10 UNITS: 100 INJECTION, SOLUTION SUBCUTANEOUS at 11:08

## 2021-08-23 RX ADMIN — LEVOTHYROXINE SODIUM 200 MCG: 100 TABLET ORAL at 05:08

## 2021-08-23 RX ADMIN — PANTOPRAZOLE SODIUM 40 MG: 40 TABLET, DELAYED RELEASE ORAL at 05:08

## 2021-08-23 RX ADMIN — DEXAMETHASONE 6 MG: 2 TABLET ORAL at 09:08

## 2021-08-23 RX ADMIN — HUMAN INSULIN 9 UNITS: 100 INJECTION, SOLUTION SUBCUTANEOUS at 09:08

## 2021-08-23 RX ADMIN — AMLODIPINE BESYLATE 5 MG: 5 TABLET ORAL at 09:08

## 2021-08-23 RX ADMIN — LACTOBACILLUS TAB 4 TABLET: TAB at 09:08

## 2021-08-24 LAB
ALBUMIN SERPL BCP-MCNC: 3.5 G/DL (ref 3.5–5.2)
ALP SERPL-CCNC: 68 U/L (ref 55–135)
ALT SERPL W/O P-5'-P-CCNC: 75 U/L (ref 10–44)
ANION GAP SERPL CALC-SCNC: 11 MMOL/L (ref 8–16)
AST SERPL-CCNC: 109 U/L (ref 10–40)
BASOPHILS # BLD AUTO: 0.01 K/UL (ref 0–0.2)
BASOPHILS NFR BLD: 0.2 % (ref 0–1.9)
BILIRUB SERPL-MCNC: 1.7 MG/DL (ref 0.1–1)
BUN SERPL-MCNC: 34 MG/DL (ref 8–23)
CALCIUM SERPL-MCNC: 9.3 MG/DL (ref 8.7–10.5)
CHLORIDE SERPL-SCNC: 107 MMOL/L (ref 95–110)
CK SERPL-CCNC: 969 U/L (ref 20–200)
CO2 SERPL-SCNC: 21 MMOL/L (ref 23–29)
CREAT SERPL-MCNC: 1.1 MG/DL (ref 0.5–1.4)
CRP SERPL-MCNC: 4.26 MG/DL
D DIMER PPP IA.FEU-MCNC: 0.45 UG/ML FEU
DIFFERENTIAL METHOD: ABNORMAL
EOSINOPHIL # BLD AUTO: 0 K/UL (ref 0–0.5)
EOSINOPHIL NFR BLD: 0 % (ref 0–8)
ERYTHROCYTE [DISTWIDTH] IN BLOOD BY AUTOMATED COUNT: 13.8 % (ref 11.5–14.5)
EST. GFR  (AFRICAN AMERICAN): >60 ML/MIN/1.73 M^2
EST. GFR  (NON AFRICAN AMERICAN): >60 ML/MIN/1.73 M^2
GLUCOSE SERPL-MCNC: 204 MG/DL (ref 70–110)
GLUCOSE SERPL-MCNC: 263 MG/DL (ref 70–110)
GLUCOSE SERPL-MCNC: 289 MG/DL (ref 70–110)
GLUCOSE SERPL-MCNC: 322 MG/DL (ref 70–110)
GLUCOSE SERPL-MCNC: 329 MG/DL (ref 70–110)
HCT VFR BLD AUTO: 49.2 % (ref 40–54)
HGB BLD-MCNC: 16.5 G/DL (ref 14–18)
IMM GRANULOCYTES # BLD AUTO: 0.04 K/UL (ref 0–0.04)
IMM GRANULOCYTES NFR BLD AUTO: 0.6 % (ref 0–0.5)
LYMPHOCYTES # BLD AUTO: 0.4 K/UL (ref 1–4.8)
LYMPHOCYTES NFR BLD: 6.5 % (ref 18–48)
MAGNESIUM SERPL-MCNC: 2 MG/DL (ref 1.6–2.6)
MCH RBC QN AUTO: 30.4 PG (ref 27–31)
MCHC RBC AUTO-ENTMCNC: 33.5 G/DL (ref 32–36)
MCV RBC AUTO: 91 FL (ref 82–98)
MONOCYTES # BLD AUTO: 1 K/UL (ref 0.3–1)
MONOCYTES NFR BLD: 15.7 % (ref 4–15)
NEUTROPHILS # BLD AUTO: 5 K/UL (ref 1.8–7.7)
NEUTROPHILS NFR BLD: 77 % (ref 38–73)
NRBC BLD-RTO: 0 /100 WBC
PLATELET # BLD AUTO: 175 K/UL (ref 150–450)
PMV BLD AUTO: 9 FL (ref 9.2–12.9)
POTASSIUM SERPL-SCNC: 4.7 MMOL/L (ref 3.5–5.1)
PROCALCITONIN SERPL IA-MCNC: 0.15 NG/ML (ref 0–0.5)
PROT SERPL-MCNC: 6.3 G/DL (ref 6–8.4)
RBC # BLD AUTO: 5.42 M/UL (ref 4.6–6.2)
SODIUM SERPL-SCNC: 139 MMOL/L (ref 136–145)
WBC # BLD AUTO: 6.49 K/UL (ref 3.9–12.7)

## 2021-08-24 PROCEDURE — 36415 COLL VENOUS BLD VENIPUNCTURE: CPT | Performed by: INTERNAL MEDICINE

## 2021-08-24 PROCEDURE — 25000003 PHARM REV CODE 250: Performed by: FAMILY MEDICINE

## 2021-08-24 PROCEDURE — 25000003 PHARM REV CODE 250: Performed by: INTERNAL MEDICINE

## 2021-08-24 PROCEDURE — 27000221 HC OXYGEN, UP TO 24 HOURS

## 2021-08-24 PROCEDURE — 86140 C-REACTIVE PROTEIN: CPT | Performed by: INTERNAL MEDICINE

## 2021-08-24 PROCEDURE — 63600175 PHARM REV CODE 636 W HCPCS: Performed by: INTERNAL MEDICINE

## 2021-08-24 PROCEDURE — 84145 PROCALCITONIN (PCT): CPT | Performed by: INTERNAL MEDICINE

## 2021-08-24 PROCEDURE — 85379 FIBRIN DEGRADATION QUANT: CPT | Performed by: INTERNAL MEDICINE

## 2021-08-24 PROCEDURE — 99900031 HC PATIENT EDUCATION (STAT)

## 2021-08-24 PROCEDURE — 83735 ASSAY OF MAGNESIUM: CPT | Performed by: NURSE PRACTITIONER

## 2021-08-24 PROCEDURE — 99900035 HC TECH TIME PER 15 MIN (STAT)

## 2021-08-24 PROCEDURE — 63600175 PHARM REV CODE 636 W HCPCS: Performed by: NURSE PRACTITIONER

## 2021-08-24 PROCEDURE — 12000002 HC ACUTE/MED SURGE SEMI-PRIVATE ROOM

## 2021-08-24 PROCEDURE — 94761 N-INVAS EAR/PLS OXIMETRY MLT: CPT

## 2021-08-24 PROCEDURE — 85025 COMPLETE CBC W/AUTO DIFF WBC: CPT | Performed by: NURSE PRACTITIONER

## 2021-08-24 PROCEDURE — 94799 UNLISTED PULMONARY SVC/PX: CPT

## 2021-08-24 PROCEDURE — 80053 COMPREHEN METABOLIC PANEL: CPT | Performed by: NURSE PRACTITIONER

## 2021-08-24 PROCEDURE — 82550 ASSAY OF CK (CPK): CPT | Performed by: INTERNAL MEDICINE

## 2021-08-24 PROCEDURE — 25000003 PHARM REV CODE 250: Performed by: NURSE PRACTITIONER

## 2021-08-24 RX ORDER — IBUPROFEN 400 MG/1
800 TABLET ORAL EVERY 8 HOURS PRN
Status: DISCONTINUED | OUTPATIENT
Start: 2021-08-24 | End: 2021-08-26 | Stop reason: HOSPADM

## 2021-08-24 RX ORDER — HYDROCORTISONE 25 MG/G
CREAM TOPICAL 2 TIMES DAILY
Status: DISCONTINUED | OUTPATIENT
Start: 2021-08-24 | End: 2021-08-26 | Stop reason: HOSPADM

## 2021-08-24 RX ORDER — LEVOFLOXACIN 750 MG/1
750 TABLET ORAL DAILY
Status: DISCONTINUED | OUTPATIENT
Start: 2021-08-25 | End: 2021-08-26 | Stop reason: HOSPADM

## 2021-08-24 RX ORDER — LIDOCAINE 50 MG/G
1 PATCH TOPICAL
Status: DISCONTINUED | OUTPATIENT
Start: 2021-08-24 | End: 2021-08-26 | Stop reason: HOSPADM

## 2021-08-24 RX ORDER — TALC
6 POWDER (GRAM) TOPICAL NIGHTLY PRN
Status: DISCONTINUED | OUTPATIENT
Start: 2021-08-24 | End: 2021-08-26 | Stop reason: HOSPADM

## 2021-08-24 RX ADMIN — LIDOCAINE 1 PATCH: 50 PATCH TOPICAL at 09:08

## 2021-08-24 RX ADMIN — REMDESIVIR 100 MG: 100 INJECTION, POWDER, LYOPHILIZED, FOR SOLUTION INTRAVENOUS at 09:08

## 2021-08-24 RX ADMIN — ACETAMINOPHEN 650 MG: 325 TABLET, FILM COATED ORAL at 05:08

## 2021-08-24 RX ADMIN — ALLOPURINOL 300 MG: 100 TABLET ORAL at 09:08

## 2021-08-24 RX ADMIN — HUMAN INSULIN 12 UNITS: 100 INJECTION, SOLUTION SUBCUTANEOUS at 12:08

## 2021-08-24 RX ADMIN — AMLODIPINE BESYLATE 5 MG: 5 TABLET ORAL at 09:08

## 2021-08-24 RX ADMIN — HUMAN INSULIN 12 UNITS: 100 INJECTION, SOLUTION SUBCUTANEOUS at 05:08

## 2021-08-24 RX ADMIN — ENOXAPARIN SODIUM 90 MG: 100 INJECTION SUBCUTANEOUS at 09:08

## 2021-08-24 RX ADMIN — PANTOPRAZOLE SODIUM 40 MG: 40 TABLET, DELAYED RELEASE ORAL at 05:08

## 2021-08-24 RX ADMIN — LACTOBACILLUS TAB 4 TABLET: TAB at 05:08

## 2021-08-24 RX ADMIN — LACTOBACILLUS TAB 4 TABLET: TAB at 12:08

## 2021-08-24 RX ADMIN — LACTOBACILLUS TAB 4 TABLET: TAB at 09:08

## 2021-08-24 RX ADMIN — HYDROCORTISONE: 25 CREAM TOPICAL at 10:08

## 2021-08-24 RX ADMIN — INSULIN DETEMIR 10 UNITS: 100 INJECTION, SOLUTION SUBCUTANEOUS at 09:08

## 2021-08-24 RX ADMIN — OXYBUTYNIN CHLORIDE 5 MG: 5 TABLET, EXTENDED RELEASE ORAL at 09:08

## 2021-08-24 RX ADMIN — LEVOTHYROXINE SODIUM 175 MCG: 25 TABLET ORAL at 05:08

## 2021-08-24 RX ADMIN — DEXAMETHASONE 6 MG: 2 TABLET ORAL at 09:08

## 2021-08-24 RX ADMIN — LEVOFLOXACIN 750 MG: 5 INJECTION, SOLUTION INTRAVENOUS at 09:08

## 2021-08-24 RX ADMIN — HYDROCORTISONE: 25 CREAM TOPICAL at 05:08

## 2021-08-24 RX ADMIN — MELATONIN 6 MG: at 09:08

## 2021-08-24 RX ADMIN — GUAIFENESIN AND DEXTROMETHORPHAN 10 ML: 100; 10 SYRUP ORAL at 12:08

## 2021-08-25 LAB
ALBUMIN SERPL BCP-MCNC: 3.1 G/DL (ref 3.5–5.2)
ALP SERPL-CCNC: 59 U/L (ref 55–135)
ALT SERPL W/O P-5'-P-CCNC: 61 U/L (ref 10–44)
ANION GAP SERPL CALC-SCNC: 11 MMOL/L (ref 8–16)
AST SERPL-CCNC: 70 U/L (ref 10–40)
BASOPHILS # BLD AUTO: 0 K/UL (ref 0–0.2)
BASOPHILS NFR BLD: 0 % (ref 0–1.9)
BILIRUB SERPL-MCNC: 1.7 MG/DL (ref 0.1–1)
BUN SERPL-MCNC: 29 MG/DL (ref 8–23)
CALCIUM SERPL-MCNC: 8.8 MG/DL (ref 8.7–10.5)
CHLORIDE SERPL-SCNC: 103 MMOL/L (ref 95–110)
CO2 SERPL-SCNC: 21 MMOL/L (ref 23–29)
CREAT SERPL-MCNC: 1 MG/DL (ref 0.5–1.4)
CRP SERPL-MCNC: 2.43 MG/DL
D DIMER PPP IA.FEU-MCNC: 0.48 UG/ML FEU
DIFFERENTIAL METHOD: ABNORMAL
EOSINOPHIL # BLD AUTO: 0 K/UL (ref 0–0.5)
EOSINOPHIL NFR BLD: 0 % (ref 0–8)
ERYTHROCYTE [DISTWIDTH] IN BLOOD BY AUTOMATED COUNT: 13.4 % (ref 11.5–14.5)
EST. GFR  (AFRICAN AMERICAN): >60 ML/MIN/1.73 M^2
EST. GFR  (NON AFRICAN AMERICAN): >60 ML/MIN/1.73 M^2
FERRITIN SERPL-MCNC: 1247 NG/ML (ref 20–300)
GLUCOSE SERPL-MCNC: 220 MG/DL (ref 70–110)
GLUCOSE SERPL-MCNC: 257 MG/DL (ref 70–110)
GLUCOSE SERPL-MCNC: 280 MG/DL (ref 70–110)
GLUCOSE SERPL-MCNC: 298 MG/DL (ref 70–110)
GLUCOSE SERPL-MCNC: 307 MG/DL (ref 70–110)
HCT VFR BLD AUTO: 47.4 % (ref 40–54)
HGB BLD-MCNC: 16 G/DL (ref 14–18)
IMM GRANULOCYTES # BLD AUTO: 0.03 K/UL (ref 0–0.04)
IMM GRANULOCYTES NFR BLD AUTO: 0.5 % (ref 0–0.5)
LYMPHOCYTES # BLD AUTO: 0.5 K/UL (ref 1–4.8)
LYMPHOCYTES NFR BLD: 8.4 % (ref 18–48)
MAGNESIUM SERPL-MCNC: 1.7 MG/DL (ref 1.6–2.6)
MCH RBC QN AUTO: 30.7 PG (ref 27–31)
MCHC RBC AUTO-ENTMCNC: 33.8 G/DL (ref 32–36)
MCV RBC AUTO: 91 FL (ref 82–98)
MONOCYTES # BLD AUTO: 0.8 K/UL (ref 0.3–1)
MONOCYTES NFR BLD: 14 % (ref 4–15)
NEUTROPHILS # BLD AUTO: 4.4 K/UL (ref 1.8–7.7)
NEUTROPHILS NFR BLD: 77.1 % (ref 38–73)
NRBC BLD-RTO: 0 /100 WBC
PLATELET # BLD AUTO: 153 K/UL (ref 150–450)
PMV BLD AUTO: 9.6 FL (ref 9.2–12.9)
POTASSIUM SERPL-SCNC: 4.4 MMOL/L (ref 3.5–5.1)
PROT SERPL-MCNC: 5.7 G/DL (ref 6–8.4)
RBC # BLD AUTO: 5.21 M/UL (ref 4.6–6.2)
SODIUM SERPL-SCNC: 135 MMOL/L (ref 136–145)
WBC # BLD AUTO: 5.7 K/UL (ref 3.9–12.7)

## 2021-08-25 PROCEDURE — 94799 UNLISTED PULMONARY SVC/PX: CPT

## 2021-08-25 PROCEDURE — 63600175 PHARM REV CODE 636 W HCPCS: Performed by: INTERNAL MEDICINE

## 2021-08-25 PROCEDURE — 63600175 PHARM REV CODE 636 W HCPCS: Performed by: NURSE PRACTITIONER

## 2021-08-25 PROCEDURE — 99900035 HC TECH TIME PER 15 MIN (STAT)

## 2021-08-25 PROCEDURE — 27000221 HC OXYGEN, UP TO 24 HOURS

## 2021-08-25 PROCEDURE — 83735 ASSAY OF MAGNESIUM: CPT | Performed by: NURSE PRACTITIONER

## 2021-08-25 PROCEDURE — 25000003 PHARM REV CODE 250: Performed by: INTERNAL MEDICINE

## 2021-08-25 PROCEDURE — 94761 N-INVAS EAR/PLS OXIMETRY MLT: CPT

## 2021-08-25 PROCEDURE — 85379 FIBRIN DEGRADATION QUANT: CPT | Performed by: INTERNAL MEDICINE

## 2021-08-25 PROCEDURE — 82728 ASSAY OF FERRITIN: CPT | Performed by: INTERNAL MEDICINE

## 2021-08-25 PROCEDURE — 25000003 PHARM REV CODE 250: Performed by: NURSE PRACTITIONER

## 2021-08-25 PROCEDURE — 80053 COMPREHEN METABOLIC PANEL: CPT | Performed by: NURSE PRACTITIONER

## 2021-08-25 PROCEDURE — 86140 C-REACTIVE PROTEIN: CPT | Performed by: INTERNAL MEDICINE

## 2021-08-25 PROCEDURE — 85025 COMPLETE CBC W/AUTO DIFF WBC: CPT | Performed by: NURSE PRACTITIONER

## 2021-08-25 PROCEDURE — 36415 COLL VENOUS BLD VENIPUNCTURE: CPT | Performed by: INTERNAL MEDICINE

## 2021-08-25 PROCEDURE — 99900031 HC PATIENT EDUCATION (STAT)

## 2021-08-25 PROCEDURE — 12000002 HC ACUTE/MED SURGE SEMI-PRIVATE ROOM

## 2021-08-25 RX ORDER — GUAIFENESIN 600 MG/1
600 TABLET, EXTENDED RELEASE ORAL 2 TIMES DAILY
Status: DISCONTINUED | OUTPATIENT
Start: 2021-08-25 | End: 2021-08-26 | Stop reason: HOSPADM

## 2021-08-25 RX ADMIN — LACTOBACILLUS TAB 4 TABLET: TAB at 11:08

## 2021-08-25 RX ADMIN — ALLOPURINOL 300 MG: 100 TABLET ORAL at 10:08

## 2021-08-25 RX ADMIN — PANTOPRAZOLE SODIUM 40 MG: 40 TABLET, DELAYED RELEASE ORAL at 05:08

## 2021-08-25 RX ADMIN — ENOXAPARIN SODIUM 90 MG: 100 INJECTION SUBCUTANEOUS at 10:08

## 2021-08-25 RX ADMIN — LACTOBACILLUS TAB 4 TABLET: TAB at 04:08

## 2021-08-25 RX ADMIN — LEVOTHYROXINE SODIUM 200 MCG: 100 TABLET ORAL at 05:08

## 2021-08-25 RX ADMIN — GUAIFENESIN 600 MG: 600 TABLET, EXTENDED RELEASE ORAL at 09:08

## 2021-08-25 RX ADMIN — AMLODIPINE BESYLATE 5 MG: 5 TABLET ORAL at 10:08

## 2021-08-25 RX ADMIN — LIDOCAINE 1 PATCH: 50 PATCH TOPICAL at 07:08

## 2021-08-25 RX ADMIN — GUAIFENESIN AND DEXTROMETHORPHAN 10 ML: 100; 10 SYRUP ORAL at 05:08

## 2021-08-25 RX ADMIN — OXYBUTYNIN CHLORIDE 5 MG: 5 TABLET, EXTENDED RELEASE ORAL at 10:08

## 2021-08-25 RX ADMIN — LEVOFLOXACIN 750 MG: 750 TABLET, FILM COATED ORAL at 10:08

## 2021-08-25 RX ADMIN — GUAIFENESIN AND DEXTROMETHORPHAN 10 ML: 100; 10 SYRUP ORAL at 10:08

## 2021-08-25 RX ADMIN — DEXAMETHASONE 6 MG: 2 TABLET ORAL at 10:08

## 2021-08-25 RX ADMIN — REMDESIVIR 100 MG: 100 INJECTION, POWDER, LYOPHILIZED, FOR SOLUTION INTRAVENOUS at 09:08

## 2021-08-25 RX ADMIN — IBUPROFEN 800 MG: 400 TABLET, FILM COATED ORAL at 10:08

## 2021-08-25 RX ADMIN — HUMAN INSULIN 9 UNITS: 100 INJECTION, SOLUTION SUBCUTANEOUS at 12:08

## 2021-08-25 RX ADMIN — HUMAN INSULIN 12 UNITS: 100 INJECTION, SOLUTION SUBCUTANEOUS at 05:08

## 2021-08-25 RX ADMIN — LACTOBACILLUS TAB 4 TABLET: TAB at 10:08

## 2021-08-25 RX ADMIN — HYDROCORTISONE: 25 CREAM TOPICAL at 09:08

## 2021-08-25 RX ADMIN — ENOXAPARIN SODIUM 90 MG: 100 INJECTION SUBCUTANEOUS at 09:08

## 2021-08-26 VITALS
HEIGHT: 70 IN | TEMPERATURE: 98 F | BODY MASS INDEX: 28.66 KG/M2 | RESPIRATION RATE: 18 BRPM | WEIGHT: 200.19 LBS | SYSTOLIC BLOOD PRESSURE: 87 MMHG | HEART RATE: 74 BPM | DIASTOLIC BLOOD PRESSURE: 57 MMHG | OXYGEN SATURATION: 94 %

## 2021-08-26 PROBLEM — M62.82 NON-TRAUMATIC RHABDOMYOLYSIS: Status: RESOLVED | Noted: 2021-08-21 | Resolved: 2021-08-26

## 2021-08-26 PROBLEM — D69.6 THROMBOCYTOPENIA: Status: RESOLVED | Noted: 2021-08-22 | Resolved: 2021-08-26

## 2021-08-26 PROBLEM — N17.9 AKI (ACUTE KIDNEY INJURY): Status: RESOLVED | Noted: 2021-08-21 | Resolved: 2021-08-26

## 2021-08-26 LAB
ALBUMIN SERPL BCP-MCNC: 3.3 G/DL (ref 3.5–5.2)
ALP SERPL-CCNC: 60 U/L (ref 55–135)
ALT SERPL W/O P-5'-P-CCNC: 51 U/L (ref 10–44)
ANION GAP SERPL CALC-SCNC: 11 MMOL/L (ref 8–16)
AST SERPL-CCNC: 51 U/L (ref 10–40)
BACTERIA BLD CULT: NORMAL
BACTERIA BLD CULT: NORMAL
BASOPHILS # BLD AUTO: 0.01 K/UL (ref 0–0.2)
BASOPHILS NFR BLD: 0.2 % (ref 0–1.9)
BILIRUB SERPL-MCNC: 2.2 MG/DL (ref 0.1–1)
BUN SERPL-MCNC: 26 MG/DL (ref 8–23)
CALCIUM SERPL-MCNC: 9.2 MG/DL (ref 8.7–10.5)
CHLORIDE SERPL-SCNC: 101 MMOL/L (ref 95–110)
CO2 SERPL-SCNC: 26 MMOL/L (ref 23–29)
CREAT SERPL-MCNC: 1.1 MG/DL (ref 0.5–1.4)
CRP SERPL-MCNC: 1.8 MG/DL
D DIMER PPP IA.FEU-MCNC: 0.63 UG/ML FEU
DIFFERENTIAL METHOD: ABNORMAL
EOSINOPHIL # BLD AUTO: 0 K/UL (ref 0–0.5)
EOSINOPHIL NFR BLD: 0.2 % (ref 0–8)
ERYTHROCYTE [DISTWIDTH] IN BLOOD BY AUTOMATED COUNT: 13.2 % (ref 11.5–14.5)
EST. GFR  (AFRICAN AMERICAN): >60 ML/MIN/1.73 M^2
EST. GFR  (NON AFRICAN AMERICAN): >60 ML/MIN/1.73 M^2
FERRITIN SERPL-MCNC: 1127 NG/ML (ref 20–300)
GLUCOSE SERPL-MCNC: 213 MG/DL (ref 70–110)
GLUCOSE SERPL-MCNC: 220 MG/DL (ref 70–110)
GLUCOSE SERPL-MCNC: 226 MG/DL (ref 70–110)
HCT VFR BLD AUTO: 50 % (ref 40–54)
HGB BLD-MCNC: 17.2 G/DL (ref 14–18)
IMM GRANULOCYTES # BLD AUTO: 0.08 K/UL (ref 0–0.04)
IMM GRANULOCYTES NFR BLD AUTO: 1.4 % (ref 0–0.5)
LYMPHOCYTES # BLD AUTO: 0.4 K/UL (ref 1–4.8)
LYMPHOCYTES NFR BLD: 7.6 % (ref 18–48)
MAGNESIUM SERPL-MCNC: 1.8 MG/DL (ref 1.6–2.6)
MCH RBC QN AUTO: 31.3 PG (ref 27–31)
MCHC RBC AUTO-ENTMCNC: 34.4 G/DL (ref 32–36)
MCV RBC AUTO: 91 FL (ref 82–98)
MONOCYTES # BLD AUTO: 0.8 K/UL (ref 0.3–1)
MONOCYTES NFR BLD: 13.5 % (ref 4–15)
NEUTROPHILS # BLD AUTO: 4.5 K/UL (ref 1.8–7.7)
NEUTROPHILS NFR BLD: 77.1 % (ref 38–73)
NRBC BLD-RTO: 0 /100 WBC
PLATELET # BLD AUTO: 166 K/UL (ref 150–450)
PMV BLD AUTO: 9.6 FL (ref 9.2–12.9)
POTASSIUM SERPL-SCNC: 4.8 MMOL/L (ref 3.5–5.1)
PROT SERPL-MCNC: 6 G/DL (ref 6–8.4)
RBC # BLD AUTO: 5.49 M/UL (ref 4.6–6.2)
SODIUM SERPL-SCNC: 138 MMOL/L (ref 136–145)
WBC # BLD AUTO: 5.77 K/UL (ref 3.9–12.7)

## 2021-08-26 PROCEDURE — 27000221 HC OXYGEN, UP TO 24 HOURS

## 2021-08-26 PROCEDURE — 25000003 PHARM REV CODE 250: Performed by: NURSE PRACTITIONER

## 2021-08-26 PROCEDURE — 80053 COMPREHEN METABOLIC PANEL: CPT | Performed by: NURSE PRACTITIONER

## 2021-08-26 PROCEDURE — 99900035 HC TECH TIME PER 15 MIN (STAT)

## 2021-08-26 PROCEDURE — 82728 ASSAY OF FERRITIN: CPT | Performed by: INTERNAL MEDICINE

## 2021-08-26 PROCEDURE — 25000003 PHARM REV CODE 250: Performed by: INTERNAL MEDICINE

## 2021-08-26 PROCEDURE — 94761 N-INVAS EAR/PLS OXIMETRY MLT: CPT

## 2021-08-26 PROCEDURE — 86140 C-REACTIVE PROTEIN: CPT | Performed by: INTERNAL MEDICINE

## 2021-08-26 PROCEDURE — 63600175 PHARM REV CODE 636 W HCPCS: Performed by: INTERNAL MEDICINE

## 2021-08-26 PROCEDURE — 63600175 PHARM REV CODE 636 W HCPCS: Performed by: NURSE PRACTITIONER

## 2021-08-26 PROCEDURE — 25000003 PHARM REV CODE 250: Performed by: FAMILY MEDICINE

## 2021-08-26 PROCEDURE — 83735 ASSAY OF MAGNESIUM: CPT | Performed by: NURSE PRACTITIONER

## 2021-08-26 PROCEDURE — 85379 FIBRIN DEGRADATION QUANT: CPT | Performed by: INTERNAL MEDICINE

## 2021-08-26 PROCEDURE — 85025 COMPLETE CBC W/AUTO DIFF WBC: CPT | Performed by: NURSE PRACTITIONER

## 2021-08-26 PROCEDURE — 36415 COLL VENOUS BLD VENIPUNCTURE: CPT | Performed by: INTERNAL MEDICINE

## 2021-08-26 RX ORDER — DEXAMETHASONE 6 MG/1
6 TABLET ORAL DAILY
Qty: 4 TABLET | Refills: 0 | Status: SHIPPED | OUTPATIENT
Start: 2021-08-27 | End: 2021-08-31

## 2021-08-26 RX ORDER — INSULIN DETEMIR 100 [IU]/ML
20 INJECTION, SOLUTION SUBCUTANEOUS 2 TIMES DAILY
Qty: 12 ML | Refills: 1 | Status: SHIPPED | OUTPATIENT
Start: 2021-08-26 | End: 2023-11-06 | Stop reason: CLARIF

## 2021-08-26 RX ADMIN — ENOXAPARIN SODIUM 90 MG: 100 INJECTION SUBCUTANEOUS at 08:08

## 2021-08-26 RX ADMIN — LACTOBACILLUS TAB 4 TABLET: TAB at 08:08

## 2021-08-26 RX ADMIN — HYDROCORTISONE: 25 CREAM TOPICAL at 09:08

## 2021-08-26 RX ADMIN — ALLOPURINOL 300 MG: 100 TABLET ORAL at 08:08

## 2021-08-26 RX ADMIN — LEVOFLOXACIN 750 MG: 750 TABLET, FILM COATED ORAL at 08:08

## 2021-08-26 RX ADMIN — GUAIFENESIN 600 MG: 600 TABLET, EXTENDED RELEASE ORAL at 08:08

## 2021-08-26 RX ADMIN — LEVOTHYROXINE SODIUM 175 MCG: 25 TABLET ORAL at 03:08

## 2021-08-26 RX ADMIN — DEXAMETHASONE 6 MG: 2 TABLET ORAL at 08:08

## 2021-08-26 RX ADMIN — AMLODIPINE BESYLATE 5 MG: 5 TABLET ORAL at 08:08

## 2021-08-26 RX ADMIN — HUMAN INSULIN 6 UNITS: 100 INJECTION, SOLUTION SUBCUTANEOUS at 09:08

## 2021-08-26 RX ADMIN — HUMAN INSULIN 3 UNITS: 100 INJECTION, SOLUTION SUBCUTANEOUS at 03:08

## 2021-08-26 RX ADMIN — LACTOBACILLUS TAB 4 TABLET: TAB at 03:08

## 2021-08-26 RX ADMIN — PANTOPRAZOLE SODIUM 40 MG: 40 TABLET, DELAYED RELEASE ORAL at 05:08

## 2021-08-26 RX ADMIN — OXYBUTYNIN CHLORIDE 5 MG: 5 TABLET, EXTENDED RELEASE ORAL at 08:08

## 2023-10-24 ENCOUNTER — OFFICE VISIT (OUTPATIENT)
Dept: SURGERY | Facility: CLINIC | Age: 78
End: 2023-10-24
Payer: MEDICARE

## 2023-10-24 VITALS — HEART RATE: 69 BPM | DIASTOLIC BLOOD PRESSURE: 75 MMHG | SYSTOLIC BLOOD PRESSURE: 126 MMHG | TEMPERATURE: 98 F

## 2023-10-24 DIAGNOSIS — K40.20 NON-RECURRENT BILATERAL INGUINAL HERNIA WITHOUT OBSTRUCTION OR GANGRENE: Primary | ICD-10-CM

## 2023-10-24 PROCEDURE — 3074F SYST BP LT 130 MM HG: CPT | Mod: CPTII,S$GLB,, | Performed by: SURGERY

## 2023-10-24 PROCEDURE — 3288F PR FALLS RISK ASSESSMENT DOCUMENTED: ICD-10-PCS | Mod: CPTII,S$GLB,, | Performed by: SURGERY

## 2023-10-24 PROCEDURE — 3288F FALL RISK ASSESSMENT DOCD: CPT | Mod: CPTII,S$GLB,, | Performed by: SURGERY

## 2023-10-24 PROCEDURE — 3078F DIAST BP <80 MM HG: CPT | Mod: CPTII,S$GLB,, | Performed by: SURGERY

## 2023-10-24 PROCEDURE — 1101F PR PT FALLS ASSESS DOC 0-1 FALLS W/OUT INJ PAST YR: ICD-10-PCS | Mod: CPTII,S$GLB,, | Performed by: SURGERY

## 2023-10-24 PROCEDURE — 1125F AMNT PAIN NOTED PAIN PRSNT: CPT | Mod: CPTII,S$GLB,, | Performed by: SURGERY

## 2023-10-24 PROCEDURE — 99204 OFFICE O/P NEW MOD 45 MIN: CPT | Mod: S$GLB,,, | Performed by: SURGERY

## 2023-10-24 PROCEDURE — 3074F PR MOST RECENT SYSTOLIC BLOOD PRESSURE < 130 MM HG: ICD-10-PCS | Mod: CPTII,S$GLB,, | Performed by: SURGERY

## 2023-10-24 PROCEDURE — 1159F MED LIST DOCD IN RCRD: CPT | Mod: CPTII,S$GLB,, | Performed by: SURGERY

## 2023-10-24 PROCEDURE — 1160F PR REVIEW ALL MEDS BY PRESCRIBER/CLIN PHARMACIST DOCUMENTED: ICD-10-PCS | Mod: CPTII,S$GLB,, | Performed by: SURGERY

## 2023-10-24 PROCEDURE — 1160F RVW MEDS BY RX/DR IN RCRD: CPT | Mod: CPTII,S$GLB,, | Performed by: SURGERY

## 2023-10-24 PROCEDURE — 1101F PT FALLS ASSESS-DOCD LE1/YR: CPT | Mod: CPTII,S$GLB,, | Performed by: SURGERY

## 2023-10-24 PROCEDURE — 1159F PR MEDICATION LIST DOCUMENTED IN MEDICAL RECORD: ICD-10-PCS | Mod: CPTII,S$GLB,, | Performed by: SURGERY

## 2023-10-24 PROCEDURE — 99204 PR OFFICE/OUTPT VISIT, NEW, LEVL IV, 45-59 MIN: ICD-10-PCS | Mod: S$GLB,,, | Performed by: SURGERY

## 2023-10-24 PROCEDURE — 3078F PR MOST RECENT DIASTOLIC BLOOD PRESSURE < 80 MM HG: ICD-10-PCS | Mod: CPTII,S$GLB,, | Performed by: SURGERY

## 2023-10-24 PROCEDURE — 1125F PR PAIN SEVERITY QUANTIFIED, PAIN PRESENT: ICD-10-PCS | Mod: CPTII,S$GLB,, | Performed by: SURGERY

## 2023-10-24 NOTE — H&P (VIEW-ONLY)
Subjective:       Patient ID: Mauri Archer Jr. is a 78 y.o. male.    Chief Complaint: Hernia (inguinal)      HPI:  78 year old male presented with bilateral inguinal hernia. No obstructive symptoms. Starting to be more uncomfortable. No history of prior inguinal hernia repair. He had umbilical hernia repair with mesh several years ago.     Past Medical History:   Diagnosis Date    COVID-19     Diabetes mellitus     GERD (gastroesophageal reflux disease)     Gout     [place] (hard of hearing)     BILAT AIDS    Hyperlipidemia     Hypertension     Kidney stone     Melanoma     Sleep apnea     CPAP  1    Thyroid cancer     Thyroid disease     Ulcer     Wears glasses      Past Surgical History:   Procedure Laterality Date    CATARACT EXTRACTION      ELBOW SURGERY  1973    Rt    HERNIA REPAIR  1999    LASER OF PROSTATE W/ GREEN LIGHT PVP  04/18/2017    Dr. Arevalo-[facility] [place]    LITHOTRIPSY      TOTAL THYROIDECTOMY  8/18/2014    bilateral-[facility]     Review of patient's allergies indicates:   Allergen Reactions    Morphine      Medication List with Changes/Refills   Current Medications    ALLOPURINOL (ZYLOPRIM) 300 MG TABLET    Take 300 mg by mouth once daily.    AMLODIPINE (NORVASC) 5 MG TABLET    Take 5 mg by mouth once daily.    AMOXICILLIN (AMOXIL) 500 MG CAPSULE    TAKE 1 CAPSULE BY MOUTH 3 TIMES A DAY UNTIL GONE    AZELASTINE-FLUTICASONE (DYMISTA) 137-50 MCG/SPRAY SPRY NASSAL SPRAY    1 spray by Nasal route 2 (two) times daily.     CHLORHEXIDINE (PERIDEX) 0.12 % SOLUTION    Use as directed 15 mLs in the mouth or throat 2 (two) times daily.     COENZYME Q10 200 MG CAPSULE    Take by mouth.    DEXILANT 60 MG CAPSULE    Take 60 mg by mouth once daily.     EMPAGLIFLOZIN-METFORMIN (SYNJARDY XR) 5-1,000 MG TBPH    Take 1 tablet by mouth 2 (two) times daily.    ESOMEPRAZOLE (NEXIUM) 40 MG CAPSULE    Take 40 mg by mouth once daily.    FLUCONAZOLE (DIFLUCAN) 150 MG TAB    1 po q 72 hts    FLUVASTATIN (LESCOL) 40 MG CAPSULE    Take 80 mg  by mouth.    FREESTYLE LITE STRIPS STRP        GLIMEPIRIDE (AMARYL) 4 MG TABLET    Take 4 mg by mouth daily with breakfast.     HYDROCODONE-ACETAMINOPHEN (NORCO) 5-325 MG PER TABLET    Take 1 tablet by mouth every 4 to 6 hours as needed.    IBUPROFEN (ADVIL,MOTRIN) 800 MG TABLET    Take 800 mg by mouth every 6 (six) hours as needed.    INSULIN DETEMIR U-100 (LEVEMIR FLEXTOUCH U-100 INSULN) 100 UNIT/ML (3 ML) INPN PEN    Inject 20 Units into the skin 2 (two) times daily.    INSULIN GLARGINE-LIXISENATIDE (SOLIQUA 100/33) 100 UNIT-33 MCG/ML INPN PEN    Inject into the skin.    JANUMET -1,000 MG TM24        LEVOTHYROXINE (SYNTHROID) 200 MCG TABLET    Take 200 mcg by mouth every Mon, Wed, Fri.    MECOBAL-LEVOMEFOLAT CA-B6 PHOS (FOLTANX) 3-35-2 MG TAB    Take 1 tablet by mouth once daily.    MELATONIN 10 MG CAP    Take 1 capsule by mouth every evening.    NYSTATIN (MYCOSTATIN) OINTMENT    Apply topically 2 (two) times daily.    ONETOUCH DELICA PLUS LANCET 30 GAUGE MISC    USE TO MONITOR GLUCOSE TWICE DAILY (NEED MEDICARE B INFO)    OXYBUTYNIN (DITROPAN-XL) 5 MG TR24    oxybutynin chloride ER 5 mg tablet,extended release 24 hr    RAMIPRIL (ALTACE) 5 MG CAPSULE    ramipril 5 mg capsule    SYNTHROID 175 MCG TABLET    Take 175 mcg by mouth every Tues, Thurs.     TADALAFIL (CIALIS) 5 MG TABLET    Take 1 tablet (5 mg total) by mouth once daily.    TOUJEO MAX U-300 SOLOSTAR 300 UNIT/ML (3 ML) INSULIN PEN        ZOLPIDEM (AMBIEN) 10 MG TAB    Take 10 mg by mouth nightly as needed.      History reviewed. No pertinent family history.  Social History     Socioeconomic History    Marital status:    Tobacco Use    Smoking status: Former     Current packs/day: 0.00     Average packs/day: 1 pack/day for 10.0 years (10.0 ttl pk-yrs)     Types: Cigarettes     Start date: 1970     Quit date: 1980     Years since quittin.1    Smokeless tobacco: Former     Quit date: 1981   Substance and Sexual Activity     Alcohol use: Yes     Comment: 1 beer a month    Drug use: No         Review of Systems   Constitutional:  Negative for appetite change, chills, fever and unexpected weight change.   HENT:  Negative for hearing loss, rhinorrhea, sore throat and voice change.    Eyes:  Negative for photophobia and visual disturbance.   Respiratory:  Negative for cough, choking and shortness of breath.    Cardiovascular:  Negative for chest pain, palpitations and leg swelling.   Gastrointestinal:  Negative for abdominal pain, blood in stool, constipation, diarrhea, nausea and vomiting.   Endocrine: Negative for cold intolerance, heat intolerance, polydipsia and polyuria.   Musculoskeletal:  Negative for arthralgias, back pain, joint swelling and neck stiffness.   Skin:  Negative for color change, pallor and rash.   Neurological:  Negative for dizziness, seizures, syncope and headaches.   Hematological:  Negative for adenopathy. Does not bruise/bleed easily.   Psychiatric/Behavioral:  Negative for agitation, behavioral problems and confusion.        Objective:      Physical Exam  Constitutional:       General: He is awake. He is not in acute distress.     Appearance: He is not toxic-appearing.   HENT:      Head: Normocephalic and atraumatic.   Pulmonary:      Effort: No tachypnea, bradypnea, accessory muscle usage or respiratory distress.   Abdominal:      General: There is no distension.      Palpations: Abdomen is soft.      Tenderness: There is no abdominal tenderness.      Hernia: A hernia is present. Hernia is present in the left inguinal area and right inguinal area.          Comments: Moderate size bilateral inguinal hernia.    Musculoskeletal:      Cervical back: Neck supple.   Neurological:      Mental Status: He is alert and oriented to person, place, and time.   Psychiatric:         Behavior: Behavior is cooperative.         Assessment/Plan:   Mauri was seen today for hernia.    Diagnoses and all orders for this  visit:    Non-recurrent bilateral inguinal hernia without obstruction or gangrene  -     Case Request Operating Room: ROBOTIC REPAIR, HERNIA, INGUINAL  -     Basic metabolic panel; Future  -     CBC auto differential; Future  -     EKG 12-lead; Future  -     X-Ray Chest PA And Lateral; Future    Bilateral inguinal hernia without obstruction or gangrene    Other orders  -     Full code; Standing  -     Vital signs; Standing  -     Insert peripheral IV; Standing  -     Diet NPO; Standing  -     IP VTE LOW RISK PATIENT; Standing  -     Place sequential compression device; Standing  -     ceFAZolin (ANCEF) 2 g in dextrose 5 % (D5W) 50 mL IVPB  -     Pulse Oximetry Q4H; Standing  -     Place in Outpatient; Standing  -     Full code  -     Insert peripheral IV    Plan on robotic bilateral inguinal hernia repair Nov 20.     I discussed the proposed procedures with the patient including risks, benefits, indications, alternatives and special concerns.  The patient appears to understand and agrees to go ahead with surgery.  I have made no promises, warranties or verbal agreements beyond what was discussed above.

## 2023-10-24 NOTE — PROGRESS NOTES
Subjective:       Patient ID: Mauri Archer Jr. is a 78 y.o. male.    Chief Complaint: Hernia (inguinal)      HPI:  78 year old male presented with bilateral inguinal hernia. No obstructive symptoms. Starting to be more uncomfortable. No history of prior inguinal hernia repair. He had umbilical hernia repair with mesh several years ago.     Past Medical History:   Diagnosis Date    COVID-19     Diabetes mellitus     GERD (gastroesophageal reflux disease)     Gout     [place] (hard of hearing)     BILAT AIDS    Hyperlipidemia     Hypertension     Kidney stone     Melanoma     Sleep apnea     CPAP  1    Thyroid cancer     Thyroid disease     Ulcer     Wears glasses      Past Surgical History:   Procedure Laterality Date    CATARACT EXTRACTION      ELBOW SURGERY  1973    Rt    HERNIA REPAIR  1999    [place]ER OF PROSTATE W/ GREEN LIGHT PVP  04[place]18[place]2017    [place] [place]      TOTAL THYROIDECTOMY  8/18/2014    bilateral-[facility]     Review of patient's allergies indicates:   Allergen Reactions    Morphine      Medication List with Changes/Refills   Current Medications    ALLOPURINOL (ZYLOPRIM) 300 MG TABLET    Take 300 mg by mouth once daily.    AMLODIPINE (NORVASC) 5 MG TABLET    Take 5 mg by mouth once daily.    AMOXICILLIN (AMOXIL) 500 MG CAPSULE    TAKE 1 CAPSULE BY MOUTH 3 TIMES A DAY UNTIL GONE    AZELASTINE-FLUTICASONE (DYMISTA) 137-50 MCG/SPRAY SPRY NASSAL SPRAY    1 spray by Nasal route 2 (two) times daily.     CHLORHEXIDINE (PERIDEX) 0.12 % SOLUTION    Use as directed 15 mLs in the mouth or throat 2 (two) times daily.     COENZYME Q10 200 MG CAPSULE    Take by mouth.    DEXILANT 60 MG CAPSULE    Take 60 mg by mouth once daily.     EMPAGLIFLOZIN-METFORMIN (SYNJARDY XR) 5-1,000 MG TBPH    Take 1 tablet by mouth 2 (two) times daily.    ESOMEPRAZOLE (NEXIUM) 40 MG CAPSULE    Take 40 mg by mouth once daily.    FLUCONAZOLE (DIFLUCAN) 150 MG TAB    1 po q 72 hts    FLUVASTATIN (LESCOL) 40 MG CAPSULE    Take 80 mg  by mouth.    FREESTYLE LITE STRIPS STRP        GLIMEPIRIDE (AMARYL) 4 MG TABLET    Take 4 mg by mouth daily with breakfast.     HYDROCODONE-ACETAMINOPHEN (NORCO) 5-325 MG PER TABLET    Take 1 tablet by mouth every 4 to 6 hours as needed.    IBUPROFEN (ADVIL,MOTRIN) 800 MG TABLET    Take 800 mg by mouth every 6 (six) hours as needed.    INSULIN DETEMIR U-100 (LEVEMIR FLEXTOUCH U-100 INSULN) 100 UNIT/ML (3 ML) INPN PEN    Inject 20 Units into the skin 2 (two) times daily.    INSULIN GLARGINE-LIXISENATIDE (SOLIQUA 100/33) 100 UNIT-33 MCG/ML INPN PEN    Inject into the skin.    JANUMET -1,000 MG TM24        LEVOTHYROXINE (SYNTHROID) 200 MCG TABLET    Take 200 mcg by mouth every Mon, Wed, Fri.    MECOBAL-LEVOMEFOLAT CA-B6 PHOS (FOLTANX) 3-35-2 MG TAB    Take 1 tablet by mouth once daily.    MELATONIN 10 MG CAP    Take 1 capsule by mouth every evening.    NYSTATIN (MYCOSTATIN) OINTMENT    Apply topically 2 (two) times daily.    ONETOUCH DELICA PLUS LANCET 30 GAUGE MISC    USE TO MONITOR GLUCOSE TWICE DAILY (NEED MEDICARE B INFO)    OXYBUTYNIN (DITROPAN-XL) 5 MG TR24    oxybutynin chloride ER 5 mg tablet,extended release 24 hr    RAMIPRIL (ALTACE) 5 MG CAPSULE    ramipril 5 mg capsule    SYNTHROID 175 MCG TABLET    Take 175 mcg by mouth every Tues, Thurs.     TADALAFIL (CIALIS) 5 MG TABLET    Take 1 tablet (5 mg total) by mouth once daily.    TOUJEO MAX U-300 SOLOSTAR 300 UNIT/ML (3 ML) INSULIN PEN        ZOLPIDEM (AMBIEN) 10 MG TAB    Take 10 mg by mouth nightly as needed.      History reviewed. No pertinent family history.  Social History     Socioeconomic History    Marital status:    Tobacco Use    Smoking status: Former     Current packs/day: 0.00     Average packs/day: 1 pack/day for 10.0 years (10.0 ttl pk-yrs)     Types: Cigarettes     Start date: 1970     Quit date: 1980     Years since quittin.1    Smokeless tobacco: Former     Quit date: 1981   Substance and Sexual Activity     Alcohol use: Yes     Comment: 1 beer a month    Drug use: No         Review of Systems   Constitutional:  Negative for appetite change, chills, fever and unexpected weight change.   HENT:  Negative for hearing loss, rhinorrhea, sore throat and voice change.    Eyes:  Negative for photophobia and visual disturbance.   Respiratory:  Negative for cough, choking and shortness of breath.    Cardiovascular:  Negative for chest pain, palpitations and leg swelling.   Gastrointestinal:  Negative for abdominal pain, blood in stool, constipation, diarrhea, nausea and vomiting.   Endocrine: Negative for cold intolerance, heat intolerance, polydipsia and polyuria.   Musculoskeletal:  Negative for arthralgias, back pain, joint swelling and neck stiffness.   Skin:  Negative for color change, pallor and rash.   Neurological:  Negative for dizziness, seizures, syncope and headaches.   Hematological:  Negative for adenopathy. Does not bruise/bleed easily.   Psychiatric/Behavioral:  Negative for agitation, behavioral problems and confusion.        Objective:      Physical Exam  Constitutional:       General: He is awake. He is not in acute distress.     Appearance: He is not toxic-appearing.   HENT:      Head: Normocephalic and atraumatic.   Pulmonary:      Effort: No tachypnea, bradypnea, accessory muscle usage or respiratory distress.   Abdominal:      General: There is no distension.      Palpations: Abdomen is soft.      Tenderness: There is no abdominal tenderness.      Hernia: A hernia is present. Hernia is present in the left inguinal area and right inguinal area.          Comments: Moderate size bilateral inguinal hernia.    Musculoskeletal:      Cervical back: Neck supple.   Neurological:      Mental Status: He is alert and oriented to person, place, and time.   Psychiatric:         Behavior: Behavior is cooperative.         Assessment/Plan:   Mauri was seen today for hernia.    Diagnoses and all orders for this  visit:    Non-recurrent bilateral inguinal hernia without obstruction or gangrene  -     Case Request Operating Room: ROBOTIC REPAIR, HERNIA, INGUINAL  -     Basic metabolic panel; Future  -     CBC auto differential; Future  -     EKG 12-lead; Future  -     X-Ray Chest PA And Lateral; Future    Bilateral inguinal hernia without obstruction or gangrene    Other orders  -     Full code; Standing  -     Vital signs; Standing  -     Insert peripheral IV; Standing  -     Diet NPO; Standing  -     IP VTE LOW RISK PATIENT; Standing  -     Place sequential compression device; Standing  -     ceFAZolin (ANCEF) 2 g in dextrose 5 % (D5W) 50 mL IVPB  -     Pulse Oximetry Q4H; Standing  -     Place in Outpatient; Standing  -     Full code  -     Insert peripheral IV    Plan on robotic bilateral inguinal hernia repair Nov 20.     I discussed the proposed procedures with the patient including risks, benefits, indications, alternatives and special concerns.  The patient appears to understand and agrees to go ahead with surgery.  I have made no promises, warranties or verbal agreements beyond what was discussed above.

## 2023-10-27 ENCOUNTER — TELEPHONE (OUTPATIENT)
Dept: SURGERY | Facility: CLINIC | Age: 78
End: 2023-10-27
Payer: MEDICARE

## 2023-10-27 NOTE — TELEPHONE ENCOUNTER
Called patient to confirm his surgery date of 1/20/23 for Robotic Luis Inguinal hernia repair.  Patient confirmed and a preadmit appt and post op were also scheduled for patient as well.

## 2023-10-27 NOTE — TELEPHONE ENCOUNTER
----- Message from Michelle Becker, Patient Care Assistant sent at 10/27/2023 11:19 AM CDT -----  Contact: Arline wife  Arline is ret a call [phone]  thanks

## 2023-11-06 ENCOUNTER — HOSPITAL ENCOUNTER (OUTPATIENT)
Dept: RADIOLOGY | Facility: HOSPITAL | Age: 78
Discharge: HOME | End: 2023-11-06
Attending: SURGERY
Payer: MEDICARE

## 2023-11-06 ENCOUNTER — HOSPITAL ENCOUNTER (OUTPATIENT)
Dept: PREADMISSION TESTING | Facility: HOSPITAL | Age: 78
Discharge: HOME | End: 2023-11-06
Attending: SURGERY
Payer: MEDICARE

## 2023-11-06 VITALS — HEIGHT: 70 IN | WEIGHT: 200 LBS | BODY MASS INDEX: 28.63 KG/M2

## 2023-11-06 DIAGNOSIS — Z01.818 PREOP TESTING: Primary | ICD-10-CM

## 2023-11-06 DIAGNOSIS — K40.20 NON-RECURRENT BILATERAL INGUINAL HERNIA WITHOUT OBSTRUCTION OR GANGRENE: ICD-10-CM

## 2023-11-06 PROCEDURE — 93010 EKG 12-LEAD: ICD-10-PCS | Mod: ,,, | Performed by: INTERNAL MEDICINE

## 2023-11-06 PROCEDURE — 71046 X-RAY EXAM CHEST 2 VIEWS: CPT | Mod: TC

## 2023-11-06 PROCEDURE — 71046 XR CHEST PA AND LATERAL: ICD-10-PCS | Mod: 26,,, | Performed by: RADIOLOGY

## 2023-11-06 PROCEDURE — 93005 ELECTROCARDIOGRAM TRACING: CPT

## 2023-11-06 PROCEDURE — 93010 ELECTROCARDIOGRAM REPORT: CPT | Mod: ,,, | Performed by: INTERNAL MEDICINE

## 2023-11-06 PROCEDURE — 71046 X-RAY EXAM CHEST 2 VIEWS: CPT | Mod: 26,,, | Performed by: RADIOLOGY

## 2023-11-06 RX ORDER — EZETIMIBE 10 MG/1
10 TABLET ORAL NIGHTLY
COMMUNITY

## 2023-11-06 RX ORDER — GABAPENTIN 300 MG/1
300 CAPSULE ORAL NIGHTLY
COMMUNITY

## 2023-11-06 RX ORDER — TIRZEPATIDE 5 MG/.5ML
5 INJECTION, SOLUTION SUBCUTANEOUS
COMMUNITY
End: 2024-01-16

## 2023-11-06 NOTE — DISCHARGE INSTRUCTIONS
To confirm, Your doctor has instructed you that surgery is scheduled for: 11/20/23    Please report to [facility], Registration the morning of surgery. You must check-in and receive a wristband before going to your procedure.  [address] [place] [address] LISETH MARRERO [id number]    Pre-Op will call the afternoon prior to surgery between 1:00 and 6:00 PM with the final arrival time.  Phone number: [phone]    PLEASE NOTE:  The surgery schedule has many variables which may affect the time of your surgery case.  Family members should be available if your surgery time changes.  Plan to be here the day of your procedure between 4-6 hours.    MEDICATIONS:  TAKE ONLY THESE MEDICATIONS WITH A SMALL SIP OF WATER THE MORNING OF YOUR PROCEDURE:    SEE LIST    DO NOT TAKE THESE MEDICATIONS 5-7 DAYS PRIOR to your procedure or per your surgeon's request:   ASPIRIN, ALEVE, ADVIL, IBUPROFEN, FISH OIL VITAMIN E, HERBALS    (May take Tylenol)    ONLY if you are prescribed any types of blood thinners such as:  Aspirin, Coumadin, Plavix, Pradaxa, Xarelto, Aggrenox, Effient, Eliquis, Savasya, Brilinta, or any other, ask your surgeon whether you should stop taking them and how long before surgery you should stop.  You may also need to verify with the prescribing physician if it is ok to stop your medication.      INSTRUCTIONS IMPORTANT!!  Do not eat or drink anything between midnight and the time of your procedure- this includes gum, mints, and candy.  Do not smoke or drink alcoholic beverages 24 hours prior to your procedure.  Shower the night before AND the morning of your procedure with a Chlorhexidine wash such as Hibiclens or Dial antibacterial soap from the neck down.  Do not get it on your face or in your eyes.  You may use your own shampoo and face wash. This helps your skin to be as bacteria free as possible.    If you wear contact lenses, dentures, hearing aids or glasses, bring a container to put them in during  surgery and give to a family member for safe keeping.  Please leave all jewelry, piercing's and valuables at home. You must remove your false eyelashes prior to surgery.    DO NOT remove hair from the surgery site.  Do not shave the incision site unless you are given specific instructions to do so.    ONLY if you have been diagnosed with sleep apnea please bring your C-PAP machine.  ONLY if you wear home oxygen please bring your portable oxygen tank the day of your procedure.  ONLY if you have a history of OPEN HEART SURGERY you will need a clearance from your Cardiologist per Anesthesia.      ONLY for patients requiring bowel prep, written instructions will be given by your doctor's office.  ONLY if you have a neuro stimulator, please bring the controller with you the morning of surgery  ONLY if a type and screen test is needed before surgery, please return:  If your doctor has scheduled you for an overnight stay, bring a small overnight bag with any personal items you need.  Make arrangements in advance for transportation home by a responsible adult.  It is not safe to drive a vehicle during the 24 hours after anesthesia.          All  facilities and properties are tobacco free.  Smoking is NOT allowed.   If you have any questions about these instructions, call Pre-Op Admit  Nursing at [phone] or the Pre-Op Day Surgery Unit at [phone].

## 2023-11-18 ENCOUNTER — ANESTHESIA EVENT (OUTPATIENT)
Dept: SURGERY | Facility: HOSPITAL | Age: 78
End: 2023-11-18
Payer: MEDICARE

## 2023-11-20 ENCOUNTER — ANESTHESIA (OUTPATIENT)
Dept: SURGERY | Facility: HOSPITAL | Age: 78
End: 2023-11-20
Payer: MEDICARE

## 2023-11-20 ENCOUNTER — HOSPITAL ENCOUNTER (OUTPATIENT)
Facility: HOSPITAL | Age: 78
Discharge: HOME | End: 2023-11-22
Attending: SURGERY | Admitting: SURGERY
Payer: MEDICARE

## 2023-11-20 DIAGNOSIS — Z01.818 PREOP TESTING: ICD-10-CM

## 2023-11-20 DIAGNOSIS — K40.20 BILATERAL INGUINAL HERNIA WITHOUT OBSTRUCTION OR GANGRENE: ICD-10-CM

## 2023-11-20 DIAGNOSIS — K40.20 NON-RECURRENT BILATERAL INGUINAL HERNIA WITHOUT OBSTRUCTION OR GANGRENE: Primary | ICD-10-CM

## 2023-11-20 DIAGNOSIS — R07.9 CHEST PAIN: ICD-10-CM

## 2023-11-20 LAB
POCT GLUCOSE: 171 MG/DL (ref 70–110)
POCT GLUCOSE: 274 MG/DL (ref 70–110)

## 2023-11-20 PROCEDURE — D9220A PRA ANESTHESIA: ICD-10-PCS | Mod: CRNA,,, | Performed by: STUDENT IN AN ORGANIZED HEALTH CARE EDUCATION/TRAINING PROGRAM

## 2023-11-20 PROCEDURE — D9220A PRA ANESTHESIA: Mod: ANES,,, | Performed by: ANESTHESIOLOGY

## 2023-11-20 PROCEDURE — 25000003 PHARM REV CODE 250: Performed by: ANESTHESIOLOGY

## 2023-11-20 PROCEDURE — 25000003 PHARM REV CODE 250: Performed by: SURGERY

## 2023-11-20 PROCEDURE — 63600175 PHARM REV CODE 636 W HCPCS: Performed by: SURGERY

## 2023-11-20 PROCEDURE — 94799 UNLISTED PULMONARY SVC/PX: CPT | Mod: XB

## 2023-11-20 PROCEDURE — 71000039 HC RECOVERY, EACH ADD'L HOUR: Performed by: SURGERY

## 2023-11-20 PROCEDURE — C1781 MESH (IMPLANTABLE): HCPCS | Performed by: SURGERY

## 2023-11-20 PROCEDURE — C1729 CATH, DRAINAGE: HCPCS | Performed by: SURGERY

## 2023-11-20 PROCEDURE — 63600175 PHARM REV CODE 636 W HCPCS: Performed by: NURSE PRACTITIONER

## 2023-11-20 PROCEDURE — 25000003 PHARM REV CODE 250: Performed by: STUDENT IN AN ORGANIZED HEALTH CARE EDUCATION/TRAINING PROGRAM

## 2023-11-20 PROCEDURE — D9220A PRA ANESTHESIA: ICD-10-PCS | Mod: ANES,,, | Performed by: ANESTHESIOLOGY

## 2023-11-20 PROCEDURE — 27201423 OPTIME MED/SURG SUP & DEVICES STERILE SUPPLY: Performed by: SURGERY

## 2023-11-20 PROCEDURE — D9220A PRA ANESTHESIA: Mod: CRNA,,, | Performed by: STUDENT IN AN ORGANIZED HEALTH CARE EDUCATION/TRAINING PROGRAM

## 2023-11-20 PROCEDURE — 37000009 HC ANESTHESIA EA ADD 15 MINS: Performed by: SURGERY

## 2023-11-20 PROCEDURE — 37000008 HC ANESTHESIA 1ST 15 MINUTES: Performed by: SURGERY

## 2023-11-20 PROCEDURE — 99900035 HC TECH TIME PER 15 MIN (STAT)

## 2023-11-20 PROCEDURE — 63600175 PHARM REV CODE 636 W HCPCS: Performed by: STUDENT IN AN ORGANIZED HEALTH CARE EDUCATION/TRAINING PROGRAM

## 2023-11-20 PROCEDURE — 49650 PR LAP,INGUINAL HERNIA REPR,INITIAL: ICD-10-PCS | Mod: 50,,, | Performed by: SURGERY

## 2023-11-20 PROCEDURE — 49650 LAP ING HERNIA REPAIR INIT: CPT | Mod: 50,,, | Performed by: SURGERY

## 2023-11-20 PROCEDURE — 94761 N-INVAS EAR/PLS OXIMETRY MLT: CPT

## 2023-11-20 PROCEDURE — 36000711: Performed by: SURGERY

## 2023-11-20 PROCEDURE — 63600175 PHARM REV CODE 636 W HCPCS: Performed by: ANESTHESIOLOGY

## 2023-11-20 PROCEDURE — 71000033 HC RECOVERY, INTIAL HOUR: Performed by: SURGERY

## 2023-11-20 PROCEDURE — 36000710: Performed by: SURGERY

## 2023-11-20 DEVICE — MESH PROGRIP LAP 12X16CM RIGHT: Type: IMPLANTABLE DEVICE | Site: INGUINAL | Status: FUNCTIONAL

## 2023-11-20 DEVICE — MESH PROGRIP LAP 12X16CM LEFT: Type: IMPLANTABLE DEVICE | Site: INGUINAL | Status: FUNCTIONAL

## 2023-11-20 RX ORDER — HYDROCODONE BITARTRATE AND ACETAMINOPHEN 5; 325 MG/1; MG/1
1 TABLET ORAL EVERY 6 HOURS PRN
Status: DISCONTINUED | OUTPATIENT
Start: 2023-11-20 | End: 2023-11-21

## 2023-11-20 RX ORDER — ALLOPURINOL 300 MG/1
300 TABLET ORAL DAILY
Status: DISCONTINUED | OUTPATIENT
Start: 2023-11-21 | End: 2023-11-22 | Stop reason: HOSPADM

## 2023-11-20 RX ORDER — PROPOFOL 10 MG/ML
VIAL (ML) INTRAVENOUS
Status: DISCONTINUED | OUTPATIENT
Start: 2023-11-20 | End: 2023-11-20

## 2023-11-20 RX ORDER — MUPIROCIN 20 MG/G
1 OINTMENT TOPICAL 2 TIMES DAILY
Status: DISCONTINUED | OUTPATIENT
Start: 2023-11-20 | End: 2023-11-22 | Stop reason: HOSPADM

## 2023-11-20 RX ORDER — FENTANYL CITRATE 50 UG/ML
INJECTION, SOLUTION INTRAMUSCULAR; INTRAVENOUS
Status: DISCONTINUED | OUTPATIENT
Start: 2023-11-20 | End: 2023-11-20

## 2023-11-20 RX ORDER — SUCCINYLCHOLINE CHLORIDE 20 MG/ML
INJECTION INTRAMUSCULAR; INTRAVENOUS
Status: DISCONTINUED | OUTPATIENT
Start: 2023-11-20 | End: 2023-11-20

## 2023-11-20 RX ORDER — DEXAMETHASONE SODIUM PHOSPHATE 4 MG/ML
INJECTION, SOLUTION INTRA-ARTICULAR; INTRALESIONAL; INTRAMUSCULAR; INTRAVENOUS; SOFT TISSUE
Status: DISCONTINUED | OUTPATIENT
Start: 2023-11-20 | End: 2023-11-20

## 2023-11-20 RX ORDER — BUPIVACAINE HYDROCHLORIDE 2.5 MG/ML
INJECTION, SOLUTION EPIDURAL; INFILTRATION; INTRACAUDAL
Status: DISCONTINUED | OUTPATIENT
Start: 2023-11-20 | End: 2023-11-20 | Stop reason: HOSPADM

## 2023-11-20 RX ORDER — LIDOCAINE HYDROCHLORIDE 20 MG/ML
INJECTION INTRAVENOUS
Status: DISCONTINUED | OUTPATIENT
Start: 2023-11-20 | End: 2023-11-20

## 2023-11-20 RX ORDER — GABAPENTIN 300 MG/1
300 CAPSULE ORAL NIGHTLY
Status: DISCONTINUED | OUTPATIENT
Start: 2023-11-20 | End: 2023-11-22 | Stop reason: HOSPADM

## 2023-11-20 RX ORDER — HYDROCODONE BITARTRATE AND ACETAMINOPHEN 5; 325 MG/1; MG/1
1 TABLET ORAL EVERY 6 HOURS PRN
Qty: 20 TABLET | Refills: 0 | Status: SHIPPED | OUTPATIENT
Start: 2023-11-20

## 2023-11-20 RX ORDER — NEOSTIGMINE METHYLSULFATE 1 MG/ML
INJECTION, SOLUTION INTRAVENOUS
Status: DISCONTINUED | OUTPATIENT
Start: 2023-11-20 | End: 2023-11-20

## 2023-11-20 RX ORDER — INSULIN ASPART 100 [IU]/ML
0-10 INJECTION, SOLUTION INTRAVENOUS; SUBCUTANEOUS
Status: DISCONTINUED | OUTPATIENT
Start: 2023-11-20 | End: 2023-11-22 | Stop reason: HOSPADM

## 2023-11-20 RX ORDER — AMLODIPINE BESYLATE 5 MG/1
5 TABLET ORAL DAILY
Status: DISCONTINUED | OUTPATIENT
Start: 2023-11-21 | End: 2023-11-22 | Stop reason: HOSPADM

## 2023-11-20 RX ORDER — PANTOPRAZOLE SODIUM 40 MG/1
40 TABLET, DELAYED RELEASE ORAL DAILY
Status: DISCONTINUED | OUTPATIENT
Start: 2023-11-21 | End: 2023-11-22 | Stop reason: HOSPADM

## 2023-11-20 RX ORDER — GLUCAGON 1 MG
1 KIT INJECTION
Status: DISCONTINUED | OUTPATIENT
Start: 2023-11-20 | End: 2023-11-22 | Stop reason: HOSPADM

## 2023-11-20 RX ORDER — ONDANSETRON 2 MG/ML
INJECTION INTRAMUSCULAR; INTRAVENOUS
Status: DISCONTINUED | OUTPATIENT
Start: 2023-11-20 | End: 2023-11-20

## 2023-11-20 RX ORDER — OXYCODONE HYDROCHLORIDE 5 MG/1
5 TABLET ORAL ONCE AS NEEDED
Status: COMPLETED | OUTPATIENT
Start: 2023-11-20 | End: 2023-11-20

## 2023-11-20 RX ORDER — TAMSULOSIN HYDROCHLORIDE 0.4 MG/1
0.4 CAPSULE ORAL DAILY
Status: DISCONTINUED | OUTPATIENT
Start: 2023-11-20 | End: 2023-11-22 | Stop reason: HOSPADM

## 2023-11-20 RX ORDER — FENTANYL CITRATE 50 UG/ML
25 INJECTION, SOLUTION INTRAMUSCULAR; INTRAVENOUS EVERY 5 MIN PRN
Status: COMPLETED | OUTPATIENT
Start: 2023-11-20 | End: 2023-11-20

## 2023-11-20 RX ORDER — LIDOCAINE HYDROCHLORIDE 10 MG/ML
1 INJECTION, SOLUTION EPIDURAL; INFILTRATION; INTRACAUDAL; PERINEURAL ONCE
Status: DISCONTINUED | OUTPATIENT
Start: 2023-11-20 | End: 2023-11-20 | Stop reason: HOSPADM

## 2023-11-20 RX ORDER — ONDANSETRON 2 MG/ML
4 INJECTION INTRAMUSCULAR; INTRAVENOUS ONCE AS NEEDED
Status: COMPLETED | OUTPATIENT
Start: 2023-11-20 | End: 2023-11-20

## 2023-11-20 RX ORDER — EZETIMIBE 10 MG/1
10 TABLET ORAL NIGHTLY
Status: DISCONTINUED | OUTPATIENT
Start: 2023-11-20 | End: 2023-11-22 | Stop reason: HOSPADM

## 2023-11-20 RX ORDER — IBUPROFEN 200 MG
16 TABLET ORAL
Status: DISCONTINUED | OUTPATIENT
Start: 2023-11-20 | End: 2023-11-22 | Stop reason: HOSPADM

## 2023-11-20 RX ORDER — IBUPROFEN 200 MG
24 TABLET ORAL
Status: DISCONTINUED | OUTPATIENT
Start: 2023-11-20 | End: 2023-11-22 | Stop reason: HOSPADM

## 2023-11-20 RX ORDER — ROCURONIUM BROMIDE 10 MG/ML
INJECTION, SOLUTION INTRAVENOUS
Status: DISCONTINUED | OUTPATIENT
Start: 2023-11-20 | End: 2023-11-20

## 2023-11-20 RX ORDER — EPHEDRINE SULFATE 50 MG/ML
INJECTION, SOLUTION INTRAVENOUS
Status: DISCONTINUED | OUTPATIENT
Start: 2023-11-20 | End: 2023-11-20

## 2023-11-20 RX ORDER — CEFAZOLIN SODIUM 2 G/50ML
2 SOLUTION INTRAVENOUS
Status: COMPLETED | OUTPATIENT
Start: 2023-11-20 | End: 2023-11-20

## 2023-11-20 RX ADMIN — FENTANYL CITRATE 50 MCG: 50 INJECTION, SOLUTION INTRAMUSCULAR; INTRAVENOUS at 12:11

## 2023-11-20 RX ADMIN — FENTANYL CITRATE 25 MCG: 50 INJECTION INTRAMUSCULAR; INTRAVENOUS at 04:11

## 2023-11-20 RX ADMIN — ONDANSETRON 4 MG: 2 INJECTION INTRAMUSCULAR; INTRAVENOUS at 04:11

## 2023-11-20 RX ADMIN — LIDOCAINE HYDROCHLORIDE 100 MG: 20 INJECTION, SOLUTION INTRAVENOUS at 12:11

## 2023-11-20 RX ADMIN — ONDANSETRON 4 MG: 2 INJECTION INTRAMUSCULAR; INTRAVENOUS at 12:11

## 2023-11-20 RX ADMIN — FENTANYL CITRATE 25 MCG: 50 INJECTION, SOLUTION INTRAMUSCULAR; INTRAVENOUS at 03:11

## 2023-11-20 RX ADMIN — DEXAMETHASONE SODIUM PHOSPHATE 4 MG: 4 INJECTION, SOLUTION INTRA-ARTICULAR; INTRALESIONAL; INTRAMUSCULAR; INTRAVENOUS; SOFT TISSUE at 01:11

## 2023-11-20 RX ADMIN — CEFAZOLIN SODIUM 2 G: 2 SOLUTION INTRAVENOUS at 01:11

## 2023-11-20 RX ADMIN — PROPOFOL 30 MG: 10 INJECTION, EMULSION INTRAVENOUS at 03:11

## 2023-11-20 RX ADMIN — EPHEDRINE SULFATE 10 MG: 50 INJECTION, SOLUTION INTRAMUSCULAR; INTRAVENOUS; SUBCUTANEOUS at 01:11

## 2023-11-20 RX ADMIN — OXYCODONE HYDROCHLORIDE 5 MG: 5 TABLET ORAL at 04:11

## 2023-11-20 RX ADMIN — HYDROCODONE BITARTRATE AND ACETAMINOPHEN 1 TABLET: 5; 325 TABLET ORAL at 09:11

## 2023-11-20 RX ADMIN — GLYCOPYRROLATE 0.4 MG: 0.2 INJECTION, SOLUTION INTRAMUSCULAR; INTRAVITREAL at 03:11

## 2023-11-20 RX ADMIN — NEOSTIGMINE METHYLSULFATE 3 MG: 1 INJECTION INTRAVENOUS at 03:11

## 2023-11-20 RX ADMIN — GLYCOPYRROLATE 0.2 MG: 0.2 INJECTION, SOLUTION INTRAMUSCULAR; INTRAVITREAL at 01:11

## 2023-11-20 RX ADMIN — EZETIMIBE 10 MG: 10 TABLET ORAL at 09:11

## 2023-11-20 RX ADMIN — ROCURONIUM BROMIDE 20 MG: 10 INJECTION, SOLUTION INTRAVENOUS at 01:11

## 2023-11-20 RX ADMIN — FENTANYL CITRATE 25 MCG: 50 INJECTION, SOLUTION INTRAMUSCULAR; INTRAVENOUS at 02:11

## 2023-11-20 RX ADMIN — MUPIROCIN 1 G: 20 OINTMENT TOPICAL at 09:11

## 2023-11-20 RX ADMIN — SODIUM CHLORIDE, SODIUM GLUCONATE, SODIUM ACETATE, POTASSIUM CHLORIDE, MAGNESIUM CHLORIDE, SODIUM PHOSPHATE, DIBASIC, AND POTASSIUM PHOSPHATE: .53; .5; .37; .037; .03; .012; .00082 INJECTION, SOLUTION INTRAVENOUS at 02:11

## 2023-11-20 RX ADMIN — PROPOFOL 150 MG: 10 INJECTION, EMULSION INTRAVENOUS at 12:11

## 2023-11-20 RX ADMIN — SUCCINYLCHOLINE CHLORIDE 140 MG: 20 INJECTION, SOLUTION INTRAMUSCULAR; INTRAVENOUS at 12:11

## 2023-11-20 RX ADMIN — SODIUM CHLORIDE, SODIUM GLUCONATE, SODIUM ACETATE, POTASSIUM CHLORIDE, MAGNESIUM CHLORIDE, SODIUM PHOSPHATE, DIBASIC, AND POTASSIUM PHOSPHATE: .53; .5; .37; .037; .03; .012; .00082 INJECTION, SOLUTION INTRAVENOUS at 03:11

## 2023-11-20 RX ADMIN — SODIUM CHLORIDE, SODIUM GLUCONATE, SODIUM ACETATE, POTASSIUM CHLORIDE, MAGNESIUM CHLORIDE, SODIUM PHOSPHATE, DIBASIC, AND POTASSIUM PHOSPHATE: .53; .5; .37; .037; .03; .012; .00082 INJECTION, SOLUTION INTRAVENOUS at 11:11

## 2023-11-20 RX ADMIN — PROPOFOL 50 MG: 10 INJECTION, EMULSION INTRAVENOUS at 01:11

## 2023-11-20 RX ADMIN — GABAPENTIN 300 MG: 300 CAPSULE ORAL at 09:11

## 2023-11-20 RX ADMIN — PHENYLEPHRINE HYDROCHLORIDE 0.1 MCG/KG/MIN: 10 INJECTION INTRAVENOUS at 01:11

## 2023-11-20 RX ADMIN — INSULIN ASPART 3 UNITS: 100 INJECTION, SOLUTION INTRAVENOUS; SUBCUTANEOUS at 10:11

## 2023-11-20 RX ADMIN — TAMSULOSIN HYDROCHLORIDE 0.4 MG: 0.4 CAPSULE ORAL at 07:11

## 2023-11-20 RX ADMIN — ROCURONIUM BROMIDE 10 MG: 10 INJECTION, SOLUTION INTRAVENOUS at 12:11

## 2023-11-20 NOTE — OP NOTE
Surgery Date: 11/20/2023     Surgeon(s) and Role:     * Alphonse Huff III, MD - Primary    Assisting Surgeon: None    Pre-op Diagnosis:  Non-recurrent bilateral inguinal hernia without obstruction or gangrene [K40.20]    Post-op Diagnosis:  Post-Op Diagnosis Codes:     * Non-recurrent bilateral inguinal hernia without obstruction or gangrene [K40.20]  Intraabdominal adhesions - omentum to the anterior abdominal wall mesh.   Difficult Garcia catheter placement - 12 F placed.     Procedure(s) (LRB):  ROBOTIC REPAIR, HERNIA, INGUINAL (Bilateral)  Lysis of adhesions - omentum to the anterior abdominal wall mesh.     Anesthesia: General    Implants:  Implant Name Type Inv. Item Serial No.  Lot No. LRB No. Used Action   MESH PROGRIP LAP [id number] LEFT - [id number]  MESH PROGRIP LAP [id number] LEFT  COVIDIEN  Left 1 Implanted   MESH PROGRIP LAP [id number] RIGHT - [id number]  MESH PROGRIP LAP [id number] RIGHT  COVIDIEN  Right 1 Implanted       Operative Findings: Patient brought to the OR and transferred to the OR table in supine position. He was given general anesthesia and intubated.   18 F garcia attempted but would not pass into the bladder. 14 F attempted but would not pass. 12F did pass easily. We had blood tinged urine return.      Abdomen was prepped and draped.  I 1st entered the abdomen using a 5 mm Visiport.  A small incision was made in the LUQ. Veress needle placed through the abdominal wall. Abdomen insufflated. Visiport was visualized going through the layers of the abdominal wall into the abdominal cavity.  The abdominal cavity was insufflated. I could see dense omental adhesions to the anterior mid abdominal wall mesh. I placed a camera robotic 8 mm port just superior to the mesh edge. The right Arm was placed in the right lateral abdomen 10 cm lateral to the camera port. The 5 mm port was replaced with Instrument arm 2 was placed in the left lateral abdomen 10 cm lateral to the camera port. The  adhesions were taken down with the laparoscopic scissors. The robot was docked to our ports.  I then scrubbed out and went to the surgeon's console.  Patient had a large left inguinal hernia and a larger right inguinal hernia. The left was direct and the right was indirect.  I started the procedure with fenestrated bipolar forceps in my left arm and scissors hooked to monopolar cord in my right arm.  I turned my attention to the right inguinal hernia.  I created a peritoneal flap 8 cm superior to the hernia defect going medial to lateral.  The medial border of the flap was the medial umbilical ligament in the lateral border was about 8 cm superior to the anterior superior iliac spine.  The monopolar scissors was used to create the flap.  I then dissected the peritoneum off the underlying transversalis muscle. The is was done lateral to medial to the medial umbilical ligament.  I turned my attention to the medial aspect of my dissection.  The peritoneum was dissected off the rectus.  This was carried down inferiorly The pubic symphysis was located and visualized. Dejan's ligament was identified.  Dissection was carried several cm inferior to the pubic symphysis and then little past midline. That completed this aspect of the dissection.  I then turned my attention to the lateral aspect of my peritoneal flap.  The peritoneum was taken off the transversalis muscle inferiorly down to the plane of my medial flap dissection.  I then turned my attention to the hernia defect and cord structures at the center of the dissection. I dissected the peritoneum inferiorly toward the hernia sac. The sac was reduced out of the hernia defect and  from the cord structures. Dissection was carried inferiorly over the cord structures and the triangle of doom staying close to the peritoneum until I met the plane of dissection of the medial and lateral plane. There was good hemostasis.  The preperitoneal flap was completed at this  time.  I then turned my attention to the left inguinal hernia. I performed the same maneuvers to create the flap.     I created a peritoneal flap 8 cm superior to the hernia defect going medial to lateral.  The medial border of the flap was the medial umbilical ligament in the lateral border was about 8 cm superior to the anterior superior iliac spine.  The monopolar scissors was used to create the flap.  I then dissected the peritoneum off the underlying transversalis muscle. The is was done medial to lateral.  I turned my attention to the medial aspect of my dissection.  The peritoneum was dissected off the rectus.  This was carried down inferiorly The pubic symphysis was located and visualized. Dejan's ligament was identified.  Dissection was carried several cm inferior to the pubic symphysis and then little past midline. That completed this aspect of the dissection.  I then turned my attention to the lateral aspect of my peritoneal flap.  The peritoneum was taken off the transversalis muscle inferiorly down to the plane of my medial flap dissection.  I then turned my attention to the hernia defect and cord structures at the center of the dissection. I dissected the peritoneum inferiorly toward the hernia sac. The sac was reduced out of the hernia defect. Dissection was carried inferiorly over the cord structures and the triangle of doom staying close to the peritoneum until I met the plane of dissection of the medial and lateral plane. There was good hemostasis.  The preperitoneal flap was completed at this time.     Both Pro  mesh was placed into the abdomen. First the left and then the right were deployed over the myopectineal orifice covering the hernia defects.  The medial aspect of both mesh were at the pubic symphysis overlapping by about one or 2 cm.  The peritoneum was approximated with a running V lock to cover the mesh completely.  The needles were removed.   After this procedure was finished.   Instruments were removed.  The robot was undocked.  I scrubbed back in.  Skin sites were closed with 4-0 Monocryl.  Patient was extubated and brought to recovery room stable condition.  All instrument counts were correct.  All laparotomy pad counts were correct.        Estimated Blood Loss:  10mL  Estimated Blood Loss has been documented.   Complications none          Specimens:   Specimen (24h ago, onward)      None            [id number]

## 2023-11-20 NOTE — TRANSFER OF CARE
"Anesthesia Transfer of Care Note    Patient: Mauri Archer Jr.    Procedure(s) Performed: Procedure(s) (LRB):  ROBOTIC REPAIR, HERNIA, INGUINAL (Bilateral)    Patient location: PACU    Anesthesia Type: general    Transport from OR: Transported from OR on 2-3 L/min O2 by NC with adequate spontaneous ventilation    Post pain: adequate analgesia    Post assessment: no apparent anesthetic complications    Post vital signs: stable    Level of consciousness: awake    Nausea/Vomiting: no nausea/vomiting    Complications: none    Transfer of care protocol was followed      Last vitals: Visit Vitals  /71 (BP Location: Right arm, Patient Position: Lying)   Pulse 68   Temp 36.8 °C (98.2 °F) (Skin)   Resp 18   Ht 5' 10" (1.778 m)   Wt 90.7 kg (199 lb 15.3 oz)   SpO2 (!) 94%   BMI 28.69 kg/m²     "

## 2023-11-20 NOTE — ANESTHESIA PREPROCEDURE EVALUATION
11/20/2023  Mauri Archer Jr. is a 78 y.o., male.      Pre-op Assessment    I have reviewed the Patient Summary Reports.     I have reviewed the Nursing Notes. I have reviewed the NPO Status.   I have reviewed the Medications.     Review of Systems  Anesthesia Hx:  No problems with previous Anesthesia                Social:  Former Smoker       Hematology/Oncology:                        --  Cancer in past history:       Other (see Oncology comments)       surgery       Cardiovascular:     Hypertension           hyperlipidemia                             Pulmonary:  Pneumonia      Sleep Apnea                Renal/:  Chronic Renal Disease renal calculi               Hepatic/GI:     GERD             Endocrine:  Diabetes, type 2               Physical Exam  General: Well nourished, Cooperative, Alert and Oriented    Airway:  Mallampati: II   Mouth Opening: Normal  TM Distance: Normal  Tongue: Normal    Dental:  Intact    Chest/Lungs:  Normal Respiratory Rate    Heart:  Rate: Normal      Anesthesia Plan  Type of Anesthesia, risks & benefits discussed:    Anesthesia Type: Gen ETT  Intra-op Monitoring Plan: Standard ASA Monitors  Post Op Pain Control Plan: multimodal analgesia and IV/PO Opioids PRN  Induction:  IV  Airway Plan: Direct and Video, Post-Induction  ASA Score: 2  Day of Surgery Review of History & Physical: H&P Update referred to the surgeon/provider.    Ready For Surgery From Anesthesia Perspective.   .

## 2023-11-20 NOTE — ANESTHESIA POSTPROCEDURE EVALUATION
Anesthesia Post Evaluation    Patient: Mauri Archer Jr.    Procedure(s) Performed: Procedure(s) (LRB):  ROBOTIC REPAIR, HERNIA, INGUINAL (Bilateral)    Final Anesthesia Type: general      Patient location during evaluation: PACU  Patient participation: Yes- Able to Participate  Level of consciousness: awake and alert and oriented  Post-procedure vital signs: reviewed and stable  Pain management: adequate  Airway patency: patent  MELANIE mitigation strategies: Multimodal analgesia, Extubation while patient is awake and Verification of full reversal of neuromuscular block  PONV status at discharge: No PONV  Anesthetic complications: no      Cardiovascular status: blood pressure returned to baseline  Respiratory status: unassisted, spontaneous ventilation and room air  Hydration status: euvolemic  Follow-up not needed.          Vitals Value Taken Time   /60 11/20/23 1615   Temp 36.6 11/20/23 1619   Pulse 78 11/20/23 1618   Resp 19 11/20/23 1618   SpO2 100 % 11/20/23 1618   Vitals shown include unvalidated device data.      No case tracking events are documented in the log.      Pain/Daryn Score: No data recorded

## 2023-11-20 NOTE — PROGRESS NOTES
The patient had a traumatic Hurley catheter placement.  He feels to have a urethral stricture at the level of the prostate.  A 12 Slov[place] Hurley catheter could easily pass.  His urine is blood tinged.  I feel more comfortable keeping him overnight for observation in case he has a clotted Hurley that would require irrigation.  Plan is to leave Hurley in place.  Keep overnight.  If urine is clear will consider removing Hurley catheter tomorrow morning.  If urine continues to be bloody, may consult Urology in keep Hurley in place.

## 2023-11-20 NOTE — BRIEF OP NOTE
Critical access hospital - [facility]  Brief Operative Note    SUMMARY     Surgery Date: 11/20/2023     Surgeon(s) and Role:     * Alphonse Huff III, MD - Primary    Assisting Surgeon: None    Pre-op Diagnosis:  Non-recurrent bilateral inguinal hernia without obstruction or gangrene [K40.20]    Post-op Diagnosis:  Post-Op Diagnosis Codes:     * Non-recurrent bilateral inguinal hernia without obstruction or gangrene [K40.20]  Intraabdominal adhesions - omentum to the anterior abdominal wall mesh.   Difficult Garcia catheter placement - 12 F placed.     Procedure(s) (LRB):  ROBOTIC REPAIR, HERNIA, INGUINAL (Bilateral)  Lysis of adhesions - omentum to the anterior abdominal wall mesh.     Anesthesia: General    Implants:  Implant Name Type Inv. Item Serial No.  Lot No. LRB No. Used Action   MESH PROGRIP LAP [id number] LEFT - [id number]  MESH PROGRIP LAP [id number] LEFT  COVIDIEN  Left 1 Implanted   MESH PROGRIP LAP [id number] RIGHT - [id number]  MESH PROGRIP LAP [id number] RIGHT  COVIDIEN  Right 1 Implanted       Operative Findings: Patient brought to the OR and transferred to the OR table in supine position. He was given general anesthesia and intubated.   18 F garcia attempted but would not pass into the bladder. 14 F attempted but would not pass. 12F did pass easily. We had blood tinged urine return.      Abdomen was prepped and draped.  I 1st entered the abdomen using a 5 mm Visiport.  A small incision was made in the LUQ. Veress needle placed through the abdominal wall. Abdomen insufflated. Visiport was visualized going through the layers of the abdominal wall into the abdominal cavity.  The abdominal cavity was insufflated. I could see dense omental adhesions to the anterior mid abdominal wall mesh. I placed a camera robotic 8 mm port just superior to the mesh edge. The right Arm was placed in the right lateral abdomen 10 cm lateral to the camera port. The 5 mm port was replaced with Instrument arm 2 was  placed in the left lateral abdomen 10 cm lateral to the camera port. The adhesions were taken down with the laparoscopic scissors. The robot was docked to our ports.  I then scrubbed out and went to the surgeon's console.  Patient had a large left inguinal hernia and a larger right inguinal hernia. The left was direct and the right was indirect.  I started the procedure with fenestrated bipolar forceps in my left arm and scissors hooked to monopolar cord in my right arm.  I turned my attention to the right inguinal hernia.  I created a peritoneal flap 8 cm superior to the hernia defect going medial to lateral.  The medial border of the flap was the medial umbilical ligament in the lateral border was about 8 cm superior to the anterior superior iliac spine.  The monopolar scissors was used to create the flap.  I then dissected the peritoneum off the underlying transversalis muscle. The is was done lateral to medial to the medial umbilical ligament.  I turned my attention to the medial aspect of my dissection.  The peritoneum was dissected off the rectus.  This was carried down inferiorly The pubic symphysis was located and visualized. Dejan's ligament was identified.  Dissection was carried several cm inferior to the pubic symphysis and then little past midline. That completed this aspect of the dissection.  I then turned my attention to the lateral aspect of my peritoneal flap.  The peritoneum was taken off the transversalis muscle inferiorly down to the plane of my medial flap dissection.  I then turned my attention to the hernia defect and cord structures at the center of the dissection. I dissected the peritoneum inferiorly toward the hernia sac. The sac was reduced out of the hernia defect and  from the cord structures. Dissection was carried inferiorly over the cord structures and the triangle of doom staying close to the peritoneum until I met the plane of dissection of the medial and lateral plane.  There was good hemostasis.  The preperitoneal flap was completed at this time.  I then turned my attention to the left inguinal hernia. I performed the same maneuvers to create the flap.     I created a peritoneal flap 8 cm superior to the hernia defect going medial to lateral.  The medial border of the flap was the medial umbilical ligament in the lateral border was about 8 cm superior to the anterior superior iliac spine.  The monopolar scissors was used to create the flap.  I then dissected the peritoneum off the underlying transversalis muscle. The is was done medial to lateral.  I turned my attention to the medial aspect of my dissection.  The peritoneum was dissected off the rectus.  This was carried down inferiorly The pubic symphysis was located and visualized. Dejan's ligament was identified.  Dissection was carried several cm inferior to the pubic symphysis and then little past midline. That completed this aspect of the dissection.  I then turned my attention to the lateral aspect of my peritoneal flap.  The peritoneum was taken off the transversalis muscle inferiorly down to the plane of my medial flap dissection.  I then turned my attention to the hernia defect and cord structures at the center of the dissection. I dissected the peritoneum inferiorly toward the hernia sac. The sac was reduced out of the hernia defect. Dissection was carried inferiorly over the cord structures and the triangle of doom staying close to the peritoneum until I met the plane of dissection of the medial and lateral plane. There was good hemostasis.  The preperitoneal flap was completed at this time.     Both Pro  mesh was placed into the abdomen. First the left and then the right were deployed over the myopectineal orifice covering the hernia defects.  The medial aspect of both mesh were at the pubic symphysis overlapping by about one or 2 cm.  The peritoneum was approximated with a running V lock to cover the mesh  completely.  The needles were removed.   After this procedure was finished.  Instruments were removed.  The robot was undocked.  I scrubbed back in.  Skin sites were closed with 4-0 Monocryl.  Patient was extubated and brought to recovery room stable condition.  All instrument counts were correct.  All laparotomy pad counts were correct.        Estimated Blood Loss:  10mL  Estimated Blood Loss has been documented.   Complications none          Specimens:   Specimen (24h ago, onward)      None            [id number]

## 2023-11-21 PROBLEM — R07.9 CHEST PAIN: Status: ACTIVE | Noted: 2023-11-21

## 2023-11-21 LAB
ANION GAP SERPL CALC-SCNC: 10 MMOL/L (ref 8–16)
ANION GAP SERPL CALC-SCNC: 12 MMOL/L (ref 8–16)
BASOPHILS # BLD AUTO: 0.02 K/UL (ref 0–0.2)
BASOPHILS NFR BLD: 0.2 % (ref 0–1.9)
BUN SERPL-MCNC: 18 MG/DL (ref 8–23)
BUN SERPL-MCNC: 21 MG/DL (ref 8–23)
CALCIUM SERPL-MCNC: 8.4 MG/DL (ref 8.7–10.5)
CALCIUM SERPL-MCNC: 8.7 MG/DL (ref 8.7–10.5)
CHLORIDE SERPL-SCNC: 102 MMOL/L (ref 95–110)
CHLORIDE SERPL-SCNC: 105 MMOL/L (ref 95–110)
CO2 SERPL-SCNC: 21 MMOL/L (ref 23–29)
CO2 SERPL-SCNC: 22 MMOL/L (ref 23–29)
CREAT SERPL-MCNC: 1.4 MG/DL (ref 0.5–1.4)
CREAT SERPL-MCNC: 1.4 MG/DL (ref 0.5–1.4)
DIFFERENTIAL METHOD: ABNORMAL
EOSINOPHIL # BLD AUTO: 0 K/UL (ref 0–0.5)
EOSINOPHIL NFR BLD: 0.1 % (ref 0–8)
ERYTHROCYTE [DISTWIDTH] IN BLOOD BY AUTOMATED COUNT: 13.9 % (ref 11.5–14.5)
EST. GFR  (NO RACE VARIABLE): 52 ML/MIN/1.73 M^2
EST. GFR  (NO RACE VARIABLE): 52 ML/MIN/1.73 M^2
ESTIMATED AVG GLUCOSE: 146 MG/DL (ref 68–131)
GLUCOSE SERPL-MCNC: 227 MG/DL (ref 70–110)
GLUCOSE SERPL-MCNC: 229 MG/DL (ref 70–110)
HBA1C MFR BLD: 6.7 % (ref 4.5–6.2)
HCT VFR BLD AUTO: 40.9 % (ref 40–54)
HGB BLD-MCNC: 13.4 G/DL (ref 14–18)
IMM GRANULOCYTES # BLD AUTO: 0.05 K/UL (ref 0–0.04)
IMM GRANULOCYTES NFR BLD AUTO: 0.5 % (ref 0–0.5)
LYMPHOCYTES # BLD AUTO: 0.9 K/UL (ref 1–4.8)
LYMPHOCYTES NFR BLD: 8.3 % (ref 18–48)
MAGNESIUM SERPL-MCNC: 1.8 MG/DL (ref 1.6–2.6)
MCH RBC QN AUTO: 31.4 PG (ref 27–31)
MCHC RBC AUTO-ENTMCNC: 32.8 G/DL (ref 32–36)
MCV RBC AUTO: 96 FL (ref 82–98)
MONOCYTES # BLD AUTO: 1.3 K/UL (ref 0.3–1)
MONOCYTES NFR BLD: 13 % (ref 4–15)
NEUTROPHILS # BLD AUTO: 8.1 K/UL (ref 1.8–7.7)
NEUTROPHILS NFR BLD: 77.9 % (ref 38–73)
NRBC BLD-RTO: 0 /100 WBC
PLATELET # BLD AUTO: 185 K/UL (ref 150–450)
PMV BLD AUTO: 9.3 FL (ref 9.2–12.9)
POCT GLUCOSE: 196 MG/DL (ref 70–110)
POCT GLUCOSE: 242 MG/DL (ref 70–110)
POCT GLUCOSE: 257 MG/DL (ref 70–110)
POCT GLUCOSE: 318 MG/DL (ref 70–110)
POTASSIUM SERPL-SCNC: 4.6 MMOL/L (ref 3.5–5.1)
POTASSIUM SERPL-SCNC: 5.2 MMOL/L (ref 3.5–5.1)
RBC # BLD AUTO: 4.27 M/UL (ref 4.6–6.2)
SODIUM SERPL-SCNC: 135 MMOL/L (ref 136–145)
SODIUM SERPL-SCNC: 137 MMOL/L (ref 136–145)
TROPONIN I SERPL DL<=0.01 NG/ML-MCNC: 0.01 NG/ML (ref 0–0.03)
TROPONIN I SERPL DL<=0.01 NG/ML-MCNC: <0.006 NG/ML (ref 0–0.03)
WBC # BLD AUTO: 10.33 K/UL (ref 3.9–12.7)

## 2023-11-21 PROCEDURE — 94761 N-INVAS EAR/PLS OXIMETRY MLT: CPT

## 2023-11-21 PROCEDURE — 80048 BASIC METABOLIC PNL TOTAL CA: CPT | Performed by: NURSE PRACTITIONER

## 2023-11-21 PROCEDURE — 84484 ASSAY OF TROPONIN QUANT: CPT | Performed by: STUDENT IN AN ORGANIZED HEALTH CARE EDUCATION/TRAINING PROGRAM

## 2023-11-21 PROCEDURE — 25000003 PHARM REV CODE 250

## 2023-11-21 PROCEDURE — 83735 ASSAY OF MAGNESIUM: CPT | Performed by: STUDENT IN AN ORGANIZED HEALTH CARE EDUCATION/TRAINING PROGRAM

## 2023-11-21 PROCEDURE — 85025 COMPLETE CBC W/AUTO DIFF WBC: CPT | Performed by: NURSE PRACTITIONER

## 2023-11-21 PROCEDURE — 63600175 PHARM REV CODE 636 W HCPCS: Performed by: NURSE PRACTITIONER

## 2023-11-21 PROCEDURE — 93010 EKG 12-LEAD: ICD-10-PCS | Mod: ,,, | Performed by: INTERNAL MEDICINE

## 2023-11-21 PROCEDURE — 25000003 PHARM REV CODE 250: Performed by: SURGERY

## 2023-11-21 PROCEDURE — 36415 COLL VENOUS BLD VENIPUNCTURE: CPT

## 2023-11-21 PROCEDURE — 93010 ELECTROCARDIOGRAM REPORT: CPT | Mod: ,,, | Performed by: INTERNAL MEDICINE

## 2023-11-21 PROCEDURE — 99900035 HC TECH TIME PER 15 MIN (STAT)

## 2023-11-21 PROCEDURE — 94799 UNLISTED PULMONARY SVC/PX: CPT | Mod: XB

## 2023-11-21 PROCEDURE — 36415 COLL VENOUS BLD VENIPUNCTURE: CPT | Performed by: NURSE PRACTITIONER

## 2023-11-21 PROCEDURE — 25000003 PHARM REV CODE 250: Performed by: NURSE PRACTITIONER

## 2023-11-21 PROCEDURE — 83036 HEMOGLOBIN GLYCOSYLATED A1C: CPT | Performed by: NURSE PRACTITIONER

## 2023-11-21 PROCEDURE — 80048 BASIC METABOLIC PNL TOTAL CA: CPT | Mod: 91 | Performed by: STUDENT IN AN ORGANIZED HEALTH CARE EDUCATION/TRAINING PROGRAM

## 2023-11-21 PROCEDURE — 93005 ELECTROCARDIOGRAM TRACING: CPT

## 2023-11-21 PROCEDURE — 84484 ASSAY OF TROPONIN QUANT: CPT | Mod: 91

## 2023-11-21 PROCEDURE — 51798 US URINE CAPACITY MEASURE: CPT

## 2023-11-21 RX ORDER — HYDROCODONE BITARTRATE AND ACETAMINOPHEN 7.5; 325 MG/1; MG/1
1 TABLET ORAL EVERY 4 HOURS PRN
Status: DISCONTINUED | OUTPATIENT
Start: 2023-11-21 | End: 2023-11-22 | Stop reason: HOSPADM

## 2023-11-21 RX ORDER — MAG HYDROX/ALUMINUM HYD/SIMETH 200-200-20
30 SUSPENSION, ORAL (FINAL DOSE FORM) ORAL ONCE
Status: COMPLETED | OUTPATIENT
Start: 2023-11-21 | End: 2023-11-21

## 2023-11-21 RX ORDER — LIDOCAINE HYDROCHLORIDE 20 MG/ML
15 SOLUTION OROPHARYNGEAL ONCE
Status: COMPLETED | OUTPATIENT
Start: 2023-11-21 | End: 2023-11-21

## 2023-11-21 RX ORDER — NAPROXEN SODIUM 220 MG/1
81 TABLET, FILM COATED ORAL ONCE
Status: COMPLETED | OUTPATIENT
Start: 2023-11-21 | End: 2023-11-21

## 2023-11-21 RX ORDER — HYDROCODONE BITARTRATE AND ACETAMINOPHEN 5; 325 MG/1; MG/1
1 TABLET ORAL EVERY 4 HOURS PRN
Status: DISCONTINUED | OUTPATIENT
Start: 2023-11-21 | End: 2023-11-21

## 2023-11-21 RX ORDER — AMOXICILLIN 250 MG
1 CAPSULE ORAL 2 TIMES DAILY
Status: DISCONTINUED | OUTPATIENT
Start: 2023-11-21 | End: 2023-11-22 | Stop reason: HOSPADM

## 2023-11-21 RX ORDER — SODIUM CHLORIDE 9 MG/ML
INJECTION, SOLUTION INTRAVENOUS CONTINUOUS
Status: DISCONTINUED | OUTPATIENT
Start: 2023-11-21 | End: 2023-11-22 | Stop reason: HOSPADM

## 2023-11-21 RX ADMIN — SODIUM ZIRCONIUM CYCLOSILICATE 5 G: 5 POWDER, FOR SUSPENSION ORAL at 09:11

## 2023-11-21 RX ADMIN — HYDROCODONE BITARTRATE AND ACETAMINOPHEN 1 TABLET: 5; 325 TABLET ORAL at 03:11

## 2023-11-21 RX ADMIN — SODIUM CHLORIDE 500 ML: 9 INJECTION, SOLUTION INTRAVENOUS at 09:11

## 2023-11-21 RX ADMIN — DOCUSATE SODIUM AND SENNOSIDES 1 TABLET: 8.6; 5 TABLET, FILM COATED ORAL at 09:11

## 2023-11-21 RX ADMIN — ALUMINUM HYDROXIDE, MAGNESIUM HYDROXIDE, AND DIMETHICONE 30 ML: 200; 20; 200 SUSPENSION ORAL at 10:11

## 2023-11-21 RX ADMIN — GABAPENTIN 300 MG: 300 CAPSULE ORAL at 09:11

## 2023-11-21 RX ADMIN — HYDROCODONE BITARTRATE AND ACETAMINOPHEN 1 TABLET: 7.5; 325 TABLET ORAL at 09:11

## 2023-11-21 RX ADMIN — INSULIN ASPART 2 UNITS: 100 INJECTION, SOLUTION INTRAVENOUS; SUBCUTANEOUS at 08:11

## 2023-11-21 RX ADMIN — EZETIMIBE 10 MG: 10 TABLET ORAL at 09:11

## 2023-11-21 RX ADMIN — MUPIROCIN 1 G: 20 OINTMENT TOPICAL at 09:11

## 2023-11-21 RX ADMIN — HYDROCODONE BITARTRATE AND ACETAMINOPHEN 1 TABLET: 7.5; 325 TABLET ORAL at 11:11

## 2023-11-21 RX ADMIN — PANTOPRAZOLE SODIUM 40 MG: 40 TABLET, DELAYED RELEASE ORAL at 09:11

## 2023-11-21 RX ADMIN — TAMSULOSIN HYDROCHLORIDE 0.4 MG: 0.4 CAPSULE ORAL at 09:11

## 2023-11-21 RX ADMIN — SODIUM CHLORIDE: 9 INJECTION, SOLUTION INTRAVENOUS at 01:11

## 2023-11-21 RX ADMIN — MUPIROCIN 1 G: 20 OINTMENT TOPICAL at 08:11

## 2023-11-21 RX ADMIN — LIDOCAINE HYDROCHLORIDE 15 ML: 20 SOLUTION ORAL; TOPICAL at 10:11

## 2023-11-21 RX ADMIN — INSULIN ASPART 8 UNITS: 100 INJECTION, SOLUTION INTRAVENOUS; SUBCUTANEOUS at 04:11

## 2023-11-21 RX ADMIN — LEVOTHYROXINE SODIUM 175 MCG: 0.12 TABLET ORAL at 08:11

## 2023-11-21 RX ADMIN — ALLOPURINOL 300 MG: 300 TABLET ORAL at 09:11

## 2023-11-21 RX ADMIN — ASPIRIN 81 MG CHEWABLE TABLET 81 MG: 81 TABLET CHEWABLE at 10:11

## 2023-11-21 RX ADMIN — INSULIN ASPART 3 UNITS: 100 INJECTION, SOLUTION INTRAVENOUS; SUBCUTANEOUS at 09:11

## 2023-11-21 RX ADMIN — INSULIN ASPART 4 UNITS: 100 INJECTION, SOLUTION INTRAVENOUS; SUBCUTANEOUS at 11:11

## 2023-11-21 RX ADMIN — HYDROCODONE BITARTRATE AND ACETAMINOPHEN 1 TABLET: 5; 325 TABLET ORAL at 06:11

## 2023-11-21 RX ADMIN — HYDROCODONE BITARTRATE AND ACETAMINOPHEN 1 TABLET: 7.5; 325 TABLET ORAL at 04:11

## 2023-11-21 NOTE — PLAN OF CARE
Problem: Adult Inpatient Plan of Care  Goal: Plan of Care Review  Outcome: Ongoing, Progressing  Goal: Patient-Specific Goal (Individualized)  Outcome: Ongoing, Progressing  Goal: Optimal Comfort and Wellbeing  Outcome: Ongoing, Progressing     Problem: Diabetes Comorbidity  Goal: Blood Glucose Level Within Targeted Range  Outcome: Ongoing, Progressing     Problem: Infection  Goal: Absence of Infection Signs and Symptoms  Outcome: Ongoing, Progressing     Problem: Pain Acute  Goal: Acceptable Pain Control and Functional Ability  Outcome: Ongoing, Progressing     Problem: Fall Injury Risk  Goal: Absence of Fall and Fall-Related Injury  Outcome: Ongoing, Progressing     Problem: Surgical Site Infection  Goal: Absence of Infection Signs and Symptoms  Outcome: Ongoing, Progressing    Spoke with patient and patient's daughter often throughout the shift regarding cares and further and ongoing POC. Patient effectively reports abdominal discomfort this shift and kindly requests available PRN pain medication. Oral pain medication with comfort measures utilized this shift to control and maintain pain. Surgical sites to abdomen without signs of infection and patient remains afebrile. Glucose is monitored as ordered this shift and correlating treatment is maintained. Indwelling garcia catheter does remain in place this shift with red/pink urine noted, without clots. Urine is noted to clear throughout shift. Fall and safety precautions remain in place this shift with no fall to note at this time.

## 2023-11-21 NOTE — HPI
Mauri Archer JrErnestina is a 78-year-old male with past medical history significant for type 2 diabetes, hyperlipidemia, hypertension, and thyroid disease who underwent bilateral inguinal hernia repair earlier today with Dr. Huff.  Preoperatively he was evaluated by Dr. Huff for discomfort associated with bilateral inguinal hernias and the plan agreed upon was surgical intervention.  While in surgery today, the patient underwent a traumatic Hurley placement with subsequent hematuria.  He will be monitored closely overnight by the service of hospital Medicine.

## 2023-11-21 NOTE — CARE UPDATE
11/21/23 0931   Patient Assessment/Suction   Level of Consciousness (AVPU) alert   Respiratory Effort Normal;Unlabored   Expansion/Accessory Muscles/Retractions no use of accessory muscles   Rhythm/Pattern, Respiratory unlabored   PRE-TX-O2   Device (Oxygen Therapy) room air   SpO2 (!) 94 %  (right ear)   Pulse Oximetry Type Intermittent   $ Pulse Oximetry - Multiple Charge Pulse Oximetry - Multiple   Pulse 85   Resp 18   Positioning HOB elevated 90 degrees

## 2023-11-21 NOTE — PLAN OF CARE
11/20/23 1900   Patient Assessment/Suction   Level of Consciousness (AVPU) alert   Respiratory Effort Unlabored;Normal   Expansion/Accessory Muscles/Retractions no retractions;no use of accessory muscles   All Lung Fields Breath Sounds clear;Anterior:;Lateral:   Rhythm/Pattern, Respiratory depth regular;pattern regular;unlabored   Cough Frequency no cough   Skin Integrity   $ Wound Care Tech Time 15 min   Area Observed Bridge of nose   Skin Appearance without discoloration   PRE-TX-O2   Device (Oxygen Therapy) room air   SpO2 (!) 93 %   Pulse Oximetry Type Intermittent   $ Pulse Oximetry - Multiple Charge Pulse Oximetry - Multiple   Pulse 78   Resp 18   Incentive Spirometer   $ Incentive Spirometer Charges done with encouragement   Incentive Spirometer Predicted Level (mL) 2000   Administration (IS) instruction provided, follow-up   Number of Repetitions (IS) 10   Level Incentive Spirometer (mL) 1500   Patient Tolerance (IS) good   Preset CPAP/BiPAP Settings   Mode Of Delivery other (see comments)  (home cpap at bedside)

## 2023-11-21 NOTE — NURSING
Assisted patient to sitting position on side of bed BP 95/52 sats 94 % on room air Liya VEGA notified

## 2023-11-21 NOTE — NURSING
Bladder scan done 636 urine residual in bladder, voided 350 ml clear yellow urine, post void bladder scan residual 363 ml Liya Carr notified Urology consult placed   ,DirectAddress_Unknown

## 2023-11-21 NOTE — NURSING
Hurley catheter removed per Dr De Leon orders, 15 ml sterile water removed from Balloon Discarded 450 ml pink tinged urine tolerated well

## 2023-11-21 NOTE — PROGRESS NOTES
[facility] Medicine  Progress Note    Patient Name: Mauri Archer Jr.  MRN: [id number]  Patient Class: OP- Outpatient Recovery   Admission Date: 11/20/2023  Length of Stay: 0 days  Attending Physician: Carlos Martinez MD  Primary Care Provider: Akanksha Vitale FNP        Subjective:     Principal Problem:Non-recurrent bilateral inguinal hernia without obstruction or gangrene        HPI:  Mauri Acrher Jr. is a 78-year-old male with past medical history significant for type 2 diabetes, hyperlipidemia, hypertension, and thyroid disease who underwent bilateral inguinal hernia repair earlier today with Dr. Huff.  Preoperatively he was evaluated by Dr. Huff for discomfort associated with bilateral inguinal hernias and the plan agreed upon was surgical intervention.  While in surgery today, the patient underwent a traumatic Hurley placement with subsequent hematuria.  He will be monitored closely overnight by the service of hospital Medicine.    Overview/Hospital Course:  No notes on file    Interval History:  Patient seen and examined.  Overnight notes reviewed.  Patient reported left-sided chest pain this morning and stat EKG and troponins were ordered.  EKG showed normal sinus rhythm with RBBB.  Troponins were negative and are being trended.  Patient was noted to have hyperkalemia on a.m. labs and we will, and fluid bolus were ordered with improvement in potassium.  Chest pain subsided.  He also reports intermittent dizziness and patient was blood pressure was noted to be low.  Fluid bolus was ordered and blood pressure medications were held.  Patient had traumatic Hurley catheter placement yesterday.  Urine cleared throughout the night and Hurley catheter was removed.  Patient was able to void.     Review of Systems   Respiratory:  Negative for cough and shortness of breath.    Cardiovascular:  Positive for chest pain.   Gastrointestinal:  Positive for abdominal pain. Negative  for nausea and vomiting.   Neurological:  Positive for dizziness.     Objective:     Vital Signs (Most Recent):  Temp: 98.3 °F (36.8 °C) (11/21/23 1145)  Pulse: 82 (11/21/23 1145)  Resp: 16 (11/21/23 1145)  BP: (!) 121/57 (11/21/23 1145)  SpO2: (!) 90 % (11/21/23 1145) Vital Signs (24h Range):  Temp:  [97.5 °F (36.4 °C)-98.8 °F (37.1 °C)] 98.3 °F (36.8 °C)  Pulse:  [70-92] 82  Resp:  [13-20] 16  SpO2:  [90 %-100 %] 90 %  BP: (102-131)/(51-65) 121/57     Weight: 90.7 kg (199 lb 15.3 oz)  Body mass index is 28.69 kg/m².    Intake/Output Summary (Last 24 hours) at 11/21/2023 1406  Last data filed at 11/21/2023 1307  Gross per 24 hour   Intake 3919 ml   Output 2000 ml   Net 1919 ml         Physical Exam  Vitals and nursing note reviewed.   Constitutional:       General: He is not in acute distress.     Appearance: Normal appearance. He is obese.   HENT:      Head: Normocephalic and atraumatic.      Mouth/Throat:      Mouth: Mucous membranes are moist.      Pharynx: Oropharynx is clear.   Eyes:      Extraocular Movements: Extraocular movements intact.      Pupils: Pupils are equal, round, and reactive to light.   Cardiovascular:      Rate and Rhythm: Normal rate and regular rhythm.      Pulses: Normal pulses.      Heart sounds: Normal heart sounds.   Pulmonary:      Effort: Pulmonary effort is normal.      Breath sounds: Normal breath sounds.   Abdominal:      General: Abdomen is flat. Bowel sounds are normal.      Palpations: Abdomen is soft.      Tenderness: There is generalized abdominal tenderness. There is no guarding or rebound.      Comments: Laparoscopic site sealed with Dermabond.   Generalized postop abdominal tenderness.   Musculoskeletal:         General: Normal range of motion.      Cervical back: Normal range of motion and neck supple.   Skin:     General: Skin is warm.      Capillary Refill: Capillary refill takes less than 2 seconds.   Neurological:      General: No focal deficit present.      Mental  Status: He is alert and oriented to person, place, and time. Mental status is at baseline.   Psychiatric:         Mood and Affect: Mood normal.         Behavior: Behavior normal.             Significant Labs: All pertinent labs within the past 24 hours have been reviewed.  CBC:   Recent Labs   Lab 11/21/23  0437   WBC 10.33   HGB 13.4*   HCT 40.9        CMP:   Recent Labs   Lab 11/21/23  0435 11/21/23  0937    135*   K 5.2* 4.6    102   CO2 22* 21*   * 227*   BUN 18 21   CREATININE 1.4 1.4   CALCIUM 8.4* 8.7   ANIONGAP 10 12     Troponin:   Recent Labs   Lab 11/21/23  0937 11/21/23  1307   TROPONINI <0.006 <0.006       Significant Imaging: I have reviewed all pertinent imaging results/findings within the past 24 hours.    Assessment/Plan:      * Non-recurrent bilateral inguinal hernia without obstruction or gangrene  POD 1 s/p bilateral inguinal hernia repair with Dr. Huff  Continue to follow recommendations of surgeon.  Needs aggressive incentive spirometry.  Follow hemoglobin and hematocrit closely.  Pain control.  Early ambulation        Chest pain  Acute:  -on telemetry   -stat EKG obtained   -initial troponin negative   -trend troponin       DMII (diabetes mellitus, type 2)  Patient's FSGs are controlled on current medication regimen.  Last A1c reviewed-   Lab Results   Component Value Date    HGBA1C 6.7 (H) 11/21/2023     Most recent fingerstick glucose reviewed-   Recent Labs   Lab 11/20/23  2151 11/21/23  0722 11/21/23  1137   POCTGLUCOSE 274* 196* 242*       Current correctional scale  Medium  Maintain anti-hyperglycemic dose as follows-   Antihyperglycemics (From admission, onward)      Start     Stop Route Frequency Ordered    11/20/23 2034  insulin aspart U-100 pen 0-10 Units         -- SubQ Before meals & nightly PRN 11/20/23 1934          Hold Oral hypoglycemics while patient is in the hospital.    Hematuria  S/p traumatic garcia placement  Leave Garcia in place overnight.   If urine is clear in am consider removing Hurley catheter; if urine continues to be bloody, may consult Urology in keep Hurley in place.   Manual flushes overnight as needed to clear clots        VTE Risk Mitigation (From admission, onward)      None            Discharge Planning   MILAGRO: 11/22/2023     Code Status: Prior   Is the patient medically ready for discharge?:     Reason for patient still in hospital (select all that apply): Patient trending condition, Laboratory test, Treatment, Consult recommendations, PT / OT recommendations, and Pending disposition  Discharge Plan A: Home with family                  Liya Carr PA-C  Department of Hospital Medicine   [facility]/Surg

## 2023-11-21 NOTE — ASSESSMENT & PLAN NOTE
POD 1 s/p bilateral inguinal hernia repair with Dr. Huff  Continue to follow recommendations of surgeon.  Needs aggressive incentive spirometry.  Follow hemoglobin and hematocrit closely.  Pain control.  Early ambulation

## 2023-11-21 NOTE — ASSESSMENT & PLAN NOTE
Patient's FSGs are controlled on current medication regimen.  Last A1c reviewed-   Lab Results   Component Value Date    HGBA1C 6.7 (H) 11/21/2023     Most recent fingerstick glucose reviewed-   Recent Labs   Lab 11/20/23  2151 11/21/23  0722 11/21/23  1137   POCTGLUCOSE 274* 196* 242*       Current correctional scale  Medium  Maintain anti-hyperglycemic dose as follows-   Antihyperglycemics (From admission, onward)    Start     Stop Route Frequency Ordered    11/20/23 2034  insulin aspart U-100 pen 0-10 Units         -- SubQ Before meals & nightly PRN 11/20/23 1934        Hold Oral hypoglycemics while patient is in the hospital.

## 2023-11-21 NOTE — ASSESSMENT & PLAN NOTE
POD 0 s/p bilateral inguinal hernia repair with Dr. Huff  Continue to follow recommendations of surgeon.  Needs aggressive incentive spirometry.  Follow hemoglobin and hematocrit closely.  Pain control.  Early ambulation

## 2023-11-21 NOTE — PLAN OF CARE
[facility]/Surg  Initial Discharge Assessment       Primary Care Provider: Akanksha Vitale FNP    Admission Diagnosis: Non-recurrent bilateral inguinal hernia without obstruction or gangrene [K40.20]  Bilateral inguinal hernia without obstruction or gangrene [K40.20]    Admission Date: 11/20/2023  Expected Discharge Date: 11/21/2023    Met with pt and wife to complete discharge assessment, verified PCP, pharmacy and information on facesheet.  No HH, dialysis and has CPAP at home.  Wife will drive pt home.  Pt's wife stated pt's CPAP is a loaner from Dr. Lemon's office, would like someone to f/u status of CPAP replacement/new CPAP.    Transition of Care Barriers: None    Payor: [employer] [facility] MCARE / Plan: [id number] MEDICARE ADVANTAGE / Product Type: Medicare Advantage /     Extended Emergency Contact Information  Primary Emergency Contact: Ingrid Mcpherson  Mobile Phone: [phone]  Relation: Daughter  Preferred language: English   needed? No  Secondary Emergency Contact: Arline Archer  Address: [address] [place], [place] [address] [facility]  Mobile Phone: [phone]  Relation: Spouse  Preferred language: English   needed? No    Discharge Plan A: Home with family  Discharge Plan B: Home with family      [id number] - [place] [place] - [address] AT [facility]  [address]  [place] [address]  Phone: [phone] Fax: [phone]    [facility] [place] - [facility] AT Portal to Registered [facility] Sites  [facility]  [place] [address]  Phone: [phone] Fax: [phone]      Initial Assessment (most recent)       Adult Discharge Assessment - 11/21/23 1153          Discharge Assessment    Assessment Type Discharge Planning Assessment     Confirmed/corrected address, phone number and insurance Yes     Source of Information family;patient      Communicated MILAGRO with patient/caregiver No     People in Home spouse     Do you expect to return to your current living situation? Yes     Prior to hospitilization cognitive status: Alert/Oriented     Current cognitive status: Alert/Oriented     Equipment Currently Used at Home CPAP     Readmission within 30 days? No     Patient currently being followed by outpatient case management? No     Do you currently have service(s) that help you manage your care at home? No     Do you take prescription medications? Yes     Do you have any problems affording any of your prescribed medications? No     Who is going to help you get home at discharge? spouse     How do you get to doctors appointments? car, drives self     Are you on dialysis? No     Do you take coumadin? No     DME Needed Upon Discharge  none     Discharge Plan discussed with: Patient;Spouse/sig other     Transition of Care Barriers None     Discharge Plan A Home with family     Discharge Plan B Home with family

## 2023-11-21 NOTE — PLAN OF CARE
"Pt complaining of chest pain 8/10, "aching." GARY Boucher at bedside. EKG obtained. Pt placed on telemetry. Pts daughter, resource RN and pts primary nurse at bedside  "

## 2023-11-21 NOTE — ASSESSMENT & PLAN NOTE
S/p traumatic garcia placement  Leave Garcia in place overnight.  If urine is clear in am consider removing Garcia catheter; if urine continues to be bloody, may consult Urology in keep Garcia in place.   Manual flushes overnight as needed to clear clots

## 2023-11-21 NOTE — SUBJECTIVE & OBJECTIVE
Interval History:  Patient seen and examined.  Overnight notes reviewed.  Patient reported left-sided chest pain this morning and stat EKG and troponins were ordered.  EKG showed normal sinus rhythm with RBBB.  Troponins were negative and are being trended.  Patient was noted to have hyperkalemia on a.m. labs and we will, and fluid bolus were ordered with improvement in potassium.  Chest pain subsided.  He also reports intermittent dizziness and patient was blood pressure was noted to be low.  Fluid bolus was ordered and blood pressure medications were held.  Patient had traumatic Hurley catheter placement yesterday.  Urine cleared throughout the night and Hurley catheter was removed.  Patient was able to void.     Review of Systems   Respiratory:  Negative for cough and shortness of breath.    Cardiovascular:  Positive for chest pain.   Gastrointestinal:  Positive for abdominal pain. Negative for nausea and vomiting.   Neurological:  Positive for dizziness.     Objective:     Vital Signs (Most Recent):  Temp: 98.3 °F (36.8 °C) (11/21/23 1145)  Pulse: 82 (11/21/23 1145)  Resp: 16 (11/21/23 1145)  BP: (!) 121/57 (11/21/23 1145)  SpO2: (!) 90 % (11/21/23 1145) Vital Signs (24h Range):  Temp:  [97.5 °F (36.4 °C)-98.8 °F (37.1 °C)] 98.3 °F (36.8 °C)  Pulse:  [70-92] 82  Resp:  [13-20] 16  SpO2:  [90 %-100 %] 90 %  BP: (102-131)/(51-65) 121/57     Weight: 90.7 kg (199 lb 15.3 oz)  Body mass index is 28.69 kg/m².    Intake/Output Summary (Last 24 hours) at 11/21/2023 1406  Last data filed at 11/21/2023 1307  Gross per 24 hour   Intake 3919 ml   Output 2000 ml   Net 1919 ml         Physical Exam  Vitals and nursing note reviewed.   Constitutional:       General: He is not in acute distress.     Appearance: Normal appearance. He is obese.   HENT:      Head: Normocephalic and atraumatic.      Mouth/Throat:      Mouth: Mucous membranes are moist.      Pharynx: Oropharynx is clear.   Eyes:      Extraocular Movements: Extraocular  movements intact.      Pupils: Pupils are equal, round, and reactive to light.   Cardiovascular:      Rate and Rhythm: Normal rate and regular rhythm.      Pulses: Normal pulses.      Heart sounds: Normal heart sounds.   Pulmonary:      Effort: Pulmonary effort is normal.      Breath sounds: Normal breath sounds.   Abdominal:      General: Abdomen is flat. Bowel sounds are normal.      Palpations: Abdomen is soft.      Tenderness: There is generalized abdominal tenderness. There is no guarding or rebound.      Comments: Laparoscopic site sealed with Dermabond.   Generalized postop abdominal tenderness.   Musculoskeletal:         General: Normal range of motion.      Cervical back: Normal range of motion and neck supple.   Skin:     General: Skin is warm.      Capillary Refill: Capillary refill takes less than 2 seconds.   Neurological:      General: No focal deficit present.      Mental Status: He is alert and oriented to person, place, and time. Mental status is at baseline.   Psychiatric:         Mood and Affect: Mood normal.         Behavior: Behavior normal.             Significant Labs: All pertinent labs within the past 24 hours have been reviewed.  CBC:   Recent Labs   Lab 11/21/23  0437   WBC 10.33   HGB 13.4*   HCT 40.9        CMP:   Recent Labs   Lab 11/21/23  0435 11/21/23  0937    135*   K 5.2* 4.6    102   CO2 22* 21*   * 227*   BUN 18 21   CREATININE 1.4 1.4   CALCIUM 8.4* 8.7   ANIONGAP 10 12     Troponin:   Recent Labs   Lab 11/21/23  0937 11/21/23  1307   TROPONINI <0.006 <0.006       Significant Imaging: I have reviewed all pertinent imaging results/findings within the past 24 hours.

## 2023-11-21 NOTE — SUBJECTIVE & OBJECTIVE
Past Medical History:   Diagnosis Date    COVID-19     Diabetes mellitus     GERD (gastroesophageal reflux disease)     Gout     Hearing aid worn     Left ear    [place] (hard of hearing)     BILAT AIDS    Hyperlipidemia     Hypertension     Kidney stone     Melanoma     Sleep apnea     CPAP  1    Thyroid cancer     Thyroid disease     Ulcer     Wears glasses        Past Surgical History:   Procedure Laterality Date    CATARACT EXTRACTION      EAR MASTOIDECTOMY W/ COCHLEAR IMPLANT W/ LANDMARK Right 2022    ELBOW SURGERY  1973    Rt    HERNIA REPAIR      LASER OF PROSTATE W/ GREEN LIGHT PVP  2017    Dr[facility]    LITHOTRIPSY      TOTAL THYROIDECTOMY  2014    bilateral-[facility]       Review of patient's allergies indicates:   Allergen Reactions    Morphine        No current facility-administered medications on file prior to encounter.     Current Outpatient Medications on File Prior to Encounter   Medication Sig    allopurinol (ZYLOPRIM) 300 MG tablet Take 300 mg by mouth once daily.    amLODIPine (NORVASC) 5 MG tablet Take 5 mg by mouth once daily.    azelastine-fluticasone (DYMISTA) 137-50 mcg/spray Spry nassal spray 1 spray by Nasal route 2 (two) times daily.     coenzyme Q10 200 mg capsule Take by mouth.    SYNTHROID 175 mcg tablet Take 175 mcg by mouth every Tues, Thurs.     TOUJEO MAX U-300 SOLOSTAR 300 unit/mL (3 mL) insulin pen     DEXILANT 60 mg capsule Take 60 mg by mouth once daily.     FREESTYLE LITE STRIPS Strp     ibuprofen (ADVIL,MOTRIN) 800 MG tablet Take 800 mg by mouth every 6 (six) hours as needed.    ONETOUCH DELICA PLUS LANCET 30 gauge Misc USE TO MONITOR GLUCOSE TWICE DAILY (NEED MEDICARE B INFO)     Family History    None       Tobacco Use    Smoking status: Former     Current packs/day: 0.00     Average packs/day: 1 pack/day for 10.0 years (10.0 ttl pk-yrs)     Types: Cigarettes     Start date: 1970     Quit date: 1980     Years since quittin.2    Smokeless  tobacco: Former     Quit date: 9/4/1981   Substance and Sexual Activity    Alcohol use: Yes     Comment: 1 beer a month    Drug use: No    Sexual activity: Not on file     Review of Systems   Unable to perform ROS: Other   Sedated at time of assessment  Objective:     Vital Signs (Most Recent):  Temp: 97.7 °F (36.5 °C) (11/20/23 1913)  Pulse: 88 (11/20/23 1913)  Resp: 18 (11/20/23 1913)  BP: (!) 117/57 (11/20/23 1913)  SpO2: (!) 92 % (11/20/23 1913) Vital Signs (24h Range):  Temp:  [97.5 °F (36.4 °C)-98.4 °F (36.9 °C)] 97.7 °F (36.5 °C)  Pulse:  [66-88] 88  Resp:  [13-20] 18  SpO2:  [92 %-100 %] 92 %  BP: (102-138)/(51-71) 117/57     Weight: 90.7 kg (199 lb 15.3 oz)  Body mass index is 28.69 kg/m².     Physical Exam  Constitutional:       Appearance: He is well-developed.      Interventions: He is sedated. Nasal cannula in place.   HENT:      Head: Normocephalic and atraumatic.   Eyes:      Conjunctiva/sclera: Conjunctivae normal.      Pupils: Pupils are equal, round, and reactive to light.   Cardiovascular:      Rate and Rhythm: Normal rate and regular rhythm.      Heart sounds: Normal heart sounds.   Pulmonary:      Effort: Pulmonary effort is normal. No respiratory distress.      Breath sounds: Normal breath sounds.   Abdominal:      General: Bowel sounds are decreased. There is no distension.      Palpations: Abdomen is soft.   Genitourinary:     Comments: Hurley catheter in place draining pink urine at this time.  Dark red urine noted to bag.  Musculoskeletal:         General: Normal range of motion.      Cervical back: Normal range of motion and neck supple.      Right lower leg: No edema.      Left lower leg: No edema.   Skin:     General: Skin is warm and dry.      Comments: Lap sites with Dermabond   Neurological:      General: No focal deficit present.   Psychiatric:         Mood and Affect: Mood normal.         Behavior: Behavior normal.              CRANIAL NERVES     CN III, IV, VI   Pupils are equal,  round, and reactive to light.       Significant Labs: All pertinent labs within the past 24 hours have been reviewed.    Significant Imaging: I have reviewed all pertinent imaging results/findings within the past 24 hours.

## 2023-11-21 NOTE — H&P
[facility] Medicine  History & Physical    Patient Name: Mauri Archer Jr.  MRN: [id number]  Patient Class: OP- Outpatient Recovery  Admission Date: 11/20/2023  Attending Physician: Carlos Martinez MD   Primary Care Provider: Akanksha Vitale FNP         Patient information was obtained from patient, past medical records, and ER records.     Subjective:     Principal Problem:Non-recurrent bilateral inguinal hernia without obstruction or gangrene    Chief Complaint:   Chief Complaint   Patient presents with    Procedure        HPI: Mauri Archer Jr. is a 78-year-old male with past medical history significant for type 2 diabetes, hyperlipidemia, hypertension, and thyroid disease who underwent bilateral inguinal hernia repair earlier today with Dr. Huff.  Preoperatively he was evaluated by Dr. Huff for discomfort associated with bilateral inguinal hernias and the plan agreed upon was surgical intervention.  While in surgery today, the patient underwent a traumatic Hurley placement with subsequent hematuria.  He will be monitored closely overnight by the service of hospital Medicine.    Past Medical History:   Diagnosis Date    COVID-19     Diabetes mellitus     GERD (gastroesophageal reflux disease)     Gout     Hearing aid worn     Left ear    [place] hearing[place]     BILAT AIDS    Hyperlipidemia     Hypertension     Kidney stone     Melanoma     Sleep apnea     CPAP  1    Thyroid cancer     Thyroid disease     Ulcer     Wears glasses        Past Surgical History:   Procedure Laterality Date    CATARACT EXTRACTION      EAR MASTOIDECTOMY W/ COCHLEAR IMPLANT W/ LANDMARK Right 06/09/2022    ELBOW SURGERY  1973    Rt    HERNIA REPAIR  1999    LASER OF PROSTATE W/ GREEN LIGHT PVP  04/18/2017    [facility]    LITHOTRIPSY      TOTAL THYROIDECTOMY  08/18/2014    bilateral-[facility]       Review of patient's allergies indicates:   Allergen Reactions    Morphine        No current  facility-administered medications on file prior to encounter.     Current Outpatient Medications on File Prior to Encounter   Medication Sig    allopurinol (ZYLOPRIM) 300 MG tablet Take 300 mg by mouth once daily.    amLODIPine (NORVASC) 5 MG tablet Take 5 mg by mouth once daily.    azelastine-fluticasone (DYMISTA) 137-50 mcg/spray Spry nassal spray 1 spray by Nasal route 2 (two) times daily.     coenzyme Q10 200 mg capsule Take by mouth.    SYNTHROID 175 mcg tablet Take 175 mcg by mouth every Tues, Thurs.     TOUJEO MAX U-300 SOLOSTAR 300 unit/mL (3 mL) insulin pen     DEXILANT 60 mg capsule Take 60 mg by mouth once daily.     FREESTYLE LITE STRIPS Strp     ibuprofen (ADVIL,MOTRIN) 800 MG tablet Take 800 mg by mouth every 6 (six) hours as needed.    ONETOUCH DELICA PLUS LANCET 30 gauge Misc USE TO MONITOR GLUCOSE TWICE DAILY (NEED MEDICARE B INFO)     Family History    None       Tobacco Use    Smoking status: Former     Current packs/day: 0.00     Average packs/day: 1 pack/day for 10.0 years (10.0 ttl pk-yrs)     Types: Cigarettes     Start date: 1970     Quit date: 1980     Years since quittin.2    Smokeless tobacco: Former     Quit date: 1981   Substance and Sexual Activity    Alcohol use: Yes     Comment: 1 beer a month    Drug use: No    Sexual activity: Not on file     Review of Systems   Unable to perform ROS: Other   Sedated at time of assessment  Objective:     Vital Signs (Most Recent):  Temp: 97.7 °F (36.5 °C) (23)  Pulse: 88 (23)  Resp: 18 (23)  BP: (!) 117/57 (23)  SpO2: (!) 92 % (23) Vital Signs (24h Range):  Temp:  [97.5 °F (36.4 °C)-98.4 °F (36.9 °C)] 97.7 °F (36.5 °C)  Pulse:  [66-88] 88  Resp:  [13-20] 18  SpO2:  [92 %-100 %] 92 %  BP: (102-138)/(51-71) 117/57     Weight: 90.7 kg (199 lb 15.3 oz)  Body mass index is 28.69 kg/m².     Physical Exam  Constitutional:       Appearance: He is well-developed.      Interventions: He  "is sedated. Nasal cannula in place.   HENT:      Head: Normocephalic and atraumatic.   Eyes:      Conjunctiva/sclera: Conjunctivae normal.      Pupils: Pupils are equal, round, and reactive to light.   Cardiovascular:      Rate and Rhythm: Normal rate and regular rhythm.      Heart sounds: Normal heart sounds.   Pulmonary:      Effort: Pulmonary effort is normal. No respiratory distress.      Breath sounds: Normal breath sounds.   Abdominal:      General: Bowel sounds are decreased. There is no distension.      Palpations: Abdomen is soft.   Genitourinary:     Comments: Hurley catheter in place draining pink urine at this time.  Dark red urine noted to bag.  Musculoskeletal:         General: Normal range of motion.      Cervical back: Normal range of motion and neck supple.      Right lower leg: No edema.      Left lower leg: No edema.   Skin:     General: Skin is warm and dry.      Comments: Lap sites with Dermabond   Neurological:      General: No focal deficit present.   Psychiatric:         Mood and Affect: Mood normal.         Behavior: Behavior normal.              CRANIAL NERVES     CN III, IV, VI   Pupils are equal, round, and reactive to light.       Significant Labs: All pertinent labs within the past 24 hours have been reviewed.    Significant Imaging: I have reviewed all pertinent imaging results/findings within the past 24 hours.  Assessment/Plan:     * Non-recurrent bilateral inguinal hernia without obstruction or gangrene  POD 0 s/p bilateral inguinal hernia repair with Dr. Huff  Continue to follow recommendations of surgeon.  Needs aggressive incentive spirometry.  Follow hemoglobin and hematocrit closely.  Pain control.  Early ambulation        DMII (diabetes mellitus, type 2)  Patient's FSGs are controlled on current medication regimen.  Last A1c reviewed- No results found for: "LABA1C", "HGBA1C"  Most recent fingerstick glucose reviewed-   Recent Labs   Lab 11/20/23  1124   POCTGLUCOSE 171* "     Current correctional scale  Medium  Maintain anti-hyperglycemic dose as follows-   Antihyperglycemics (From admission, onward)      Start     Stop Route Frequency Ordered    11/20/23 2034  insulin aspart U-100 pen 0-10 Units         -- SubQ Before meals & nightly PRN 11/20/23 1934          Hold Oral hypoglycemics while patient is in the hospital.    Hematuria  S/p traumatic garcia placement  Leave Garcia in place overnight.  If urine is clear in am consider removing Garcia catheter; if urine continues to be bloody, may consult Urology in keep Garcia in place.   Manual flushes overnight as needed to clear clots        VTE Risk Mitigation (From admission, onward)      None                       The patient had a traumatic Garcia catheter placement.  He feels to have a urethral stricture at the level of the prostate.  A 12 French Garcia catheter could easily pass.  His urine is blood tinged.  I feel more comfortable keeping him overnight for observation in case he has a clotted Garcia that would require irrigation.  Plan is to leave Garcia in place.  Keep overnight.  If urine is clear will consider removing Garcia catheter tomorrow morning.  If urine continues to be bloody, may consult Urology in keep Garcia in place.    Tamia Pizano, ROSS  Department of Hospital Medicine  [facility]/Surg    N/A  Family history non-contributory to current problem

## 2023-11-21 NOTE — PT/OT/SLP PROGRESS
Physical Therapy      Patient Name:  Mauri Archer Jr.   MRN:  [id number]    Patient not seen today secondary to first attempt patient receiving a bolus. Second attempt patient declines participation secondary to fatigue with family reporting patient hypotensive when sitting up with RN earlier. Will follow-up 11/22/23.

## 2023-11-21 NOTE — NURSING
"Spoke with patient and daughter at bedside regarding noted hx of Diabetes. Patient reports normally checking blood glucose at home and does take regular scheduled Insulin as noted in home med rec. Patient reports taking "Mounjaro 20 units in the morning." Contacted ROSS Bill at 1933 regarding relevant patient information, history, and specifically spoke with provider regarding Diabetes and home management and that there are no orders currently for glucose monitoring. Orders noted to be entered by provider. Patient and daughter at bedside updated.   "

## 2023-11-21 NOTE — ASSESSMENT & PLAN NOTE
"Patient's FSGs are controlled on current medication regimen.  Last A1c reviewed- No results found for: "LABA1C", "HGBA1C"  Most recent fingerstick glucose reviewed-   Recent Labs   Lab 11/20/23  1124   POCTGLUCOSE 171*     Current correctional scale  Medium  Maintain anti-hyperglycemic dose as follows-   Antihyperglycemics (From admission, onward)      Start     Stop Route Frequency Ordered    11/20/23 2034  insulin aspart U-100 pen 0-10 Units         -- SubQ Before meals & nightly PRN 11/20/23 1934          Hold Oral hypoglycemics while patient is in the hospital.  "

## 2023-11-21 NOTE — NURSING
Contacted CHASTITY Edgar NP at [phone] regarding patient requesting PRN pain medication and request for stool softener to be ordered. Provider made aware of admitting diagnoses and surgical procedure that took place with relevant information voiced to NP. Notified of last dose Hydrocodone administered and when it is next due and that patient is requesting medication. After contacting NP, orders noted.

## 2023-11-21 NOTE — PROGRESS NOTES
[facility]/Surg  General Surgery  Progress Note    Subjective:     Interval History:  No acute events overnight.  Urine now clear.  Afebrile.  Abdominal and groin pain appropriate.    Post-Op Info:  Procedure(s) (LRB):  ROBOTIC REPAIR, HERNIA, INGUINAL (Bilateral)   1 Day Post-Op      Medications:  Continuous Infusions:   sodium chloride 0.9% 100 mL/hr at 11/21/23 1303     Scheduled Meds:   allopurinoL  300 mg Oral Daily    amLODIPine  5 mg Oral Daily    ezetimibe  10 mg Oral QHS    gabapentin  300 mg Oral QHS    levothyroxine  175 mcg Oral Every Tues, Thurs    mupirocin  1 g Nasal BID    pantoprazole  40 mg Oral Daily    senna-docusate 8.6-50 mg  1 tablet Oral BID    tamsulosin  0.4 mg Oral Daily     PRN Meds:dextrose 10%, dextrose 10%, glucagon (human recombinant), glucose, glucose, HYDROcodone-acetaminophen, insulin aspart U-100     Objective:     Vital Signs (Most Recent):  Temp: 98.9 °F (37.2 °C) (11/21/23 1543)  Pulse: 86 (11/21/23 1543)  Resp: 17 (11/21/23 1640)  BP: (!) 125/59 (11/21/23 1543)  SpO2: (!) 91 % (11/21/23 1543) Vital Signs (24h Range):  Temp:  [97.5 °F (36.4 °C)-98.9 °F (37.2 °C)] 98.9 °F (37.2 °C)  Pulse:  [78-92] 86  Resp:  [15-18] 17  SpO2:  [90 %-97 %] 91 %  BP: (102-131)/(51-65) 125/59       Intake/Output Summary (Last 24 hours) at 11/21/2023 1727  Last data filed at 11/21/2023 1719  Gross per 24 hour   Intake 860 ml   Output 3075 ml   Net -2215 ml       Physical Exam    Significant Labs:  CBC:   Recent Labs   Lab 11/21/23  0437   WBC 10.33   RBC 4.27*   HGB 13.4*   HCT 40.9      MCV 96   MCH 31.4*   MCHC 32.8     CMP:   Recent Labs   Lab 11/21/23  0937   *   CALCIUM 8.7   *   K 4.6   CO2 21*      BUN 21   CREATININE 1.4       Significant Diagnostics:  I have reviewed all pertinent imaging results/findings within the past 24 hours.    Assessment/Plan:     Active Diagnoses:    Diagnosis Date Noted POA    PRINCIPAL PROBLEM:  Non-recurrent bilateral  inguinal hernia without obstruction or gangrene [K40.20] 11/20/2023 Yes    Chest pain [R07.9] 11/21/2023 No    DMII (diabetes mellitus, type 2) [E11.9] 08/21/2021 Yes    Hematuria [R31.9] 01/23/2017 Yes      Problems Resolved During this Admission:     -Hurley catheter was removed.  I got report that he is able to urinate but feels that he is not emptying completely.  -also complained of some chest pain with some dizziness earlier today.  He will be kept inpatient for continued cardiac workup and probably further urologic workup.    Alphonse Huff III, MD  General Surgery  [facility]/Surg

## 2023-11-21 NOTE — NURSING
Voided 475 ml bladder scan performed 319 ml urine residual in bladder 2 clots passed Dr Wiggins and Liya VEGA notified

## 2023-11-21 NOTE — PLAN OF CARE
"Dr. Wiggins notified via secure chat of urinary retention after voiding. MD stated no need to replace garcia at this time and to "let him keep voiding on his own and bladder scan as needed." DAMIR Olivera, pts primary nurse notified.  "

## 2023-11-21 NOTE — PLAN OF CARE
Pt not medically clear at this time, new chest pain. Will follow.     11/21/23 0929   Discharge Reassessment   Assessment Type Discharge Planning Reassessment   Did the patient's condition or plan change since previous assessment? Yes   Discharge Plan discussed with: Patient;Adult children   Communicated MILAGRO with patient/caregiver Yes   Why the patient remains in the hospital Requires continued medical care

## 2023-11-22 ENCOUNTER — TELEPHONE (OUTPATIENT)
Dept: UROGYNECOLOGY | Facility: CLINIC | Age: 78
End: 2023-11-22
Payer: MEDICARE

## 2023-11-22 VITALS
HEART RATE: 87 BPM | SYSTOLIC BLOOD PRESSURE: 122 MMHG | WEIGHT: 199.94 LBS | TEMPERATURE: 99 F | DIASTOLIC BLOOD PRESSURE: 58 MMHG | HEIGHT: 70 IN | BODY MASS INDEX: 28.62 KG/M2 | RESPIRATION RATE: 17 BRPM | OXYGEN SATURATION: 91 %

## 2023-11-22 LAB
ANION GAP SERPL CALC-SCNC: 6 MMOL/L (ref 8–16)
BASOPHILS # BLD AUTO: 0.03 K/UL (ref 0–0.2)
BASOPHILS NFR BLD: 0.5 % (ref 0–1.9)
BUN SERPL-MCNC: 14 MG/DL (ref 8–23)
CALCIUM SERPL-MCNC: 8.6 MG/DL (ref 8.7–10.5)
CHLORIDE SERPL-SCNC: 106 MMOL/L (ref 95–110)
CO2 SERPL-SCNC: 25 MMOL/L (ref 23–29)
CREAT SERPL-MCNC: 1.1 MG/DL (ref 0.5–1.4)
DIFFERENTIAL METHOD: ABNORMAL
EOSINOPHIL # BLD AUTO: 0.1 K/UL (ref 0–0.5)
EOSINOPHIL NFR BLD: 1.6 % (ref 0–8)
ERYTHROCYTE [DISTWIDTH] IN BLOOD BY AUTOMATED COUNT: 13.6 % (ref 11.5–14.5)
EST. GFR  (NO RACE VARIABLE): >60 ML/MIN/1.73 M^2
GLUCOSE SERPL-MCNC: 235 MG/DL (ref 70–110)
HCT VFR BLD AUTO: 35.8 % (ref 40–54)
HGB BLD-MCNC: 11.9 G/DL (ref 14–18)
IMM GRANULOCYTES # BLD AUTO: 0.01 K/UL (ref 0–0.04)
IMM GRANULOCYTES NFR BLD AUTO: 0.2 % (ref 0–0.5)
LYMPHOCYTES # BLD AUTO: 0.8 K/UL (ref 1–4.8)
LYMPHOCYTES NFR BLD: 13.2 % (ref 18–48)
MCH RBC QN AUTO: 31.8 PG (ref 27–31)
MCHC RBC AUTO-ENTMCNC: 33.2 G/DL (ref 32–36)
MCV RBC AUTO: 96 FL (ref 82–98)
MONOCYTES # BLD AUTO: 0.9 K/UL (ref 0.3–1)
MONOCYTES NFR BLD: 14.8 % (ref 4–15)
NEUTROPHILS # BLD AUTO: 4.3 K/UL (ref 1.8–7.7)
NEUTROPHILS NFR BLD: 69.7 % (ref 38–73)
NRBC BLD-RTO: 0 /100 WBC
PLATELET # BLD AUTO: 123 K/UL (ref 150–450)
PMV BLD AUTO: 9.3 FL (ref 9.2–12.9)
POCT GLUCOSE: 275 MG/DL (ref 70–110)
POTASSIUM SERPL-SCNC: 4.6 MMOL/L (ref 3.5–5.1)
RBC # BLD AUTO: 3.74 M/UL (ref 4.6–6.2)
SODIUM SERPL-SCNC: 137 MMOL/L (ref 136–145)
WBC # BLD AUTO: 6.15 K/UL (ref 3.9–12.7)

## 2023-11-22 PROCEDURE — 85025 COMPLETE CBC W/AUTO DIFF WBC: CPT

## 2023-11-22 PROCEDURE — 94761 N-INVAS EAR/PLS OXIMETRY MLT: CPT

## 2023-11-22 PROCEDURE — 25000003 PHARM REV CODE 250: Performed by: SURGERY

## 2023-11-22 PROCEDURE — 99204 OFFICE O/P NEW MOD 45 MIN: CPT | Mod: ,,, | Performed by: STUDENT IN AN ORGANIZED HEALTH CARE EDUCATION/TRAINING PROGRAM

## 2023-11-22 PROCEDURE — 99900035 HC TECH TIME PER 15 MIN (STAT)

## 2023-11-22 PROCEDURE — 36415 COLL VENOUS BLD VENIPUNCTURE: CPT

## 2023-11-22 PROCEDURE — 99204 PR OFFICE/OUTPT VISIT, NEW, LEVL IV, 45-59 MIN: ICD-10-PCS | Mod: ,,, | Performed by: STUDENT IN AN ORGANIZED HEALTH CARE EDUCATION/TRAINING PROGRAM

## 2023-11-22 PROCEDURE — 25000003 PHARM REV CODE 250

## 2023-11-22 PROCEDURE — 25000003 PHARM REV CODE 250: Performed by: NURSE PRACTITIONER

## 2023-11-22 PROCEDURE — 80048 BASIC METABOLIC PNL TOTAL CA: CPT

## 2023-11-22 PROCEDURE — 51798 US URINE CAPACITY MEASURE: CPT

## 2023-11-22 PROCEDURE — 97161 PT EVAL LOW COMPLEX 20 MIN: CPT

## 2023-11-22 RX ORDER — INSULIN GLARGINE 300 [IU]/ML
20 INJECTION, SOLUTION SUBCUTANEOUS DAILY
Status: DISCONTINUED | OUTPATIENT
Start: 2023-11-22 | End: 2023-11-22 | Stop reason: HOSPADM

## 2023-11-22 RX ADMIN — HYDROCODONE BITARTRATE AND ACETAMINOPHEN 1 TABLET: 7.5; 325 TABLET ORAL at 10:11

## 2023-11-22 RX ADMIN — DOCUSATE SODIUM AND SENNOSIDES 1 TABLET: 8.6; 5 TABLET, FILM COATED ORAL at 08:11

## 2023-11-22 RX ADMIN — HYDROCODONE BITARTRATE AND ACETAMINOPHEN 1 TABLET: 7.5; 325 TABLET ORAL at 01:11

## 2023-11-22 RX ADMIN — PANTOPRAZOLE SODIUM 40 MG: 40 TABLET, DELAYED RELEASE ORAL at 08:11

## 2023-11-22 RX ADMIN — TAMSULOSIN HYDROCHLORIDE 0.4 MG: 0.4 CAPSULE ORAL at 08:11

## 2023-11-22 RX ADMIN — HYDROCODONE BITARTRATE AND ACETAMINOPHEN 1 TABLET: 7.5; 325 TABLET ORAL at 06:11

## 2023-11-22 RX ADMIN — SODIUM CHLORIDE: 9 INJECTION, SOLUTION INTRAVENOUS at 04:11

## 2023-11-22 RX ADMIN — INSULIN GLARGINE 20 UNITS: 300 INJECTION, SOLUTION SUBCUTANEOUS at 11:11

## 2023-11-22 RX ADMIN — ALLOPURINOL 300 MG: 300 TABLET ORAL at 08:11

## 2023-11-22 NOTE — PLAN OF CARE
Pt clear for DC from CM standpoint. Discharging home.      11/22/23 1032   Final Note   Assessment Type Final Discharge Note   Anticipated Discharge Disposition Home   Hospital Resources/Appts/Education Provided Appointments scheduled and added to AVS

## 2023-11-22 NOTE — PLAN OF CARE
Pt requested urology f/u to be with Dr. German. In basket message sent to his staff to contact pt to schedule follow up.

## 2023-11-22 NOTE — MEDICAL/APP STUDENT
[facility] Medicine  Discharge Summary     Patient Name: Mauri Archer Jr.  MRN: [id number]  Patient Class: OP- Outpatient Recovery        Admission Date: 11/20/2023  Length of Stay: 0 days  Attending Physician: Carlos Martinez MD  Primary Care Provider: Akanksha Vitale FNP           Subjective:      Principal Problem:Non-recurrent bilateral inguinal hernia without obstruction or gangrene           HPI:  Mauri Archer Jr. is a 78-year-old male with past medical history significant for type 2 diabetes, hyperlipidemia, hypertension, and thyroid disease who underwent bilateral inguinal hernia repair earlier today with Dr. Huff.  Preoperatively he was evaluated by Dr. Huff for discomfort associated with bilateral inguinal hernias and the plan agreed upon was surgical intervention.  While in surgery today, the patient underwent a traumatic Hurley placement with subsequent hematuria.  He will be monitored closely overnight by the service of hospital Medicine.     Hospital Course: Patient was admitted to the hospital s/p PELON inguinal hernia repair on 11/20/23 with Dr. Huff. The patient was monitored closely throughout the hospital stay. Patient underwent traumatic Hurley placement with hematuria, this resolved, patient able to void with clear urine. On POD #1 he reported left-sided chest pain and dizziness. Cardiac workup was negative with 4 serial troponins. EKG showed NSR with RBBB. Chest pain subsided. Dizziness resolved. Patient likely symptomatic due to decrease fluid intake and drop in BP, blood pressure medications held. He had an increase in his potassium (5.2) that improved following Lokelma. He continued to progress well post-op, working well with PT/OT. Patient was cleared by General surgery to discharge and f/u post-op. He will see Urology outpatient who is following, edematous scrotum on exam today. Patient was seen on day of discharge. Patient is very eager to go  home. Patient will f/u with PCP, General surgery and Cardiology for further outpatient workup. Patient verbalized understanding of all return precautions and discharge instructions.     Consults:    Final Active Diagnosis:  .Final diagnoses:  [K40.20] Bilateral inguinal hernia without obstruction or gangrene     Discharged Condition: good    Disposition: Home    Follow Up:   Nellie Wiggins MD   Specialty: Urology     Alphonse Huff MD   Specialty: General Surgery     Cardiology     Family Medicine    Significant Diagnostic Studies: Labs   CBC:   Recent Labs   Lab 11/21/23  0437 11/22/23  0804   WBC 10.33 6.15   HGB 13.4* 11.9*   HCT 40.9 35.8*    123*     CMP:   Recent Labs   Lab 11/21/23  0435 11/21/23  0937 11/22/23  0804    135* 137   K 5.2* 4.6 4.6    102 106   CO2 22* 21* 25   * 227* 235*   BUN 18 21 14   CREATININE 1.4 1.4 1.1   CALCIUM 8.4* 8.7 8.6*   ANIONGAP 10 12 6*     POCT Glucose:   Recent Labs   Lab 11/21/23  1137 11/21/23  1645 11/21/23  2102   POCTGLUCOSE 242* 318* 257*     Troponin:   Recent Labs   Lab 11/21/23  1307 11/21/23  1734 11/21/23  2137   TROPONINI <0.006 <0.006 0.008       Medications:  No current facility-administered medications on file prior to encounter.     Current Outpatient Medications on File Prior to Encounter   Medication Sig Dispense Refill    allopurinol (ZYLOPRIM) 300 MG tablet Take 300 mg by mouth once daily.      amLODIPine (NORVASC) 5 MG tablet Take 5 mg by mouth once daily.      azelastine-fluticasone (DYMISTA) 137-50 mcg/spray Spry nassal spray 1 spray by Nasal route 2 (two) times daily.       coenzyme Q10 200 mg capsule Take by mouth.      SYNTHROID 175 mcg tablet Take 175 mcg by mouth every Tues, Thurs.       TOUJEO MAX U-300 SOLOSTAR 300 unit/mL (3 mL) insulin pen       DEXILANT 60 mg capsule Take 60 mg by mouth once daily.       FREESTYLE LITE STRIPS Strp   6    ibuprofen (ADVIL,MOTRIN) 800 MG tablet Take 800 mg by mouth every 6  (six) hours as needed.      ONETOUCH DELICA PLUS LANCET 30 gauge Misc USE TO MONITOR GLUCOSE TWICE DAILY (NEED MEDICARE B INFO)

## 2023-11-22 NOTE — CONSULTS
[facility]  Urology  Consult Note    Patient Name: Mauri Archer Jr.  MRN: [id number]  Admission Date: 11/20/2023  Hospital Length of Stay: 0   Code Status: Prior   Attending Provider: Carlos Martinez MD   Consulting Provider: Nellie Wiggins MD  Primary Care Physician: Akanksha Vitale FNP  Principal Problem:Non-recurrent bilateral inguinal hernia without obstruction or gangrene    Inpatient consult to Urology  Consult performed by: Nellie Wiggins MD  Consult ordered by: Liya Carr PA-C  Reason for consult: traumatic garcia, hematuria          Subjective:     HPI:  Mauri Archer Jr. is a 78 y.o. male with hx of DM, GERD, HTN, HLD.     He underwent bilateral robotic inguinal hernia repair with Dr. Huff on 11/20. During surgery there was difficulty placing catheter and ultimately a 12 Fr was able to be placed with some hematuria noted.     Catheter was removed yesterday and patient's urine is now clear.   He is voiding on his own however with slightly elevated residuals in the 300s.     He is s/p laser TURP with Dr. Arevalo in 2017, has not been seen since 2019.     He is being kept in hospital for this and new complaints of chest pain.     He is voiding much better this am. Feels as though he is emptying appropriately.   Hematuria has resolved.   States that prior to surgery he had some frequency and hesitancy but overall no bothersome complaints. No hematuria.     Past Medical History:   Diagnosis Date    COVID-19     Diabetes mellitus     GERD (gastroesophageal reflux disease)     Gout     Hearing aid worn     Left ear    [place] (hard of hearing)     BILAT AIDS    Hyperlipidemia     Hypertension     Kidney stone     Melanoma     Sleep apnea     CPAP  1    Thyroid cancer     Thyroid disease     Ulcer     Wears glasses        Past Surgical History:   Procedure Laterality Date    CATARACT EXTRACTION      EAR MASTOIDECTOMY W/ COCHLEAR IMPLANT W/ LANDMARK Right 06/09/2022    ELBOW  SURGERY  1973    Rt    HERNIA REPAIR      LAS[facility]OSTATE W/ GREEN LIGHT [facility]    [facility]      ROBOT-ASSISTED LAPAROSCOPIC REPAIR OF INGUINAL HERNIA Bilateral 2023    Procedure: ROBOTIC REPAIR, HERNIA, INGUINAL;  Surgeon: Alphonse Huff III, MD;  [facility];  Service: General;  Laterality: Bilateral;    TOTAL THYROIDECTOMY  2014    [facility]       Review of patient's allergies indicates:   Allergen Reactions    Morphine        Family History    None         Tobacco Use    Smoking status: Former     Current packs/day: 0.00     Average packs/day: 1 pack/day for 10.0 years (10.0 ttl pk-yrs)     Types: Cigarettes     Start date: 1970     Quit date: 1980     Years since quittin.2    Smokeless tobacco: Former     Quit date: 1981   Substance and Sexual Activity    Alcohol use: Yes     Comment: 1 beer a month    Drug use: No    Sexual activity: Not on file       Review of Systems   Constitutional:  Negative for fever.   Gastrointestinal:  Negative for abdominal pain.   Genitourinary:  Positive for hematuria and scrotal swelling. Negative for difficulty urinating.   Neurological:  Negative for weakness.       Objective:     Temp:  [97.9 °F (36.6 °C)-98.9 °F (37.2 °C)] 98.2 °F (36.8 °C)  Pulse:  [76-86] 79  Resp:  [16-18] 18  SpO2:  [90 %-95 %] 95 %  BP: (104-137)/(56-64) 104/56  Weight: 90.7 kg (199 lb 15.3 oz)  Body mass index is 28.69 kg/m².    Date 23 0700 - 23 0659   Shift 1765-5809 8917-3622 7888-6186 24 Hour Total   INTAKE   P.O. 120   120   Shift Total(mL/kg) 120(1.3)   120(1.3)   OUTPUT   Urine(mL/kg/hr) 350   350   Emesis/NG output 0   0   Stool 0   0   Shift Total(mL/kg) 350(3.9)   350(3.9)   Weight (kg) 90.7 90.7 90.7 90.7     Bladder Scan Volume (mL): 137 mL (23 0540)    Drains       None                    Physical Exam  Constitutional:       General: He is not in acute distress.     Appearance: Normal  appearance. He is not ill-appearing.   HENT:      Head: Normocephalic and atraumatic.   Eyes:      General: No scleral icterus.  Cardiovascular:      Rate and Rhythm: Normal rate and regular rhythm.   Pulmonary:      Effort: Pulmonary effort is normal. No respiratory distress.   Abdominal:      General: There is no distension.   Genitourinary:     Comments: Severe swelling and bruising noted to the scrotum  Skin:     Coloration: Skin is not jaundiced.   Neurological:      General: No focal deficit present.      Mental Status: He is alert and oriented to person, place, and time.   Psychiatric:         Mood and Affect: Mood normal.         Behavior: Behavior normal.         Thought Content: Thought content normal.          Significant Labs:    BMP:  Recent Labs   Lab 11/21/23  0435 11/21/23  0937 11/22/23  0804    135* 137   K 5.2* 4.6 4.6    102 106   CO2 22* 21* 25   BUN 18 21 14   CREATININE 1.4 1.4 1.1   CALCIUM 8.4* 8.7 8.6*       CBC:  Recent Labs   Lab 11/21/23  0437 11/22/23  0804   WBC 10.33 6.15   HGB 13.4* 11.9*   HCT 40.9 35.8*    123*       All pertinent labs results from the past 24 hours have been reviewed.    Significant Imaging:  All pertinent imaging results/findings from the past 24 hours have been reviewed.      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 I will sign off. Please contact us if you have any additional questions.    Nellie BRADSHAW  MD Feliciano  Urology  [place] [facility]/Surg

## 2023-11-22 NOTE — PLAN OF CARE
Problem: Adult Inpatient Plan of Care  Goal: Plan of Care Review  Outcome: Ongoing, Progressing  Goal: Patient-Specific Goal (Individualized)  Outcome: Ongoing, Progressing  Goal: Optimal Comfort and Wellbeing  Outcome: Ongoing, Progressing     Problem: Diabetes Comorbidity  Goal: Blood Glucose Level Within Targeted Range  Outcome: Ongoing, Progressing     Problem: Infection  Goal: Absence of Infection Signs and Symptoms  Outcome: Ongoing, Progressing     Problem: Pain Acute  Goal: Acceptable Pain Control and Functional Ability  Outcome: Ongoing, Progressing     Problem: Fall Injury Risk  Goal: Absence of Fall and Fall-Related Injury  Outcome: Ongoing, Progressing     Problem: Surgical Site Infection  Goal: Absence of Infection Signs and Symptoms  Outcome: Ongoing, Progressing     Problem: Bowel Elimination/Continence Impairment  Goal: Effective Bowel Elimination/Continence  Outcome: Ongoing, Progressing

## 2023-11-22 NOTE — DISCHARGE INSTRUCTIONS
Discharge Instructions, [facility] Medicine    Thank you for choosing [facility] for your medical care. The primary doctor who is taking care of you at the time of your discharge is Carlos Martinez MD.     You were admitted to the hospital with Non-recurrent bilateral inguinal hernia without obstruction or gangrene.     Please note your discharge instructions, including diet/activity restrictions, follow-up appointments, and medication changes.  If you have any questions about your medical issues, prescriptions, or any other questions, please feel free to contact the [facility] Medicine Dept at [phone] and we will help.    If you are previously with Home health, outpatient PT/OT or under a therapy program, you are cleared to return to those programs.    Please direct all long term medication refills and follow up to your primary care provider, Akanksha Vitale FNP. Thank you again for letting us take care of your health care needs.    Please note the following discharge instructions per your discharging physician-  Liya Carr PA-C

## 2023-11-22 NOTE — PLAN OF CARE
11/21/23 1909   Patient Assessment/Suction   Level of Consciousness (AVPU) alert   Respiratory Effort Normal;Unlabored   Expansion/Accessory Muscles/Retractions no retractions;no use of accessory muscles   All Lung Fields Breath Sounds clear;Anterior:;Lateral:   Rhythm/Pattern, Respiratory depth regular;pattern regular;unlabored   Cough Frequency no cough   Skin Integrity   $ Wound Care Tech Time 15 min   Area Observed Bridge of nose   Skin Appearance without discoloration   PRE-TX-O2   Device (Oxygen Therapy) CPAP   SpO2 (!) 93 %   Pulse Oximetry Type Intermittent   $ Pulse Oximetry - Multiple Charge Pulse Oximetry - Multiple   Pulse 86   Resp 18   Incentive Spirometer   $ Incentive Spirometer Charges done with encouragement   Incentive Spirometer Predicted Level (mL) 2000   Administration (IS) instruction provided, follow-up   Number of Repetitions (IS) 10   Level Incentive Spirometer (mL) 1000   Patient Tolerance (IS) good   Preset CPAP/BiPAP Settings   Mode Of Delivery other (see comments)  (home cpap at bedside)

## 2023-11-22 NOTE — SUBJECTIVE & OBJECTIVE
Past Medical History:   Diagnosis Date    COVID-19     Diabetes mellitus     GERD (gastroesophageal reflux disease)     Gout     Hearing aid worn     Left ear    [place] (hard of hearing)     BILAT AIDS    Hyperlipidemia     Hypertension     Kidney stone     Melanoma     Sleep apnea     CPAP  1    Thyroid cancer     Thyroid disease     Ulcer     Wears glasses        Past Surgical History:   Procedure Laterality Date    CATARACT EXTRACTION      EAR MASTOIDECTOMY W/ COCHLEAR IMPLANT W/ LANDMARK Right 2022    ELBOW SURGERY  1973    Rt    HERNIA REPAIR      LASER OF PROSTATE W/ GREEN LIGHT PVP  [facility]    [facility]      ROBOT-ASSISTED LAPAROSCOPIC REPAIR OF INGUINAL HERNIA Bilateral 2023    Procedure: ROBOTIC REPAIR, HERNIA, INGUINAL;  Surgeon: Alphonse Huff III, MD;  Location: [facility];  Service: General;  Laterality: Bilateral;    TOTAL THYROIDECTOMY  2014    [facility]       Review of patient's allergies indicates:   Allergen Reactions    Morphine        Family History    None         Tobacco Use    Smoking status: Former     Current packs/day: 0.00     Average packs/day: 1 pack/day for 10.0 years (10.0 ttl pk-yrs)     Types: Cigarettes     Start date: 1970     Quit date: 1980     Years since quittin.2    Smokeless tobacco: Former     Quit date: 1981   Substance and Sexual Activity    Alcohol use: Yes     Comment: 1 beer a month    Drug use: No    Sexual activity: Not on file       Review of Systems   Constitutional:  Negative for fever.   Gastrointestinal:  Negative for abdominal pain.   Genitourinary:  Positive for hematuria and scrotal swelling. Negative for difficulty urinating.   Neurological:  Negative for weakness.       Objective:     Temp:  [97.9 °F (36.6 °C)-98.9 °F (37.2 °C)] 98.2 °F (36.8 °C)  Pulse:  [76-86] 79  Resp:  [16-18] 18  SpO2:  [90 %-95 %] 95 %  BP: (104-137)/(56-64) 104/56  Weight: 90.7 kg (199 lb 15.3 oz)  Body mass index is  28.69 kg/m².    Date 11/22/23 0700 - 11/23/23 0659   Shift 2708-3321 4440-4206 7870-8414 24 Hour Total   INTAKE   P.O. 120   120   Shift Total(mL/kg) 120(1.3)   120(1.3)   OUTPUT   Urine(mL/kg/hr) 350   350   Emesis/NG output 0   0   Stool 0   0   Shift Total(mL/kg) 350(3.9)   350(3.9)   Weight (kg) 90.7 90.7 90.7 90.7     Bladder Scan Volume (mL): 137 mL (11/22/23 0540)    Drains       None                    Physical Exam  Constitutional:       General: He is not in acute distress.     Appearance: Normal appearance. He is not ill-appearing.   HENT:      Head: Normocephalic and atraumatic.   Eyes:      General: No scleral icterus.  Cardiovascular:      Rate and Rhythm: Normal rate and regular rhythm.   Pulmonary:      Effort: Pulmonary effort is normal. No respiratory distress.   Abdominal:      General: There is no distension.   Genitourinary:     Comments: Severe swelling and bruising noted to the scrotum  Skin:     Coloration: Skin is not jaundiced.   Neurological:      General: No focal deficit present.      Mental Status: He is alert and oriented to person, place, and time.   Psychiatric:         Mood and Affect: Mood normal.         Behavior: Behavior normal.         Thought Content: Thought content normal.          Significant Labs:    BMP:  Recent Labs   Lab 11/21/23  0435 11/21/23  0937 11/22/23  0804    135* 137   K 5.2* 4.6 4.6    102 106   CO2 22* 21* 25   BUN 18 21 14   CREATININE 1.4 1.4 1.1   CALCIUM 8.4* 8.7 8.6*       CBC:  Recent Labs   Lab 11/21/23  0437 11/22/23  0804   WBC 10.33 6.15   HGB 13.4* 11.9*   HCT 40.9 35.8*    123*       All pertinent labs results from the past 24 hours have been reviewed.    Significant Imaging:  All pertinent imaging results/findings from the past 24 hours have been reviewed.

## 2023-11-22 NOTE — HOSPITAL COURSE
Patient was admitted the hospital after undergoing bilateral inguinal hernia repair with Dr. Huff on 11/20.  Patient was monitored closely throughout his hospital stay.  The patient had traumatic Hurley catheter insertion and developed hematuria and scrotal hematoma.  Scrotal support was ordered.  Patient was pain was well controlled with diet was advanced and tolerated.  Patient developed chest pain and stat EKG was ordered.  Troponins were ordered and trended which were all negative.  Patient's chest pain resolved.  Physical therapy was ordered.  The patient was eager for discharge.  Patient was cleared for discharge by General surgery and Urology.  Patient to follow up with General surgery, PCP, Urology on discharge.  Referral for cardiology was sent.  Return precautions discussed at length with the patient, patient's wife, and patient's daughter voiced understanding.  Encouraged scrotal support on discharge.  Encouraged monitoring daily blood pressure.  All questions answered.

## 2023-11-22 NOTE — ASSESSMENT & PLAN NOTE
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

## 2023-11-22 NOTE — HPI
Mauri Archer Jr. is a 78 y.o. male with hx of DM, GERD, HTN, HLD.     He underwent bilateral robotic inguinal hernia repair with Dr. Huff on 11/20. During surgery there was difficulty placing catheter and ultimately a 12 Fr was able to be placed with some hematuria noted.     Catheter was removed yesterday and patient's urine is now clear.   He is voiding on his own however with slightly elevated residuals in the 300s.     He is s/p laser TURP with Dr. Arevalo in 2017, has not been seen since 2019.     He is being kept in hospital for this and new complaints of chest pain.     He is voiding much better this am. Feels as though he is emptying appropriately.   Hematuria has resolved.   States that prior to surgery he had some frequency and hesitancy but overall no bothersome complaints. No hematuria.

## 2023-11-22 NOTE — DISCHARGE SUMMARY
[facility] Medicine  Discharge Summary      Patient Name: Mauri Archer Jr.  MRN: [id number]  [facility]: [id number]  Patient Class: OP- Outpatient Recovery  Admission Date: 11/20/2023  Hospital Length of Stay: 0 days  Discharge Date and Time:  11/22/2023 12:04 PM  Attending Physician: Carlos Martinez MD   Discharging Provider: Liya Carr PA-C  Primary Care Provider: Akanksha Vitale FNP    Primary Care Team: Networked reference to record PCT     HPI:   Mauri Archer Jr. is a 78-year-old male with past medical history significant for type 2 diabetes, hyperlipidemia, hypertension, and thyroid disease who underwent bilateral inguinal hernia repair earlier today with Dr. Huff.  Preoperatively he was evaluated by Dr. Huff for discomfort associated with bilateral inguinal hernias and the plan agreed upon was surgical intervention.  While in surgery today, the patient underwent a traumatic Hurley placement with subsequent hematuria.  He will be monitored closely overnight by the service of hospital Medicine.    Procedure(s) (LRB):  ROBOTIC REPAIR, HERNIA, INGUINAL (Bilateral)      Hospital Course:   Patient was admitted the hospital after undergoing bilateral inguinal hernia repair with Dr. Huff on 11/20.  Patient was monitored closely throughout his hospital stay.  The patient had traumatic Hurley catheter insertion and developed hematuria and scrotal hematoma.  Scrotal support was ordered.  Patient was pain was well controlled with diet was advanced and tolerated.  Patient developed chest pain and stat EKG was ordered.  Troponins were ordered and trended which were all negative.  Patient's chest pain resolved.  Physical therapy was ordered.  The patient was eager for discharge.  Patient was cleared for discharge by General surgery and Urology.  Patient to follow up with General surgery, PCP, Urology on discharge.  Referral for cardiology was sent.  Return precautions discussed at  length with the patient, patient's wife, and patient's daughter voiced understanding.  Encouraged scrotal support on discharge.  Encouraged monitoring daily blood pressure.  All questions answered.     Goals of Care Treatment Preferences:  Code Status: Full Code      Consults:   Consults (From admission, onward)          Status Ordering Provider     Inpatient consult to Urology  Once        Provider:  Nellie Wiggins MD    Completed JSAWINDER NAZARIO     Inpatient consult to Internal Medicine  Once        Provider:  Carlos Martinez MD    Acknowledged KAIDEN BURNS III            No new Assessment & Plan notes have been filed under this hospital service since the last note was generated.  Service: Hospital Medicine    Final Active Diagnoses:    Diagnosis Date Noted POA    PRINCIPAL PROBLEM:  Non-recurrent bilateral inguinal hernia without obstruction or gangrene [K40.20] 11/20/2023 Yes    Chest pain [R07.9] 11/21/2023 No    DMII (diabetes mellitus, type 2) [E11.9] 08/21/2021 Yes    Hematuria [R31.9] 01/23/2017 Yes      Problems Resolved During this Admission:       Discharged Condition: good    Disposition: Home or Self Care    Follow Up:   Follow-up Information       Kaiden Burns III, MD. Go on 12/5/2023.    Specialties: General Surgery, Surgery  Why: post op appt at 2:00  Contact information:  [address]  [address]  [facility] [address]  [phone]               [facility]. Schedule an appointment as soon as possible for a visit.    Specialty: Cardiology  Why: referral placed. the clinic should call you to schedule, if they do not call by next week, call them  Contact information:  [address]  [address]  [facility] [id number] [phone]  Additional information:  [address], [phone]             Nellie Wiggins MD. Schedule an appointment as soon as possible for a visit.    Specialty: Urology  Why: appt requested,  the clinic should call you to schedule, if they do not call by next week, call them  Contact information:  [address] [place] [address] [address]IVE  [address]  [facility] [address]  [phone]                           Patient Instructions:      Ambulatory referral/consult to Cardiology   Standing Status: Future   Referral Priority: Routine Referral Type: Consultation   Referral Reason: Specialty Services Required   Requested Specialty: Cardiology   Number of Visits Requested: 1     Diet Adult Regular     Lifting restrictions   Order Comments: No lifting over twenty pounds for six weeks     Remove dressing in 48 hours     Notify your health care provider if you experience any of the following:  temperature >100.4     Notify your health care provider if you experience any of the following:  persistent nausea and vomiting or diarrhea     Notify your health care provider if you experience any of the following:  severe uncontrolled pain     Notify your health care provider if you experience any of the following:  redness, tenderness, or signs of infection (pain, swelling, redness, odor or green/yellow discharge around incision site)     Notify your health care provider if you experience any of the following:  difficulty breathing or increased cough     Notify your health care provider if you experience any of the following:  increased confusion or weakness       Significant Diagnostic Studies: Labs: CMP   Recent Labs   Lab 11/21/23  0435 11/21/23  0937 11/22/23  0804    135* 137   K 5.2* 4.6 4.6    102 106   CO2 22* 21* 25   * 227* 235*   BUN 18 21 14   CREATININE 1.4 1.4 1.1   CALCIUM 8.4* 8.7 8.6*   ANIONGAP 10 12 6*    and CBC   Recent Labs   Lab 11/21/23  0437 11/22/23  0804   WBC 10.33 6.15   HGB 13.4* 11.9*   HCT 40.9 35.8*    123*       Pending Diagnostic Studies:       Procedure Component Value Units Date/Time    EKG 12-lead [[id number]]     Order Status: Sent Lab Status: No result             Medications:  Reconciled Home Medications:      Medication List        START taking these medications      HYDROcodone-acetaminophen 5-325 mg per tablet  Commonly known as: NORCO  Take 1 tablet by mouth every 6 (six) hours as needed for Pain.            CONTINUE taking these medications      allopurinoL 300 MG tablet  Commonly known as: ZYLOPRIM  Take 300 mg by mouth once daily.     amLODIPine 5 MG tablet  Commonly known as: NORVASC  Take 5 mg by mouth once daily.     azelastine-fluticasone 137-50 mcg/spray Spry nassal spray  Commonly known as: DYMISTA  1 spray by Nasal route 2 (two) times daily.     coenzyme Q10 200 mg capsule  Take by mouth.     DEXILANT 60 mg capsule  Generic drug: dexlansoprazole  Take 60 mg by mouth once daily.     ezetimibe 10 mg tablet  Commonly known as: ZETIA  Take 10 mg by mouth every evening.     FREESTYLE LITE STRIPS Strp  Generic drug: blood sugar diagnostic     gabapentin 300 MG capsule  Commonly known as: NEURONTIN  Take 300 mg by mouth every evening.     ibuprofen 800 MG tablet  Commonly known as: ADVIL,MOTRIN  Take 800 mg by mouth every 6 (six) hours as needed.     MOUNJARO 5 mg/0.5 mL Pnij  Generic drug: tirzepatide  Inject 5 mg into the skin every 7 days.     ONETOUCH DELICA PLUS LANCET 30 gauge Misc  Generic drug: lancets  USE TO MONITOR GLUCOSE TWICE DAILY (NEED MEDICARE B INFO)     SYNTHROID 175 MCG tablet  Generic drug: levothyroxine  Take 175 mcg by mouth every Tues, Thurs.     TOUJEO MAX U-300 SOLOSTAR 300 unit/mL (3 mL) insulin pen  Generic drug: insulin glargine U-300 conc              Indwelling Lines/Drains at time of discharge:   Lines/Drains/Airways       None                   Time spent on the discharge of patient: 35 minutes         Liya Carr PA-C  Department of Hospital Medicine  [facility]/Surg

## 2023-11-22 NOTE — TELEPHONE ENCOUNTER
----- Message from Nellie Wiggins MD sent at 11/22/2023 10:07 AM CST -----  Follow up with me next available, not urgent

## 2023-11-22 NOTE — PT/OT/SLP EVAL
Physical Therapy Evaluation and Discharge Note    Patient Name:  Mauri Archer Jr.   MRN:  [id number]    Recommendations:     Discharge Recommendations: No Therapy Indicated  Discharge Equipment Recommendations: none   Barriers to discharge: None    Assessment:     Mauri Archer Jr. is a 78 y.o. male admitted with a medical diagnosis of Non-recurrent bilateral inguinal hernia without obstruction or gangrene. .  At this time, patient is functioning at their prior level of function and does not require further acute PT services.     Recent Surgery: Procedure(s) (LRB):  ROBOTIC REPAIR, HERNIA, INGUINAL (Bilateral) 2 Days Post-Op    Plan:     During this hospitalization, patient does not require further acute PT services.  Please re-consult if situation changes.      Subjective     Chief Complaint: pain  Patient/Family Comments/goals: go home  Pain/Comfort:  Pain Rating 1: 5/10  Location - Side 1: Bilateral  Location - Orientation 1: anterior  Location 1: groin  Pain Addressed 1: Reposition, Cessation of Activity  Pain Rating Post-Intervention 1: 5/10    Patients cultural, spiritual, Holiness conflicts given the current situation:      Living Environment:  Currently lives with family in a 1 story home.  Prior to admission, patients level of function was independent.  Equipment used at home: none.  DME owned (not currently used): none.  Upon discharge, patient will have assistance from family.    Objective:     Communicated with nurse prior to session.  Patient found supine with hemovac upon PT entry to room.    General Precautions: Standard, fall    Orthopedic Precautions:N/A   Braces:    Respiratory Status: Room air    Exams:  RLE ROM: WFL  RLE Strength: WNL  LLE ROM: WFL  LLE Strength: WNL    Functional Mobility:  Bed Mobility:     Supine to Sit: independence  Transfers:     Sit to Stand:  independence with no AD  Gait: x 250 feet no AD supervision/independent    AM-PAC 6 CLICK MOBILITY  Total  Score:24       Treatment and Education:  None given    AM-PAC 6 CLICK MOBILITY  Total Score:24     Patient left sitting edge of bed with call button in reach, nurse notified, and family present.    GOALS:   Multidisciplinary Problems       Physical Therapy Goals       Not on file                    History:     Past Medical History:   Diagnosis Date    COVID-19     Diabetes mellitus     GERD (gastroesophageal reflux disease)     Gout     Hearing aid worn     Left ear    [place] (hard of hearing)     BILAT AIDS    Hyperlipidemia     Hypertension     Kidney stone     Melanoma     Sleep apnea     CPAP  1    Thyroid cancer     Thyroid disease     Ulcer     Wears glasses        Past Surgical History:   Procedure Laterality Date    CATARACT EXTRACTION      EAR MASTOIDECTOMY W/ COCHLEAR IMPLANT W/ LANDMARK Right 06/09/2022    ELBOW SURGERY  1973    Rt    HERNIA REPAIR  1999    LASER OF PROSTATE W/ GREEN LIGHT PVP  [facility]      ROBOT-ASSISTED LAPAROSCOPIC REPAIR OF INGUINAL HERNIA Bilateral 11/20/2023    Procedure: ROBOTIC REPAIR, HERNIA, INGUINAL;  Surgeon: Alphonse Huff III, MD;  Location: [facility];  Service: General;  Laterality: Bilateral;    TOTAL THYROIDECTOMY  08/18/2014    [facility]       Time Tracking:     PT Received On: 11/22/23  PT Start Time: 0920     PT Stop Time: 0934  PT Total Time (min): 14 min     Billable Minutes: Evaluation 14      11/22/2023

## 2023-11-29 RX ORDER — HYDROCODONE BITARTRATE AND ACETAMINOPHEN 5; 325 MG/1; MG/1
1 TABLET ORAL EVERY 6 HOURS PRN
Qty: 20 TABLET | Refills: 0 | Status: SHIPPED | OUTPATIENT
Start: 2023-11-29

## 2023-12-07 ENCOUNTER — OFFICE VISIT (OUTPATIENT)
Dept: SURGERY | Facility: CLINIC | Age: 78
End: 2023-12-07
Payer: MEDICARE

## 2023-12-07 VITALS
HEART RATE: 69 BPM | DIASTOLIC BLOOD PRESSURE: 75 MMHG | BODY MASS INDEX: 28.49 KG/M2 | WEIGHT: 199 LBS | TEMPERATURE: 98 F | HEIGHT: 70 IN | SYSTOLIC BLOOD PRESSURE: 126 MMHG

## 2023-12-07 DIAGNOSIS — N40.1 BPH WITH OBSTRUCTION/LOWER URINARY TRACT SYMPTOMS: ICD-10-CM

## 2023-12-07 DIAGNOSIS — N13.8 BPH WITH OBSTRUCTION/LOWER URINARY TRACT SYMPTOMS: ICD-10-CM

## 2023-12-07 DIAGNOSIS — K40.20 NON-RECURRENT BILATERAL INGUINAL HERNIA WITHOUT OBSTRUCTION OR GANGRENE: Primary | ICD-10-CM

## 2023-12-07 PROCEDURE — 1159F PR MEDICATION LIST DOCUMENTED IN MEDICAL RECORD: ICD-10-PCS | Mod: CPTII,S$GLB,, | Performed by: SURGERY

## 2023-12-07 PROCEDURE — 99024 PR POST-OP FOLLOW-UP VISIT: ICD-10-PCS | Mod: S$GLB,,, | Performed by: SURGERY

## 2023-12-07 PROCEDURE — 3074F PR MOST RECENT SYSTOLIC BLOOD PRESSURE < 130 MM HG: ICD-10-PCS | Mod: CPTII,S$GLB,, | Performed by: SURGERY

## 2023-12-07 PROCEDURE — 3078F DIAST BP <80 MM HG: CPT | Mod: CPTII,S$GLB,, | Performed by: SURGERY

## 2023-12-07 PROCEDURE — 99024 POSTOP FOLLOW-UP VISIT: CPT | Mod: S$GLB,,, | Performed by: SURGERY

## 2023-12-07 PROCEDURE — 1160F PR REVIEW ALL MEDS BY PRESCRIBER/CLIN PHARMACIST DOCUMENTED: ICD-10-PCS | Mod: CPTII,S$GLB,, | Performed by: SURGERY

## 2023-12-07 PROCEDURE — 3078F PR MOST RECENT DIASTOLIC BLOOD PRESSURE < 80 MM HG: ICD-10-PCS | Mod: CPTII,S$GLB,, | Performed by: SURGERY

## 2023-12-07 PROCEDURE — 1160F RVW MEDS BY RX/DR IN RCRD: CPT | Mod: CPTII,S$GLB,, | Performed by: SURGERY

## 2023-12-07 PROCEDURE — 1159F MED LIST DOCD IN RCRD: CPT | Mod: CPTII,S$GLB,, | Performed by: SURGERY

## 2023-12-07 PROCEDURE — 3074F SYST BP LT 130 MM HG: CPT | Mod: CPTII,S$GLB,, | Performed by: SURGERY

## 2023-12-12 NOTE — PROGRESS NOTES
Subjective:       Patient ID: Mauri Archer Jr. is a 78 y.o. male.    Chief Complaint: Post-op Evaluation      HPI:  S/p robotic bilateral inguinal hernia. Swelling and pain improving. He had a difficult garcia catheter placement. He reports having a weaker stream over the past year or so. He feels he has been urinating OK since discharge.     Past Medical History:   Diagnosis Date    COVID-19     Diabetes mellitus     GERD (gastroesophageal reflux disease)     Gout     Hearing aid worn     Left ear    [place] (hard of hearing)     BILAT AIDS    Hyperlipidemia     Hypertension     Kidney stone     Melanoma     Sleep apnea     CPAP  1    Thyroid cancer     Thyroid disease     Ulcer     Wears glasses      Past Surgical History:   Procedure Laterality Date    CATARACT EXTRACTION      EAR MASTOIDECTOMY W/ COCHLEAR IMPLANT W/ LANDMARK Right 06/09/2022    ELBOW SURGERY  1973    Rt    HERNIA REPAIR  1999    LASER OF [facility]OSTATE W/ GREEN LIGHT PVP  [facility]    LITHOTRIPSY      ROBOT-ASSISTED LAPAROSCOPIC REPAIR OF INGUINAL HERNIA Bilateral 11/20/2023    Procedure: ROBOTIC REPAIR, HERNIA, INGUINAL;  Surgeon: Alphonse Huff III, MD;  Location: [facility];  Service: General;  Laterality: Bilateral;    TOTAL THYROIDECTOMY  08/18/2014    bilateral-[facility]     Review of patient's allergies indicates:   Allergen Reactions    Morphine      Medication List with Changes/Refills   Current Medications    ALLOPURINOL (ZYLOPRIM) 300 MG TABLET    Take 300 mg by mouth once daily.    AMLODIPINE (NORVASC) 5 MG TABLET    Take 5 mg by mouth once daily.    AZELASTINE-FLUTICASONE (DYMISTA) 137-50 MCG/SPRAY SPRY NASSAL SPRAY    1 spray by Nasal route 2 (two) times daily.     COENZYME Q10 200 MG CAPSULE    Take by mouth.    DEXILANT 60 MG CAPSULE    Take 60 mg by mouth once daily.     EZETIMIBE (ZETIA) 10 MG TABLET    Take 10 mg by mouth every evening.    FREESTYLE LITE STRIPS STRP        GABAPENTIN (NEURONTIN) 300 MG CAPSULE    Take  300 mg by mouth every evening.    HYDROCODONE-ACETAMINOPHEN (NORCO) 5-325 MG PER TABLET    Take 1 tablet by mouth every 6 (six) hours as needed for Pain.    HYDROCODONE-ACETAMINOPHEN (NORCO) 5-325 MG PER TABLET    Take 1 tablet by mouth every 6 (six) hours as needed for Pain.    IBUPROFEN (ADVIL,MOTRIN) 800 MG TABLET    Take 800 mg by mouth every 6 (six) hours as needed.    ONETOUCH DELICA PLUS LANCET 30 GAUGE MISC    USE TO MONITOR GLUCOSE TWICE DAILY (NEED MEDICARE B INFO)    SYNTHROID 175 MCG TABLET    Take 175 mcg by mouth every Tues, Thurs.     TIRZEPATIDE (MOUNJARO) 5 MG/0.5 ML PNIJ    Inject 5 mg into the skin every 7 days.    TOUJEO MAX U-300 SOLOSTAR 300 UNIT/ML (3 ML) INSULIN PEN         History reviewed. No pertinent family history.  Social History     Socioeconomic History    Marital status:    Tobacco Use    Smoking status: Former     Current packs/day: 0.00     Average packs/day: 1 pack/day for 10.0 years (10.0 ttl pk-yrs)     Types: Cigarettes     Start date: 1970     Quit date: 1980     Years since quittin.2    Smokeless tobacco: Former     Quit date: 1981   Substance and Sexual Activity    Alcohol use: Yes     Comment: 1 beer a month    Drug use: No         Review of Systems    Objective:      Physical Exam  Constitutional:       General: He is not in acute distress.  Pulmonary:      Effort: Pulmonary effort is normal. No respiratory distress.   Abdominal:      General: There is no distension.      Palpations: Abdomen is soft.      Tenderness: There is no abdominal tenderness. There is no guarding or rebound.      Hernia: No hernia is present.      Comments: Mild scrotal swelling.      Skin:     Comments: Incisions are clean, dry and intact  There is no evidence of infection, hematoma or seroma    Neurological:      Mental Status: He is alert and oriented to person, place, and time.   Psychiatric:         Behavior: Behavior is cooperative.         Assessment/Plan:    Non-recurrent bilateral inguinal hernia without obstruction or gangrene    BPH with obstruction/lower urinary tract symptoms  -     Ambulatory referral/consult to Urology; Future; Expected date: 12/18/2023    S/p robotic bilateral inguinal hernia repair. Swelling improving. Doing well from hernia standpoint. He has a Difficult garcia placement for the surgery. We did get a 12 F in. He has been urinating OK. He is having a weak stream. History of prostate issues in the past. His regular urologist has retired. He would like to establish with another urologist which we also encouraged. Will place referral.

## 2024-01-16 ENCOUNTER — TELEPHONE (OUTPATIENT)
Dept: SURGERY | Facility: CLINIC | Age: 79
End: 2024-01-16
Payer: MEDICARE

## 2024-01-16 ENCOUNTER — OFFICE VISIT (OUTPATIENT)
Dept: SURGERY | Facility: CLINIC | Age: 79
End: 2024-01-16
Payer: MEDICARE

## 2024-01-16 VITALS — HEART RATE: 87 BPM | DIASTOLIC BLOOD PRESSURE: 98 MMHG | SYSTOLIC BLOOD PRESSURE: 163 MMHG | TEMPERATURE: 99 F

## 2024-01-16 DIAGNOSIS — K40.20 NON-RECURRENT BILATERAL INGUINAL HERNIA WITHOUT OBSTRUCTION OR GANGRENE: Primary | ICD-10-CM

## 2024-01-16 PROCEDURE — 1126F AMNT PAIN NOTED NONE PRSNT: CPT | Mod: CPTII,S$GLB,, | Performed by: SURGERY

## 2024-01-16 PROCEDURE — 3077F SYST BP >= 140 MM HG: CPT | Mod: CPTII,S$GLB,, | Performed by: SURGERY

## 2024-01-16 PROCEDURE — 99999 PR PBB SHADOW E&M-EST. PATIENT-LVL II: CPT | Mod: PBBFAC,,, | Performed by: SURGERY

## 2024-01-16 PROCEDURE — 1160F RVW MEDS BY RX/DR IN RCRD: CPT | Mod: CPTII,S$GLB,, | Performed by: SURGERY

## 2024-01-16 PROCEDURE — 3080F DIAST BP >= 90 MM HG: CPT | Mod: CPTII,S$GLB,, | Performed by: SURGERY

## 2024-01-16 PROCEDURE — 1159F MED LIST DOCD IN RCRD: CPT | Mod: CPTII,S$GLB,, | Performed by: SURGERY

## 2024-01-16 PROCEDURE — 99024 POSTOP FOLLOW-UP VISIT: CPT | Mod: S$GLB,,, | Performed by: SURGERY

## 2024-01-16 RX ORDER — LEVOTHYROXINE SODIUM 200 UG/1
TABLET ORAL
COMMUNITY

## 2024-01-16 RX ORDER — TIRZEPATIDE 7.5 MG/.5ML
INJECTION, SOLUTION SUBCUTANEOUS
COMMUNITY
Start: 2023-11-20

## 2024-01-16 RX ORDER — TAMSULOSIN HYDROCHLORIDE 0.4 MG/1
CAPSULE ORAL
COMMUNITY

## 2024-01-16 RX ORDER — PEN NEEDLE, DIABETIC 32 GX 1/4"
NEEDLE, DISPOSABLE MISCELLANEOUS
COMMUNITY
Start: 2024-01-15

## 2024-01-16 NOTE — TELEPHONE ENCOUNTER
----- Message from Indu Appiahon sent at 1/16/2024  3:52 PM CST -----  Contact: Pt Wife Arline  Type:  Sooner Apoointment Request  Name of Caller:Pt Wife Arline  When is the first available appointment? 1/26 (Dr. Chaz Barron)  Symptoms: Pt was trying to get an appt w/Dr. German   Would the patient rather a call back or a response via [facility]? call  Best Call Back Number: [phone]  Please call to advise... Thank you...

## 2024-01-16 NOTE — PROGRESS NOTES
Subjective:       Patient ID: Mauri Archer Jr. is a 78 y.o. male.    Chief Complaint: Hernia (H/o BIH repair, left side feels like it is not healed properly)      HPI:  S/p bilateral robotic inguinal henria repair 11/20/23. He had a a lot of bruising postoperatively.  The right side is now pain-free.  The left side still has mild-to-moderate discomfort worse with activity.  No fevers.  No sign of recurrent hernia with bulging.      Past Medical History:   Diagnosis Date    COVID-19     Diabetes mellitus     GERD (gastroesophageal reflux disease)     Gout     Hearing aid worn     Left ear    [place] (hard of hearing)     BILAT AIDS    Hyperlipidemia     Hypertension     Kidney stone     Melanoma     Sleep apnea     CPAP  1    Thyroid cancer     Thyroid disease     Ulcer     Wears glasses      Past Surgical History:   Procedure Laterality Date    CATARACT EXTRACTION      EAR MASTOIDECTOMY W/ COCHLEAR IMPLANT W/ LANDMARK Right 06/09/2022    ELBOW SURGERY  1973    Rt    HERNIA REPAIR  1999    LASER OF PROSTATE W/ GREEN LIGHT PVP  [facility]    LITH[facility]IPSY      ROBOT-ASSISTED LAPAROSCOPIC REPAIR OF INGUINAL HERNIA Bilateral 11/20/2023    Procedure: ROBOTIC REPAIR, HERNIA, INGUINAL;  Surgeon: Alphonse Huff III, MD;  Location: [facility];  Service: General;  Laterality: Bilateral;    TOTAL THYROIDECTOMY  08/18/2014    bilateral[facility]     Review of patient's allergies indicates:   Allergen Reactions    Morphine      Medication List with Changes/Refills   Current Medications    ALLOPURINOL (ZYLOPRIM) 300 MG TABLET    Take 300 mg by mouth once daily.    AMLODIPINE (NORVASC) 5 MG TABLET    Take 5 mg by mouth once daily.    AZELASTINE-FLUTICASONE (DYMISTA) 137-50 MCG/SPRAY SPRY NASSAL SPRAY    1 spray by Nasal route 2 (two) times daily.     COENZYME Q10 200 MG CAPSULE    Take by mouth.    DEXILANT 60 MG CAPSULE    Take 60 mg by mouth once daily.     EZETIMIBE (ZETIA) 10 MG TABLET    Take 10 mg by mouth every  "evening.    FREESTYLE LITE STRIPS STRP        GABAPENTIN (NEURONTIN) 300 MG CAPSULE    Take 300 mg by mouth every evening.    HYDROCODONE-ACETAMINOPHEN (NORCO) 5-325 MG PER TABLET    Take 1 tablet by mouth every 6 (six) hours as needed for Pain.    HYDROCODONE-ACETAMINOPHEN (NORCO) 5-325 MG PER TABLET    Take 1 tablet by mouth every 6 (six) hours as needed for Pain.    IBUPROFEN (ADVIL,MOTRIN) 800 MG TABLET    Take 800 mg by mouth every 6 (six) hours as needed.    MOUNJARO 7.5 MG/0.5 ML PNIJ    INJECT 7.5MG (CONTENTS OF 1 SYRINGE) INTO THE SKIN ONCE WEEKLY.    NOVOFINE 32 32 GAUGE X 1/4" NDLE    SMARTSI Pen Needle SUB-Q Daily    ONETOUCH DELICA PLUS LANCET 30 GAUGE MISC    USE TO MONITOR GLUCOSE TWICE DAILY (NEED MEDICARE B INFO)    SYNTHROID 175 MCG TABLET    Take 175 mcg by mouth every Tues, Thurs.     SYNTHROID 200 MCG TABLET    SMARTSI Tablet(s) By Mouth 4 Times a Week    TAMSULOSIN (FLOMAX) 0.4 MG CAP        TOUJEO MAX U-300 SOLOSTAR 300 UNIT/ML (3 ML) INSULIN PEN       Discontinued Medications    TIRZEPATIDE (MOUNJARO) 5 MG/0.5 ML PNIJ    Inject 5 mg into the skin every 7 days.     No family history on file.  Social History     Socioeconomic History    Marital status:    Tobacco Use    Smoking status: Former     Current packs/day: 0.00     Average packs/day: 1 pack/day for 10.0 years (10.0 ttl pk-yrs)     Types: Cigarettes     Start date: 1970     Quit date: 1980     Years since quittin.3    Smokeless tobacco: Former     Quit date: 1981   Substance and Sexual Activity    Alcohol use: Yes     Comment: 1 beer a month    Drug use: No         Review of Systems    Objective:      Physical Exam  Constitutional:       General: He is not in acute distress.  Pulmonary:      Effort: Pulmonary effort is normal. No respiratory distress.   Abdominal:      General: There is no distension.      Palpations: Abdomen is soft.      Tenderness: There is no abdominal tenderness. There is no guarding " or rebound.      Hernia: No hernia is present.      Comments: Mild scrotal swelling.      Skin:     Comments: Incisions are clean, dry and intact  There is no evidence of infection, hematoma or seroma    Neurological:      Mental Status: He is alert and oriented to person, place, and time.   Psychiatric:         Behavior: Behavior is cooperative.       Assessment/Plan:   Mauri was seen today for hernia.    Diagnoses and all orders for this visit:    Non-recurrent bilateral inguinal hernia without obstruction or gangrene status post repair with residual left groin discomfort compared to the right        No evidence of any complication.  No evidence of hernia on exam.  No significant hematoma or seroma.  Suspect postoperative discomfort that will slowly resolve over time.  Follow up with me p.r.jake.

## 2024-01-24 ENCOUNTER — TELEPHONE (OUTPATIENT)
Dept: FAMILY MEDICINE | Facility: CLINIC | Age: 79
End: 2024-01-24
Payer: MEDICARE

## 2024-01-24 NOTE — TELEPHONE ENCOUNTER
----- Message from Anne-Marie Lai, Patient Care Assistant sent at 1/24/2024  1:37 PM CST -----  Regarding: advice  Contact: pt  Type: Needs Medical Advice    Who Called:  pt     Best Call Back Number: [phone] (home)     Additional Information: pt states she would like a callback regarding documents for a procedure on 11/20/23. Please call to advise. Thanks!

## (undated) DEVICE — DRAPE ARM DAVINCI XI

## (undated) DEVICE — SET CYSTO IRRIGATION UNIV SPIK

## (undated) DEVICE — SUT MONOCRYL 4-0 PS-2

## (undated) DEVICE — SUT V-LOC 180 2-0 GS-22 9IN

## (undated) DEVICE — SUT CTD VICRYL VIL BR SH 27

## (undated) DEVICE — SOL WATER STRL IRR 1000ML

## (undated) DEVICE — COVER TIP CURVED SCISSORS XI

## (undated) DEVICE — SHEET DRAPE MEDIUM

## (undated) DEVICE — ADHESIVE DERMABOND ADVANCED

## (undated) DEVICE — NDL SAFETY 21G X 1 1/2 ECLPSE

## (undated) DEVICE — CATH FOLEY SIL 2-WAY 12FR 5CC

## (undated) DEVICE — SKINMARKER W/RULER DEVON

## (undated) DEVICE — SOL NS 1000CC

## (undated) DEVICE — Device

## (undated) DEVICE — DISSECTOR 5MM ENDOPATH

## (undated) DEVICE — NDL PNEUMO INSUFFLATI 120MM

## (undated) DEVICE — DRAPE THREE-QUARTER 53X77IN

## (undated) DEVICE — JELLY LUBRICANT STERILE 4 OZ

## (undated) DEVICE — TROCAR ENDO Z THREAD KII 5X100

## (undated) DEVICE — GLOVE SURG ULTRA TOUCH 7.5

## (undated) DEVICE — BLADE SURG CARBON STEEL SZ11

## (undated) DEVICE — TOWEL OR DISP STRL BLUE 4/PK

## (undated) DEVICE — GOWN POLY REINF X-LONG XL

## (undated) DEVICE — DRAPE ABDOMINAL TIBURON 14X11

## (undated) DEVICE — TOWEL OR NONABSORB ADH 17X26

## (undated) DEVICE — SCISSOR 5MMX35CM DIRECT DRIVE

## (undated) DEVICE — STERILE WATER 1000ML BOTTLE

## (undated) DEVICE — CATH XRAY LTX 2WAY 22 F 30CC

## (undated) DEVICE — CLIPPER BLADE MOD 4406 (CAREF)

## (undated) DEVICE — SOL POVIDONE PREP IODINE 4 OZ

## (undated) DEVICE — SYR ONLY LUER LOCK 20CC

## (undated) DEVICE — APPLICATOR CHLORAPREP ORN 26ML

## (undated) DEVICE — SLEEVE SCD EXPRESS KNEE MEDIUM

## (undated) DEVICE — GLOVE SENSICARE PI ALOE 7

## (undated) DEVICE — SEAL UNIVERSAL 5MM-8MM XI

## (undated) DEVICE — SOL CLEARIFY VISUALIZATION LAP

## (undated) DEVICE — OBTURATOR BLADELESS 8MM XI CLR

## (undated) DEVICE — SOL IRRI STRL WATER 1000ML

## (undated) DEVICE — GLOVE SENSICARE PI ALOE 6

## (undated) DEVICE — SEE MEDLINE ITEM 152651

## (undated) DEVICE — SEE MEDLINE ITEM 157185

## (undated) DEVICE — GLOVE SENSICARE PI GRN 7

## (undated) DEVICE — SEE MEDLINE ITEM 157116

## (undated) DEVICE — SOL IRR NACL .9% 3000ML

## (undated) DEVICE — SCRUB HIBICLENS 4% CHG 4OZ

## (undated) DEVICE — PACK CUSTOM UNIV BASIN SLI

## (undated) DEVICE — SEE L#120831

## (undated) DEVICE — SLEEVE SCD EXPRESS CALF MEDIUM

## (undated) DEVICE — ELECTRODE REM PLYHSV RETURN 9

## (undated) DEVICE — SET IV PRIMARY

## (undated) DEVICE — GOWN POLY REINF BRTH SLV LG

## (undated) DEVICE — WATER STERILE INJ 500ML BAG

## (undated) DEVICE — TRAY CATH FOL SIL URIMTR 16FR

## (undated) DEVICE — GOWN POLY REINF BRTH SLV XL

## (undated) DEVICE — SPONGE BULKEE II ABSRB 6X6.75

## (undated) DEVICE — SET TUBE PNEUMOCLEAR SE HI FLO

## (undated) DEVICE — FIBER LASER GREENLIGHT

## (undated) DEVICE — BAG URINARY LEG COMBO PACK STA

## (undated) DEVICE — COVER PROXIMA MAYO STAND

## (undated) DEVICE — SEE MEDLINE ITEM 152487

## (undated) DEVICE — SOL ELECTROLUBE ANTI-STIC

## (undated) DEVICE — DRAPE COLUMN DAVINCI XI

## (undated) DEVICE — GLOVE SENSICARE PI GRN 6.5

## (undated) DEVICE — STRAP OR TABLE 5IN X 72IN

## (undated) DEVICE — TUBING ARTHRO IRR 4-LEAD

## (undated) DEVICE — UNDERGLOVES BIOGEL PI SIZE 8

## (undated) DEVICE — GAUZE SPONGE BULKEE 6X6.75IN

## (undated) DEVICE — SEE MEDLINE ITEM 152622

## (undated) DEVICE — GLOVE SENSICARE PI GRN 8